# Patient Record
Sex: MALE | Race: WHITE | NOT HISPANIC OR LATINO | Employment: OTHER | URBAN - METROPOLITAN AREA
[De-identification: names, ages, dates, MRNs, and addresses within clinical notes are randomized per-mention and may not be internally consistent; named-entity substitution may affect disease eponyms.]

---

## 2019-04-16 PROBLEM — I42.8 NICM (NONISCHEMIC CARDIOMYOPATHY): Status: ACTIVE | Noted: 2019-04-16

## 2019-04-16 PROBLEM — I48.19 ATRIAL FIBRILLATION, PERSISTENT (HCC): Status: ACTIVE | Noted: 2019-04-16

## 2019-04-16 PROBLEM — I10 ESSENTIAL HYPERTENSION: Status: ACTIVE | Noted: 2019-04-16

## 2019-04-16 PROBLEM — E11.9 DIABETES MELLITUS TYPE II, CONTROLLED: Status: ACTIVE | Noted: 2019-04-16

## 2019-04-16 PROBLEM — I25.10 CORONARY ARTERY DISEASE INVOLVING NATIVE CORONARY ARTERY OF NATIVE HEART WITHOUT ANGINA PECTORIS: Status: ACTIVE | Noted: 2019-04-16

## 2019-04-16 NOTE — PROGRESS NOTES
Electrophysiology Clinic Consult     Tito Brennan  1960  [unfilled]  [unfilled]    04/17/19    DATE OF ADMISSION: (Not on file)  [facility] CARDIOLOGY    Gabe Mims MD  [address] / [place] [address]    Chief Complaint   Patient presents with   • Atrial Fibrillation       Problem List:  1. Persistent atrial fibrillation  a. CHADSVASc = 3, on warfarin  b. Diagnosed ~2000 at time of colonoscopy, asymptomatic  2. NICM  a. Echocardiogram 12/28/2018: EF 40%, LA size 5.2 cm in atrial fibrillation during exam  3. Coronary artery disease  a. Nuclear stress test 2/13/2019: Moderate size, moderate intensity anteroseptal wall reversible defect suggestive of ischemia, EF 37%  b. Left heart catheterization 3/6/2019: Nonobstructive, noncritical coronary artery disease to the distal RCA, 20-30%, EF 35-40%  4. Diabetes mellitus type II  5. Hypertension  6. Anxiety  7. Arthritis  8. Surgical history  a. Foot surgery  b. Hand surgery      History of Present Illness:  Patient is a 59-year-old male with the above-noted past medical history who presents today in consultation by referral from Dr. Ortiz for further evaluation and management of atrial fibrillation.  He was diagnosed with atrial fibrillation in approximately 2000 at the time of a colonoscopy.  He was completely asymptomatic at that time and believes he has been in atrial fibrillation since then.  He denies any hx of ECV or AAD use.  Echocardiogram in December demonstrated a reduced EF of approximately 40%.  Subsequently, he underwent stress test in February which was noted to be abnormal.  He then underwent LHC in March which only showed 20-30% distal RCA disease.  Given that he only had a mild, nonobstructive disease on his heart cath, it was felt that his cardiomyopathy was secondary to atrial fibrillation with RVR.  He has a CHADSVASc score of 3 and is anticoagulated with warfarin.  Symptoms with atrial fibrillation include rare  palpitations, described as mildly severe in nature and only occurs occasionally if he is resting, symptoms exacerbated by nothing, relieved by nothing.  He only feels his palpitations at most once weekly.  Does not routinely check HR at home.  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  HTN well controlled on meds.  No s/s suggestive of MO.  GERD well controlled on Protonix.  No known hx of thyroid issues.  He is a non-smoker, no ETOH, 2 decaf beverages daily.         No Known Allergies     Cannot display prior to admission medications because the patient has not been admitted in this contact.            Current Outpatient Medications:   •  diltiaZEM (CARDIZEM) 30 MG tablet, Take 30 mg by mouth 2 (Two) Times a Day., Disp: , Rfl:   •  enalapril (VASOTEC) 5 MG tablet, Take 5 mg by mouth 2 (Two) Times a Day., Disp: , Rfl:   •  metFORMIN (GLUCOPHAGE) 1000 MG tablet, Take 1,000 mg by mouth 2 (Two) Times a Day With Meals., Disp: , Rfl:   •  metoprolol succinate XL (TOPROL-XL) 100 MG 24 hr tablet, Take 100 mg by mouth Daily., Disp: , Rfl:   •  pantoprazole (PROTONIX) 40 MG EC tablet, Take 40 mg by mouth Daily., Disp: , Rfl:   •  warfarin (COUMADIN) 10 MG tablet, Take 10 mg by mouth Daily. As directed, Disp: , Rfl:     Social History     Socioeconomic History   • Marital status:      Spouse name: Not on file   • Number of children: Not on file   • Years of education: Not on file   • Highest education level: Not on file   Tobacco Use   • Smoking status: Never Smoker   Substance and Sexual Activity   • Alcohol use: No     Frequency: Never   • Drug use: No       Family History:  Father: , age 80, CVA  Mother: Alive, has pacemaker    REVIEW OF SYSTEMS:   CONST:  No weight loss, fever, chills, weakness + very mild fatigue.   HEENT:  No visual loss, blurred vision, double vision, yellow sclerae.                   No hearing loss, congestion, sore throat.   SKIN:      No rashes, urticaria, ulcers, sores.   "   RESP:     No shortness of breath, hemoptysis, +cough, + sputum.   GI:           No anorexia, nausea, vomiting, diarrhea. No abdominal pain, melena.   :         No burning on urination, hematuria or increased frequency.  ENDO:    No diaphoresis, cold or heat intolerance. No polyuria or polydipsia.   NEURO:  No headache, dizziness, syncope, paralysis, ataxia, or parasthesias.                  No change in bowel or bladder control. No history of CVA/TIA  MUSC:    + muscle pain (cramps), no back pain, joint pain or stiffness.   HEME:    No anemia, bleeding, bruising. No history of DVT/PE.  PSYCH:  No history of depression, anxiety    Vitals:    04/17/19 0934   BP: 120/88   BP Location: Left arm   Patient Position: Sitting   Pulse: 82   Weight: 120 kg (264 lb)   Height: 182.9 cm (72\")                 Physical Exam:  GEN: Well nourished, well-developed, no acute distress  HEENT: Normocephalic, atraumatic, PERRLA, moist mucous membranes  NECK: Supple, NO JVD, no thyromegaly, no lymphadenopathy  CARD: S1S2, irreg irreg, no murmur, gallop, rub, PMI displaced  LUNGS: Clear to auscultation, normal respiratory effort  ABDOMEN: Soft, nontender, normal bowel sounds  EXTREMITIES: No gross deformities, no clubbing, cyanosis, or edema  SKIN: Warm, dry, no lesions  NEURO: No focal deficits, alert and oriented x 3  PSYCHIATRIC: Normal affect and mood      I personally viewed and interpreted the patient's EKG/Telemetry/lab data    No results found for: GLUCOSE, CALCIUM, NA, K, CO2, CL, BUN, CREATININE, EGFRIFAFRI, EGFRIFNONA, BCR, ANIONGAP  No results found for: WBC, HGB, HCT, MCV, PLT  No results found for: INR, PROTIME  No results found for: TSH, B6JLRBG, Y9FKZOV, THYROIDAB    Labwork from OSH 4/9:  INR 1.66  Cr 0.98  BUN 14  K 4.8  WBC 10.38  H/H 14.6/44.0      ECG 12 Lead  Date/Time: 4/17/2019 9:41 AM  Performed by: Adam Vergara MD  Authorized by: Adam Vergara MD   Comparison: not compared with previous ECG "   Previous ECG: no previous ECG available  Rhythm: atrial fibrillation  BPM: 82                ICD-10-CM ICD-9-CM   1. Atrial fibrillation, persistent (CMS/HCC) I48.1 427.31   2. NICM (nonischemic cardiomyopathy) (CMS/HCC) I42.8 425.4   3. Essential hypertension I10 401.9       Assessment and Plan:   1. Persistent atrial fibrillation:  - Patient in persistent atrial fibrillation he believes since 2000.  No hx of ECV or AAD use.  Overall asymptomatic.  Now with reduced EF of 40% though no prior echo for comparison.  Likely tachycardia induced.  Given his duration of atrial fibrillation, unlikely that we would ever be able to restore normal rhythm.  Need to be aggressive in rate control.  Will place Zio patch to assess overall heart rates.  Would like to get off diltiazem soon  - CHADSVASc = 3, on warfarin  2. NICM: No family Hx of DCM. No CAD  - EF 40%. Normal coronaries per LHC.  Felt to be secondary to atrial fibrillation with RVR.  - On BBL, ACE.  Increase enalapril to 10 mg bid.  3. HTN:  - Well controlled      Scribed for Adam Vergara MD by Jade Linn, PAOLA. 4/17/2019  9:41 AM     I, Adam Vergara MD, personally performed the services described in this documentation as scribed by the above named individual in my presence, and it is both accurate and complete.  4/17/2019  10:10 AM

## 2019-04-17 ENCOUNTER — CONSULT (OUTPATIENT)
Dept: CARDIOLOGY | Facility: CLINIC | Age: 59
End: 2019-04-17

## 2019-04-17 VITALS
HEIGHT: 72 IN | WEIGHT: 264 LBS | DIASTOLIC BLOOD PRESSURE: 88 MMHG | SYSTOLIC BLOOD PRESSURE: 120 MMHG | HEART RATE: 82 BPM | BODY MASS INDEX: 35.76 KG/M2

## 2019-04-17 DIAGNOSIS — I42.8 NICM (NONISCHEMIC CARDIOMYOPATHY) (HCC): ICD-10-CM

## 2019-04-17 DIAGNOSIS — I10 ESSENTIAL HYPERTENSION: ICD-10-CM

## 2019-04-17 DIAGNOSIS — I48.19 ATRIAL FIBRILLATION, PERSISTENT (HCC): Primary | ICD-10-CM

## 2019-04-17 PROCEDURE — 99244 OFF/OP CNSLTJ NEW/EST MOD 40: CPT | Performed by: INTERNAL MEDICINE

## 2019-04-17 PROCEDURE — 93000 ELECTROCARDIOGRAM COMPLETE: CPT | Performed by: INTERNAL MEDICINE

## 2019-04-17 RX ORDER — METOPROLOL SUCCINATE 100 MG/1
100 TABLET, EXTENDED RELEASE ORAL DAILY
Status: ON HOLD | COMMUNITY
End: 2019-06-28 | Stop reason: SDUPTHER

## 2019-04-17 RX ORDER — ENALAPRIL MALEATE 5 MG/1
10 TABLET ORAL 2 TIMES DAILY
Qty: 180 TABLET | Refills: 3 | Status: SHIPPED | OUTPATIENT
Start: 2019-04-17 | End: 2019-06-28 | Stop reason: HOSPADM

## 2019-04-17 RX ORDER — ENALAPRIL MALEATE 5 MG/1
10 TABLET ORAL 2 TIMES DAILY
COMMUNITY
End: 2019-04-17 | Stop reason: SDUPTHER

## 2019-04-17 RX ORDER — PANTOPRAZOLE SODIUM 40 MG/1
40 TABLET, DELAYED RELEASE ORAL DAILY
COMMUNITY
End: 2020-06-03 | Stop reason: SDUPTHER

## 2019-04-17 RX ORDER — WARFARIN SODIUM 10 MG/1
10 TABLET ORAL EVERY OTHER DAY
Status: ON HOLD | COMMUNITY
End: 2019-06-28 | Stop reason: SDUPTHER

## 2019-05-20 ENCOUNTER — DOCUMENTATION (OUTPATIENT)
Dept: CARDIOLOGY | Facility: CLINIC | Age: 59
End: 2019-05-20

## 2019-05-20 DIAGNOSIS — I48.19 ATRIAL FIBRILLATION, PERSISTENT (HCC): Primary | ICD-10-CM

## 2019-05-20 NOTE — PROGRESS NOTES
Spoke with the patient over the phone regarding results of his event monitor demonstrating 100% AF with average HR 82 bpm ( bpm).  Given his reduced EF of 40%, would favor making an attempt at restoring normal rhythm to see if this improves his EF.  Discussed bringing in for Tikosyn initiation + ECV.  He is agreeable to proceed.  Orders placed.    PAOLA [facility]  5/20/2019  1:02 PM

## 2019-05-31 ENCOUNTER — PREP FOR SURGERY (OUTPATIENT)
Dept: OTHER | Facility: HOSPITAL | Age: 59
End: 2019-05-31

## 2019-05-31 DIAGNOSIS — I48.19 ATRIAL FIBRILLATION, PERSISTENT (HCC): Primary | ICD-10-CM

## 2019-06-21 ENCOUNTER — TELEPHONE (OUTPATIENT)
Dept: CARDIOLOGY | Facility: CLINIC | Age: 59
End: 2019-06-21

## 2019-06-21 DIAGNOSIS — I48.19 ATRIAL FIBRILLATION, PERSISTENT (HCC): Primary | ICD-10-CM

## 2019-06-21 NOTE — TELEPHONE ENCOUNTER
Received call from Petros that the patient is sub-theraputic on Coumadin and needs a MING before their tikosyn admission Monday per Kayla.

## 2019-06-23 ENCOUNTER — APPOINTMENT (OUTPATIENT)
Dept: PREADMISSION TESTING | Facility: HOSPITAL | Age: 59
End: 2019-06-23

## 2019-06-24 ENCOUNTER — APPOINTMENT (OUTPATIENT)
Dept: PREADMISSION TESTING | Facility: HOSPITAL | Age: 59
End: 2019-06-24

## 2019-06-24 LAB
ANION GAP SERPL CALCULATED.3IONS-SCNC: 14 MMOL/L
APTT PPP: 36.8 SECONDS (ref 24–37)
BUN BLD-MCNC: 15 MG/DL (ref 8–23)
BUN/CREAT SERPL: 14 (ref 7–25)
CA-I SERPL ISE-MCNC: 1.34 MMOL/L (ref 1.12–1.32)
CALCIUM SPEC-SCNC: 9.5 MG/DL (ref 8.6–10.5)
CHLORIDE SERPL-SCNC: 105 MMOL/L (ref 98–107)
CO2 SERPL-SCNC: 25 MMOL/L (ref 22–29)
CREAT BLD-MCNC: 1.07 MG/DL (ref 0.76–1.27)
GFR SERPL CREATININE-BSD FRML MDRD: 70 ML/MIN/1.73
GLUCOSE BLD-MCNC: 133 MG/DL (ref 65–99)
MAGNESIUM SERPL-MCNC: 1.8 MG/DL (ref 1.6–2.4)
POTASSIUM BLD-SCNC: 4.9 MMOL/L (ref 3.5–5.2)
SODIUM BLD-SCNC: 144 MMOL/L (ref 136–145)

## 2019-06-24 PROCEDURE — 80048 BASIC METABOLIC PNL TOTAL CA: CPT | Performed by: NURSE PRACTITIONER

## 2019-06-24 PROCEDURE — 85730 THROMBOPLASTIN TIME PARTIAL: CPT | Performed by: NURSE PRACTITIONER

## 2019-06-24 PROCEDURE — 82330 ASSAY OF CALCIUM: CPT | Performed by: NURSE PRACTITIONER

## 2019-06-24 PROCEDURE — 83735 ASSAY OF MAGNESIUM: CPT | Performed by: NURSE PRACTITIONER

## 2019-06-24 RX ORDER — WARFARIN SODIUM 7.5 MG/1
7.5 TABLET ORAL EVERY OTHER DAY
COMMUNITY
End: 2019-06-28 | Stop reason: HOSPADM

## 2019-06-24 RX ORDER — CETIRIZINE HYDROCHLORIDE 10 MG/1
10 TABLET ORAL DAILY
COMMUNITY

## 2019-06-24 NOTE — DISCHARGE INSTRUCTIONS
The following instructions were given to the patient during PAT visit:    Please report to registration at the arrival time told to you by your physician.    You may eat and drink as normal the day of your procedure.  Please take your morning dose of medications as usual.    If you did not bring your medications to your PAT visit, pleas bring with you to the hospital on the day of admission.    If applicable, please CPAP mask and tubing for your hospital stay.

## 2019-06-25 ENCOUNTER — ANESTHESIA (OUTPATIENT)
Dept: CARDIOLOGY | Facility: HOSPITAL | Age: 59
End: 2019-06-25

## 2019-06-25 ENCOUNTER — HOSPITAL ENCOUNTER (OUTPATIENT)
Dept: CARDIOLOGY | Facility: HOSPITAL | Age: 59
Discharge: HOME | End: 2019-06-25

## 2019-06-25 ENCOUNTER — ANESTHESIA EVENT (OUTPATIENT)
Dept: CARDIOLOGY | Facility: HOSPITAL | Age: 59
End: 2019-06-25

## 2019-06-25 ENCOUNTER — HOSPITAL ENCOUNTER (INPATIENT)
Facility: HOSPITAL | Age: 59
LOS: 3 days | Discharge: HOME | End: 2019-06-28
Attending: INTERNAL MEDICINE | Admitting: INTERNAL MEDICINE

## 2019-06-25 VITALS
DIASTOLIC BLOOD PRESSURE: 99 MMHG | HEART RATE: 71 BPM | SYSTOLIC BLOOD PRESSURE: 135 MMHG | OXYGEN SATURATION: 96 % | RESPIRATION RATE: 16 BRPM

## 2019-06-25 DIAGNOSIS — I42.8 NICM (NONISCHEMIC CARDIOMYOPATHY) (HCC): Primary | ICD-10-CM

## 2019-06-25 DIAGNOSIS — I48.19 ATRIAL FIBRILLATION, PERSISTENT (HCC): ICD-10-CM

## 2019-06-25 LAB
ANION GAP SERPL CALCULATED.3IONS-SCNC: 16 MMOL/L
BH CV ECHO MEAS - IVSD: 1.1 CM
BH CV ECHO MEAS - LVIDD: 5.6 CM
BH CV ECHO MEAS - LVIDS: 4.6 CM
BH CV ECHO MEAS - LVPWD: 1.1 CM
BH CV ECHO MEAS - RVSP: 28 MMHG
BH CV VAS BP LEFT ARM: NORMAL MMHG
BUN BLD-MCNC: 13 MG/DL (ref 8–23)
BUN/CREAT SERPL: 15.3 (ref 7–25)
CALCIUM SPEC-SCNC: 9.2 MG/DL (ref 8.6–10.5)
CHLORIDE SERPL-SCNC: 104 MMOL/L (ref 98–107)
CO2 SERPL-SCNC: 21 MMOL/L (ref 22–29)
CREAT BLD-MCNC: 0.85 MG/DL (ref 0.76–1.27)
GFR SERPL CREATININE-BSD FRML MDRD: 92 ML/MIN/1.73
GLUCOSE BLD-MCNC: 159 MG/DL (ref 65–99)
GLUCOSE BLDC GLUCOMTR-MCNC: 142 MG/DL (ref 70–130)
GLUCOSE BLDC GLUCOMTR-MCNC: 205 MG/DL (ref 70–130)
INR PPP: 1.98 (ref 0.85–1.16)
LV EF 2D ECHO EST: 40 %
MAGNESIUM SERPL-MCNC: 1.7 MG/DL (ref 1.6–2.4)
POTASSIUM BLD-SCNC: 4.2 MMOL/L (ref 3.5–5.2)
PROTHROMBIN TIME: 21.7 SECONDS (ref 11.2–14.3)
SINUS: 3.6 CM
SODIUM BLD-SCNC: 141 MMOL/L (ref 136–145)
STJ: 3.2 CM

## 2019-06-25 PROCEDURE — 93325 DOPPLER ECHO COLOR FLOW MAPG: CPT | Performed by: INTERNAL MEDICINE

## 2019-06-25 PROCEDURE — 83735 ASSAY OF MAGNESIUM: CPT

## 2019-06-25 PROCEDURE — 93010 ELECTROCARDIOGRAM REPORT: CPT | Performed by: INTERNAL MEDICINE

## 2019-06-25 PROCEDURE — 25010000002 FENTANYL CITRATE (PF) 100 MCG/2ML SOLUTION: Performed by: INTERNAL MEDICINE

## 2019-06-25 PROCEDURE — 82962 GLUCOSE BLOOD TEST: CPT

## 2019-06-25 PROCEDURE — 99153 MOD SED SAME PHYS/QHP EA: CPT

## 2019-06-25 PROCEDURE — 85610 PROTHROMBIN TIME: CPT | Performed by: NURSE PRACTITIONER

## 2019-06-25 PROCEDURE — 25010000002 MIDAZOLAM PER 1 MG: Performed by: INTERNAL MEDICINE

## 2019-06-25 PROCEDURE — 93321 DOPPLER ECHO F-UP/LMTD STD: CPT

## 2019-06-25 PROCEDURE — 80048 BASIC METABOLIC PNL TOTAL CA: CPT

## 2019-06-25 PROCEDURE — 93312 ECHO TRANSESOPHAGEAL: CPT | Performed by: INTERNAL MEDICINE

## 2019-06-25 PROCEDURE — B246ZZ4 ULTRASONOGRAPHY OF RIGHT AND LEFT HEART, TRANSESOPHAGEAL: ICD-10-PCS | Performed by: INTERNAL MEDICINE

## 2019-06-25 PROCEDURE — 99152 MOD SED SAME PHYS/QHP 5/>YRS: CPT

## 2019-06-25 PROCEDURE — 93325 DOPPLER ECHO COLOR FLOW MAPG: CPT

## 2019-06-25 PROCEDURE — 93321 DOPPLER ECHO F-UP/LMTD STD: CPT | Performed by: INTERNAL MEDICINE

## 2019-06-25 PROCEDURE — 93312 ECHO TRANSESOPHAGEAL: CPT

## 2019-06-25 PROCEDURE — 93005 ELECTROCARDIOGRAM TRACING: CPT

## 2019-06-25 PROCEDURE — 93005 ELECTROCARDIOGRAM TRACING: CPT | Performed by: NURSE PRACTITIONER

## 2019-06-25 RX ORDER — MAGNESIUM SULFATE HEPTAHYDRATE 40 MG/ML
2 INJECTION, SOLUTION INTRAVENOUS AS NEEDED
Status: DISCONTINUED | OUTPATIENT
Start: 2019-06-25 | End: 2019-06-28 | Stop reason: HOSPADM

## 2019-06-25 RX ORDER — MAGNESIUM SULFATE HEPTAHYDRATE 40 MG/ML
4 INJECTION, SOLUTION INTRAVENOUS AS NEEDED
Status: DISCONTINUED | OUTPATIENT
Start: 2019-06-25 | End: 2019-06-28 | Stop reason: HOSPADM

## 2019-06-25 RX ORDER — MIDAZOLAM HYDROCHLORIDE 1 MG/ML
INJECTION INTRAMUSCULAR; INTRAVENOUS
Status: COMPLETED | OUTPATIENT
Start: 2019-06-25 | End: 2019-06-25

## 2019-06-25 RX ORDER — FENTANYL CITRATE 50 UG/ML
INJECTION, SOLUTION INTRAMUSCULAR; INTRAVENOUS
Status: COMPLETED | OUTPATIENT
Start: 2019-06-25 | End: 2019-06-25

## 2019-06-25 RX ORDER — DOFETILIDE 0.5 MG/1
500 CAPSULE ORAL ONCE
Status: DISCONTINUED | OUTPATIENT
Start: 2019-06-25 | End: 2019-06-27

## 2019-06-25 RX ORDER — ENALAPRIL MALEATE 10 MG/1
10 TABLET ORAL 2 TIMES DAILY
Status: DISCONTINUED | OUTPATIENT
Start: 2019-06-25 | End: 2019-06-28

## 2019-06-25 RX ORDER — DOFETILIDE 0.5 MG/1
500 CAPSULE ORAL ONCE
Status: COMPLETED | OUTPATIENT
Start: 2019-06-26 | End: 2019-06-26

## 2019-06-25 RX ORDER — WARFARIN SODIUM 5 MG/1
5 TABLET ORAL
Status: DISCONTINUED | OUTPATIENT
Start: 2019-06-25 | End: 2019-06-25

## 2019-06-25 RX ORDER — PANTOPRAZOLE SODIUM 40 MG/1
40 TABLET, DELAYED RELEASE ORAL DAILY
Status: DISCONTINUED | OUTPATIENT
Start: 2019-06-25 | End: 2019-06-28 | Stop reason: HOSPADM

## 2019-06-25 RX ORDER — CETIRIZINE HYDROCHLORIDE 10 MG/1
10 TABLET ORAL DAILY
Status: DISCONTINUED | OUTPATIENT
Start: 2019-06-25 | End: 2019-06-28 | Stop reason: HOSPADM

## 2019-06-25 RX ORDER — POTASSIUM CHLORIDE 1.5 G/1.77G
40 POWDER, FOR SOLUTION ORAL AS NEEDED
Status: DISCONTINUED | OUTPATIENT
Start: 2019-06-25 | End: 2019-06-28 | Stop reason: HOSPADM

## 2019-06-25 RX ORDER — MAGNESIUM SULFATE HEPTAHYDRATE 40 MG/ML
4 INJECTION, SOLUTION INTRAVENOUS AS NEEDED
Status: CANCELLED | OUTPATIENT
Start: 2019-06-25

## 2019-06-25 RX ORDER — WARFARIN SODIUM 7.5 MG/1
7.5 TABLET ORAL
Status: DISCONTINUED | OUTPATIENT
Start: 2019-06-26 | End: 2019-06-25

## 2019-06-25 RX ORDER — MAGNESIUM SULFATE HEPTAHYDRATE 40 MG/ML
2 INJECTION, SOLUTION INTRAVENOUS AS NEEDED
Status: CANCELLED | OUTPATIENT
Start: 2019-06-25

## 2019-06-25 RX ORDER — POTASSIUM CHLORIDE 750 MG/1
40 CAPSULE, EXTENDED RELEASE ORAL AS NEEDED
Status: DISCONTINUED | OUTPATIENT
Start: 2019-06-25 | End: 2019-06-28 | Stop reason: HOSPADM

## 2019-06-25 RX ORDER — DOFETILIDE 0.5 MG/1
500 CAPSULE ORAL EVERY 12 HOURS SCHEDULED
Status: DISCONTINUED | OUTPATIENT
Start: 2019-06-26 | End: 2019-06-26

## 2019-06-25 RX ORDER — WARFARIN SODIUM 7.5 MG/1
7.5 TABLET ORAL
Status: DISCONTINUED | OUTPATIENT
Start: 2019-06-25 | End: 2019-06-25 | Stop reason: DRUGHIGH

## 2019-06-25 RX ORDER — METOPROLOL SUCCINATE 100 MG/1
100 TABLET, EXTENDED RELEASE ORAL DAILY
Status: DISCONTINUED | OUTPATIENT
Start: 2019-06-25 | End: 2019-06-28

## 2019-06-25 RX ADMIN — FENTANYL CITRATE 25 MCG: 50 INJECTION, SOLUTION INTRAMUSCULAR; INTRAVENOUS at 08:14

## 2019-06-25 RX ADMIN — DILTIAZEM HYDROCHLORIDE 30 MG: 30 TABLET, FILM COATED ORAL at 21:20

## 2019-06-25 RX ADMIN — FENTANYL CITRATE 50 MCG: 50 INJECTION, SOLUTION INTRAMUSCULAR; INTRAVENOUS at 08:13

## 2019-06-25 RX ADMIN — MIDAZOLAM HYDROCHLORIDE 1 MG: 1 INJECTION, SOLUTION INTRAMUSCULAR; INTRAVENOUS at 08:16

## 2019-06-25 RX ADMIN — MIDAZOLAM HYDROCHLORIDE 1 MG: 1 INJECTION, SOLUTION INTRAMUSCULAR; INTRAVENOUS at 08:15

## 2019-06-25 RX ADMIN — MIDAZOLAM HYDROCHLORIDE 2 MG: 1 INJECTION, SOLUTION INTRAMUSCULAR; INTRAVENOUS at 08:13

## 2019-06-25 RX ADMIN — MIDAZOLAM HYDROCHLORIDE 1 MG: 1 INJECTION, SOLUTION INTRAMUSCULAR; INTRAVENOUS at 08:18

## 2019-06-25 RX ADMIN — DOFETILIDE 500 MCG: 0.5 CAPSULE ORAL at 15:00

## 2019-06-25 RX ADMIN — FENTANYL CITRATE 50 MCG: 50 INJECTION, SOLUTION INTRAMUSCULAR; INTRAVENOUS at 08:24

## 2019-06-25 RX ADMIN — MAGNESIUM SULFATE HEPTAHYDRATE 4 G: 40 INJECTION, SOLUTION INTRAVENOUS at 14:28

## 2019-06-25 RX ADMIN — MIDAZOLAM HYDROCHLORIDE 1 MG: 1 INJECTION, SOLUTION INTRAMUSCULAR; INTRAVENOUS at 08:20

## 2019-06-25 RX ADMIN — METFORMIN HYDROCHLORIDE 1000 MG: 1000 TABLET ORAL at 17:56

## 2019-06-25 RX ADMIN — ENALAPRIL MALEATE 10 MG: 10 TABLET ORAL at 21:20

## 2019-06-25 NOTE — H&P
[facility]   H&P     Tito Brennan  1960    There is no work phone number on file.      06/25/19    DATE OF ADMISSION: 6/25/2019  [facility] CARDIOLOGY    Gabe Mims MD  [address] / [place] [address]    Chief Complaint: Persistent atrial fibrillation    Problem List:  1. Persistent atrial fibrillation  a. CHADSVASc = 3, on warfarin  b. Diagnosed ~2000 at time of colonoscopy, asymptomatic  2. NICM  a. Echocardiogram 12/28/2018: EF 40%, LA size 5.2 cm in atrial fibrillation during exam  3. Coronary artery disease  a. Nuclear stress test 2/13/2019: Moderate size, moderate intensity anteroseptal wall reversible defect suggestive of ischemia, EF 37%  b. Left heart catheterization 3/6/2019: Nonobstructive, noncritical coronary artery disease to the distal RCA, 20-30%, EF 35-40%  4. Diabetes mellitus type II  5. Hypertension  6. Anxiety  7. Arthritis  8. Surgical history  a. Foot surgery  b. Hand surgery      History of Present Illness:   Patient is a 59 year old  male with the above noted past medical history who presents today to undergo MING followed by Tikosyn initiation.  He was diagnosed with atrial fibrillation in approximately 2000 at the time of a colonoscopy.  He was completely asymptomatic at that time and believes he has been in atrial fibrillation since then.  He denies any hx of ECV or AAD use.  Echocardiogram in December demonstrated a reduced EF of approximately 40%.  Subsequently, he underwent stress test in February which was noted to be abnormal.  He then underwent LHC in March which only showed 20-30% distal RCA disease.  Given that he only had a mild, nonobstructive disease on his heart cath, it was felt that his cardiomyopathy was secondary to atrial fibrillation with RVR.  He has a CHADSVASc score of 3 and is anticoagulated with warfarin with recent subtherapeutic levels.  Symptoms with atrial fibrillation include rare  palpitations, described as mildly severe in nature and only occurs occasionally if he is resting, symptoms exacerbated by nothing, relieved by nothing.  He only feels his palpitations at most once weekly.  Does not routinely check HR at home.  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  HTN well controlled on meds.  No s/s suggestive of MO.  GERD well controlled on Protonix.  No known hx of thyroid issues.  He is a non-smoker, no ETOH, 2 decaf beverages daily.        No Known Allergies     Cannot display prior to admission medications because the patient has not been admitted in this contact.            Current Outpatient Medications:   •  cetirizine (zyrTEC) 10 MG tablet, Take 10 mg by mouth Daily., Disp: , Rfl:   •  diltiaZEM (CARDIZEM) 30 MG tablet, Take 30 mg by mouth 2 (Two) Times a Day., Disp: , Rfl:   •  enalapril (VASOTEC) 5 MG tablet, Take 2 tablets by mouth 2 (Two) Times a Day., Disp: 180 tablet, Rfl: 3  •  metFORMIN (GLUCOPHAGE) 1000 MG tablet, Take 1,000 mg by mouth 2 (Two) Times a Day With Meals., Disp: , Rfl:   •  metoprolol succinate XL (TOPROL-XL) 100 MG 24 hr tablet, Take 100 mg by mouth Daily., Disp: , Rfl:   •  pantoprazole (PROTONIX) 40 MG EC tablet, Take 40 mg by mouth Daily., Disp: , Rfl:   •  warfarin (COUMADIN) 10 MG tablet, Take 10 mg by mouth Every Other Day. As directed , Disp: , Rfl:   •  warfarin (COUMADIN) 7.5 MG tablet, Take 7.5 mg by mouth Every Other Day., Disp: , Rfl:     Social History     Socioeconomic History   • Marital status:      Spouse name: Not on file   • Number of children: Not on file   • Years of education: Not on file   • Highest education level: Not on file   Tobacco Use   • Smoking status: Never Smoker   • Smokeless tobacco: Never Used   Substance and Sexual Activity   • Alcohol use: No     Frequency: Never   • Drug use: No     Family History:  Father: , age 80, CVA  Mother: Alive, has pacemaker    REVIEW OF SYSTEMS:   CONST:  No weight  loss, fever, chills, weakness + mild fatigue.   HEENT:  No visual loss, blurred vision, double vision, yellow sclerae.                   No hearing loss, congestion, sore throat.   SKIN:      No rashes, urticaria, ulcers, sores.     RESP:     No shortness of breath, hemoptysis, cough, sputum.   GI:           No anorexia, nausea, vomiting, diarrhea. No abdominal pain, melena.   :         No burning on urination, hematuria or increased frequency.  ENDO:    No diaphoresis, cold or heat intolerance. No polyuria or polydipsia.   NEURO:  No headache, dizziness, syncope, paralysis, ataxia, or parasthesias.                  No change in bowel or bladder control. No history of CVA/TIA  MUSC:    No muscle, back pain, joint pain or stiffness.   HEME:    No anemia, bleeding, bruising. No history of DVT/PE.  PSYCH:  No history of depression, anxiety    OBJECTIVE:    Vitals:    06/25/19 0757   BP: (!) 140/104   Pulse: 76   SpO2: 97%         Vital Sign Min/Max for last 24 hours  No Data Recorded   BP  Min: 140/104  Max: 140/104   Pulse  Min: 76  Max: 76   No Data Recorded   SpO2  Min: 97 %  Max: 97 %   No Data Recorded    No intake or output data in the 24 hours ending 06/25/19 0801          Physical Exam:  GEN: Well nourished, well-developed, no acute distress  HEENT: Normocephalic, atraumatic, PERRLA, moist mucous membranes  NECK: Supple, No JVD, no thyromegaly, no lymphadenopathy  CARD: S1S2, irreg irreg, no murmur, gallop, or rub  LUNGS: Clear to auscultation, normal respiratory effort  ABDOMEN: Soft, nontender, normal bowel sounds  EXTREMITIES: No gross deformities, no clubbing, cyanosis, or edema  SKIN: Warm, dry  NEURO: No focal deficits, alert and oriented x 3  PSYCHIATRIC: Normal affect and mood      Data:       Results from last 7 days   Lab Units 06/24/19  1538   SODIUM mmol/L 144   POTASSIUM mmol/L 4.9   CHLORIDE mmol/L 105   CO2 mmol/L 25.0   BUN mg/dL 15   CREATININE mg/dL 1.07   GLUCOSE mg/dL 133*      Magnesium:  1.8  Ionized Calcium: 1.34              Results from last 7 days   Lab Units 06/24/19  1538   APTT seconds 36.8                 No intake or output data in the 24 hours ending 06/25/19 0801    Chest X-Ray:  Imaging Results (last 24 hours)     ** No results found for the last 24 hours. **          Telemetry: Atrial fibrillation, HR 76 bpm  EKG:     Assessment and Plan:   1. Persistent atrial fibrillation:  - Patient in persistent atrial fibrillation he believes since 2000.  No hx of ECV or AAD use.  Overall asymptomatic.  Now with reduced EF of 40% though no prior echo for comparison.  Likely tachycardia induced.   - Admitted today to initiate Tikosyn.  Given subtherapeutic INR's, will proceed with MING prior to Tikosyn initiation.  The risks, benefits, and alternatives of the procedure have been reviewed and the patient wishes to proceed.   - CrCl 98.2 mL/min, QTc 426 ms.  If MING showing no KASSIE thrombus, will initiate Tikosyn 500 mcg bid with close montioring of QTc and electrolytes.  Will plan for ECV prior to discharge if he remains in atrial fibrillation.  - CHADSVASc = 3, recheck INR.  Continue warfarin with pharmacy to dose    2. NICM: No family Hx of DCM. No CAD  - EF 40%. Normal coronaries per LHC.  Felt to be secondary to atrial fibrillation with RVR.  - On BBL, ACE.    - Recheck in the next few months once we restore normal rhythm    3. HTN:  - Monitor and adjust meds as needed    Addendum 1335: MING showing no KASSIE thrombus.  Will proceed with Tikosyn initiation.      Electronically signed by PAOLA Bo, 06/25/19, 8:01 AM.

## 2019-06-26 LAB
ANION GAP SERPL CALCULATED.3IONS-SCNC: 11 MMOL/L
BUN BLD-MCNC: 14 MG/DL (ref 8–23)
BUN/CREAT SERPL: 16.3 (ref 7–25)
CALCIUM SPEC-SCNC: 8.9 MG/DL (ref 8.6–10.5)
CHLORIDE SERPL-SCNC: 105 MMOL/L (ref 98–107)
CO2 SERPL-SCNC: 22 MMOL/L (ref 22–29)
CREAT BLD-MCNC: 0.86 MG/DL (ref 0.76–1.27)
GFR SERPL CREATININE-BSD FRML MDRD: 91 ML/MIN/1.73
GLUCOSE BLD-MCNC: 138 MG/DL (ref 65–99)
GLUCOSE BLDC GLUCOMTR-MCNC: 132 MG/DL (ref 70–130)
GLUCOSE BLDC GLUCOMTR-MCNC: 138 MG/DL (ref 70–130)
GLUCOSE BLDC GLUCOMTR-MCNC: 138 MG/DL (ref 70–130)
GLUCOSE BLDC GLUCOMTR-MCNC: 151 MG/DL (ref 70–130)
INR PPP: 2.17 (ref 0.85–1.16)
MAGNESIUM SERPL-MCNC: 1.8 MG/DL (ref 1.6–2.4)
POTASSIUM BLD-SCNC: 4.3 MMOL/L (ref 3.5–5.2)
PROTHROMBIN TIME: 23.2 SECONDS (ref 11.2–14.3)
SODIUM BLD-SCNC: 138 MMOL/L (ref 136–145)

## 2019-06-26 PROCEDURE — 93005 ELECTROCARDIOGRAM TRACING: CPT | Performed by: NURSE PRACTITIONER

## 2019-06-26 PROCEDURE — 83735 ASSAY OF MAGNESIUM: CPT | Performed by: NURSE PRACTITIONER

## 2019-06-26 PROCEDURE — 93010 ELECTROCARDIOGRAM REPORT: CPT | Performed by: INTERNAL MEDICINE

## 2019-06-26 PROCEDURE — 82962 GLUCOSE BLOOD TEST: CPT

## 2019-06-26 PROCEDURE — 99232 SBSQ HOSP IP/OBS MODERATE 35: CPT | Performed by: INTERNAL MEDICINE

## 2019-06-26 PROCEDURE — 80048 BASIC METABOLIC PNL TOTAL CA: CPT

## 2019-06-26 PROCEDURE — 93005 ELECTROCARDIOGRAM TRACING: CPT

## 2019-06-26 PROCEDURE — 85610 PROTHROMBIN TIME: CPT | Performed by: NURSE PRACTITIONER

## 2019-06-26 RX ORDER — WARFARIN SODIUM 7.5 MG/1
7.5 TABLET ORAL DAILY
Status: DISCONTINUED | OUTPATIENT
Start: 2019-06-27 | End: 2019-06-28

## 2019-06-26 RX ORDER — DOFETILIDE 0.25 MG/1
250 CAPSULE ORAL EVERY 12 HOURS SCHEDULED
Status: DISCONTINUED | OUTPATIENT
Start: 2019-06-26 | End: 2019-06-27

## 2019-06-26 RX ORDER — WARFARIN SODIUM 5 MG/1
10 TABLET ORAL DAILY
Status: DISCONTINUED | OUTPATIENT
Start: 2019-06-26 | End: 2019-06-26

## 2019-06-26 RX ADMIN — WARFARIN SODIUM 10 MG: 5 TABLET ORAL at 09:11

## 2019-06-26 RX ADMIN — DOFETILIDE 250 MCG: 0.25 CAPSULE ORAL at 20:47

## 2019-06-26 RX ADMIN — PANTOPRAZOLE SODIUM 40 MG: 40 TABLET, DELAYED RELEASE ORAL at 08:51

## 2019-06-26 RX ADMIN — METFORMIN HYDROCHLORIDE 1000 MG: 1000 TABLET ORAL at 08:51

## 2019-06-26 RX ADMIN — DILTIAZEM HYDROCHLORIDE 30 MG: 30 TABLET, FILM COATED ORAL at 20:47

## 2019-06-26 RX ADMIN — MAGNESIUM SULFATE HEPTAHYDRATE 4 G: 40 INJECTION, SOLUTION INTRAVENOUS at 05:45

## 2019-06-26 RX ADMIN — DOFETILIDE 500 MCG: 0.5 CAPSULE ORAL at 09:11

## 2019-06-26 RX ADMIN — CETIRIZINE HYDROCHLORIDE 10 MG: 10 TABLET, FILM COATED ORAL at 08:50

## 2019-06-26 RX ADMIN — DILTIAZEM HYDROCHLORIDE 30 MG: 30 TABLET, FILM COATED ORAL at 08:51

## 2019-06-26 RX ADMIN — METFORMIN HYDROCHLORIDE 1000 MG: 1000 TABLET ORAL at 17:36

## 2019-06-26 RX ADMIN — METOPROLOL SUCCINATE 100 MG: 100 TABLET, EXTENDED RELEASE ORAL at 08:51

## 2019-06-26 RX ADMIN — ENALAPRIL MALEATE 10 MG: 10 TABLET ORAL at 08:50

## 2019-06-26 RX ADMIN — ENALAPRIL MALEATE 10 MG: 10 TABLET ORAL at 20:47

## 2019-06-26 RX ADMIN — DOFETILIDE 500 MCG: 0.5 CAPSULE ORAL at 00:16

## 2019-06-27 ENCOUNTER — APPOINTMENT (OUTPATIENT)
Dept: CARDIOLOGY | Facility: HOSPITAL | Age: 59
End: 2019-06-27

## 2019-06-27 LAB
ANION GAP SERPL CALCULATED.3IONS-SCNC: 15 MMOL/L (ref 5–15)
BUN BLD-MCNC: 14 MG/DL (ref 8–23)
BUN/CREAT SERPL: 16.9 (ref 7–25)
CALCIUM SPEC-SCNC: 9.4 MG/DL (ref 8.6–10.5)
CHLORIDE SERPL-SCNC: 102 MMOL/L (ref 98–107)
CO2 SERPL-SCNC: 22 MMOL/L (ref 22–29)
CREAT BLD-MCNC: 0.83 MG/DL (ref 0.76–1.27)
GFR SERPL CREATININE-BSD FRML MDRD: 95 ML/MIN/1.73
GLUCOSE BLD-MCNC: 147 MG/DL (ref 65–99)
GLUCOSE BLDC GLUCOMTR-MCNC: 115 MG/DL (ref 70–130)
GLUCOSE BLDC GLUCOMTR-MCNC: 135 MG/DL (ref 70–130)
GLUCOSE BLDC GLUCOMTR-MCNC: 147 MG/DL (ref 70–130)
GLUCOSE BLDC GLUCOMTR-MCNC: 231 MG/DL (ref 70–130)
INR PPP: 2 (ref 0.85–1.16)
MAGNESIUM SERPL-MCNC: 1.7 MG/DL (ref 1.6–2.4)
POTASSIUM BLD-SCNC: 4.4 MMOL/L (ref 3.5–5.2)
PROTHROMBIN TIME: 21.8 SECONDS (ref 11.2–14.3)
SODIUM BLD-SCNC: 139 MMOL/L (ref 136–145)

## 2019-06-27 PROCEDURE — 92960 CARDIOVERSION ELECTRIC EXT: CPT

## 2019-06-27 PROCEDURE — 83735 ASSAY OF MAGNESIUM: CPT | Performed by: NURSE PRACTITIONER

## 2019-06-27 PROCEDURE — 25010000003 LIDOCAINE 1 % SOLUTION: Performed by: INTERNAL MEDICINE

## 2019-06-27 PROCEDURE — 82962 GLUCOSE BLOOD TEST: CPT

## 2019-06-27 PROCEDURE — 92960 CARDIOVERSION ELECTRIC EXT: CPT | Performed by: INTERNAL MEDICINE

## 2019-06-27 PROCEDURE — 92961 CARDIOVERSION ELECTRIC INT: CPT | Performed by: INTERNAL MEDICINE

## 2019-06-27 PROCEDURE — 92961 CARDIOVERSION ELECTRIC INT: CPT

## 2019-06-27 PROCEDURE — 5A2204Z RESTORATION OF CARDIAC RHYTHM, SINGLE: ICD-10-PCS | Performed by: INTERNAL MEDICINE

## 2019-06-27 PROCEDURE — 93005 ELECTROCARDIOGRAM TRACING: CPT

## 2019-06-27 PROCEDURE — 85610 PROTHROMBIN TIME: CPT | Performed by: NURSE PRACTITIONER

## 2019-06-27 PROCEDURE — 25010000002 MIDAZOLAM PER 1 MG: Performed by: INTERNAL MEDICINE

## 2019-06-27 PROCEDURE — C1894 INTRO/SHEATH, NON-LASER: HCPCS | Performed by: INTERNAL MEDICINE

## 2019-06-27 PROCEDURE — 80048 BASIC METABOLIC PNL TOTAL CA: CPT

## 2019-06-27 PROCEDURE — 99152 MOD SED SAME PHYS/QHP 5/>YRS: CPT | Performed by: INTERNAL MEDICINE

## 2019-06-27 RX ORDER — IBUTILIDE FUMARATE 0.1 MG/ML
1 INJECTION, SOLUTION INTRAVENOUS ONCE
Status: DISCONTINUED | OUTPATIENT
Start: 2019-06-27 | End: 2019-06-27

## 2019-06-27 RX ORDER — NALOXONE HYDROCHLORIDE 0.4 MG/ML
INJECTION, SOLUTION INTRAMUSCULAR; INTRAVENOUS; SUBCUTANEOUS
Status: DISCONTINUED
Start: 2019-06-27 | End: 2019-06-27 | Stop reason: WASHOUT

## 2019-06-27 RX ORDER — DOFETILIDE 0.25 MG/1
250 CAPSULE ORAL EVERY 12 HOURS SCHEDULED
Qty: 60 CAPSULE | Refills: 6 | Status: CANCELLED | OUTPATIENT
Start: 2019-06-27

## 2019-06-27 RX ORDER — MIDAZOLAM HYDROCHLORIDE 1 MG/ML
INJECTION INTRAMUSCULAR; INTRAVENOUS
Status: DISCONTINUED
Start: 2019-06-27 | End: 2019-06-28 | Stop reason: HOSPADM

## 2019-06-27 RX ORDER — MIDAZOLAM HYDROCHLORIDE 1 MG/ML
INJECTION INTRAMUSCULAR; INTRAVENOUS AS NEEDED
Status: DISCONTINUED | OUTPATIENT
Start: 2019-06-27 | End: 2019-06-27 | Stop reason: HOSPADM

## 2019-06-27 RX ORDER — LIDOCAINE HYDROCHLORIDE 10 MG/ML
INJECTION, SOLUTION INFILTRATION; PERINEURAL AS NEEDED
Status: DISCONTINUED | OUTPATIENT
Start: 2019-06-27 | End: 2019-06-27 | Stop reason: HOSPADM

## 2019-06-27 RX ORDER — SODIUM CHLORIDE 9 MG/ML
INJECTION, SOLUTION INTRAVENOUS CONTINUOUS PRN
Status: COMPLETED | OUTPATIENT
Start: 2019-06-27 | End: 2019-06-27

## 2019-06-27 RX ORDER — MIDAZOLAM HYDROCHLORIDE 1 MG/ML
INJECTION INTRAMUSCULAR; INTRAVENOUS
Status: COMPLETED | OUTPATIENT
Start: 2019-06-27 | End: 2019-06-27

## 2019-06-27 RX ORDER — FLUMAZENIL 0.1 MG/ML
INJECTION INTRAVENOUS
Status: DISCONTINUED
Start: 2019-06-27 | End: 2019-06-27 | Stop reason: WASHOUT

## 2019-06-27 RX ADMIN — MIDAZOLAM HYDROCHLORIDE 1 MG: 1 INJECTION, SOLUTION INTRAMUSCULAR; INTRAVENOUS at 16:04

## 2019-06-27 RX ADMIN — METHOHEXITAL SODIUM 20 MG: 500 INJECTION, POWDER, LYOPHILIZED, FOR SOLUTION INTRAMUSCULAR; INTRAVENOUS; RECTAL at 16:08

## 2019-06-27 RX ADMIN — METFORMIN HYDROCHLORIDE 1000 MG: 1000 TABLET ORAL at 09:25

## 2019-06-27 RX ADMIN — MIDAZOLAM HYDROCHLORIDE 2 MG: 1 INJECTION, SOLUTION INTRAMUSCULAR; INTRAVENOUS at 15:56

## 2019-06-27 RX ADMIN — ENALAPRIL MALEATE 10 MG: 10 TABLET ORAL at 09:30

## 2019-06-27 RX ADMIN — METFORMIN HYDROCHLORIDE 1000 MG: 1000 TABLET ORAL at 21:17

## 2019-06-27 RX ADMIN — DOFETILIDE 250 MCG: 0.25 CAPSULE ORAL at 09:26

## 2019-06-27 RX ADMIN — METOPROLOL SUCCINATE 100 MG: 100 TABLET, EXTENDED RELEASE ORAL at 09:25

## 2019-06-27 RX ADMIN — WARFARIN SODIUM 7.5 MG: 7.5 TABLET ORAL at 09:25

## 2019-06-27 RX ADMIN — METHOHEXITAL SODIUM 10 MG: 500 INJECTION, POWDER, LYOPHILIZED, FOR SOLUTION INTRAMUSCULAR; INTRAVENOUS; RECTAL at 16:04

## 2019-06-27 RX ADMIN — DILTIAZEM HYDROCHLORIDE 30 MG: 30 TABLET, FILM COATED ORAL at 21:18

## 2019-06-27 RX ADMIN — CETIRIZINE HYDROCHLORIDE 10 MG: 10 TABLET, FILM COATED ORAL at 09:25

## 2019-06-27 RX ADMIN — METHOHEXITAL SODIUM 20 MG: 500 INJECTION, POWDER, LYOPHILIZED, FOR SOLUTION INTRAMUSCULAR; INTRAVENOUS; RECTAL at 15:59

## 2019-06-27 RX ADMIN — ENALAPRIL MALEATE 10 MG: 10 TABLET ORAL at 21:18

## 2019-06-27 RX ADMIN — MAGNESIUM SULFATE HEPTAHYDRATE 4 G: 40 INJECTION, SOLUTION INTRAVENOUS at 10:27

## 2019-06-27 RX ADMIN — DILTIAZEM HYDROCHLORIDE 30 MG: 30 TABLET, FILM COATED ORAL at 09:25

## 2019-06-27 RX ADMIN — PANTOPRAZOLE SODIUM 40 MG: 40 TABLET, DELAYED RELEASE ORAL at 09:25

## 2019-06-28 ENCOUNTER — APPOINTMENT (OUTPATIENT)
Dept: CARDIOLOGY | Facility: HOSPITAL | Age: 59
End: 2019-06-28

## 2019-06-28 VITALS
BODY MASS INDEX: 35.76 KG/M2 | DIASTOLIC BLOOD PRESSURE: 76 MMHG | HEART RATE: 91 BPM | TEMPERATURE: 98 F | RESPIRATION RATE: 18 BRPM | HEIGHT: 72 IN | SYSTOLIC BLOOD PRESSURE: 113 MMHG | WEIGHT: 264 LBS | OXYGEN SATURATION: 98 %

## 2019-06-28 LAB
ANION GAP SERPL CALCULATED.3IONS-SCNC: 12 MMOL/L (ref 5–15)
BH CV ECHO MEAS - AO ROOT AREA (BSA CORRECTED): 1.5
BH CV ECHO MEAS - AO ROOT AREA: 9.8 CM^2
BH CV ECHO MEAS - AO ROOT DIAM: 3 CM
BH CV ECHO MEAS - BSA(HAYCOCK): 2.5 M^2
BH CV ECHO MEAS - BSA: 2.4 M^2
BH CV ECHO MEAS - BZI_BMI: 35.8 KILOGRAMS/M^2
BH CV ECHO MEAS - BZI_METRIC_HEIGHT: 182.9 CM
BH CV ECHO MEAS - BZI_METRIC_WEIGHT: 119.8 KG
BH CV ECHO MEAS - EDV(CUBED): 147.4 ML
BH CV ECHO MEAS - EDV(TEICH): 134.3 ML
BH CV ECHO MEAS - EF(CUBED): 40.7 %
BH CV ECHO MEAS - EF(TEICH): 33.4 %
BH CV ECHO MEAS - ESV(CUBED): 87.4 ML
BH CV ECHO MEAS - ESV(TEICH): 89.5 ML
BH CV ECHO MEAS - FS: 16 %
BH CV ECHO MEAS - IVS/LVPW: 0.97
BH CV ECHO MEAS - IVSD: 1 CM
BH CV ECHO MEAS - LA DIMENSION: 5.2 CM
BH CV ECHO MEAS - LA/AO: 1.4
BH CV ECHO MEAS - LAD MAJOR: 4.7 CM
BH CV ECHO MEAS - LAT PEAK E' VEL: 9 CM/SEC
BH CV ECHO MEAS - LATERAL E/E' RATIO: 10.7
BH CV ECHO MEAS - LV MASS(C)D: 269.2 GRAMS
BH CV ECHO MEAS - LV MASS(C)DI: 112.3 GRAMS/M^2
BH CV ECHO MEAS - LVIDD: 4.8 CM
BH CV ECHO MEAS - LVIDS: 3.5 CM
BH CV ECHO MEAS - LVPWD: 1 CM
BH CV ECHO MEAS - MED PEAK E' VEL: 7 CM/SEC
BH CV ECHO MEAS - MEDIAL E/E' RATIO: 13.6
BH CV ECHO MEAS - MV DEC SLOPE: 427.9 CM/SEC^2
BH CV ECHO MEAS - MV DEC TIME: 0.09 SEC
BH CV ECHO MEAS - MV E MAX VEL: 97.7 CM/SEC
BH CV ECHO MEAS - MV P1/2T MAX VEL: 100.4 CM/SEC
BH CV ECHO MEAS - MV P1/2T: 68.8 MSEC
BH CV ECHO MEAS - MVA P1/2T LCG: 2.2 CM^2
BH CV ECHO MEAS - MVA(P1/2T): 3.2 CM^2
BH CV ECHO MEAS - PA ACC SLOPE: 628.3 CM/SEC^2
BH CV ECHO MEAS - PA ACC TIME: 0.12 SEC
BH CV ECHO MEAS - PA PR(ACCEL): 23.1 MMHG
BH CV ECHO MEAS - PULM SYS VEL: 70.1 CM/SEC
BH CV ECHO MEAS - RAP SYSTOLE: 3 MMHG
BH CV ECHO MEAS - RV MAX PG: 1.2 MMHG
BH CV ECHO MEAS - RV V1 MAX: 54.3 CM/SEC
BH CV ECHO MEAS - RVSP: 13 MMHG
BH CV ECHO MEAS - SI(CUBED): 25 ML/M^2
BH CV ECHO MEAS - SI(TEICH): 18.7 ML/M^2
BH CV ECHO MEAS - SV(CUBED): 60 ML
BH CV ECHO MEAS - SV(TEICH): 44.9 ML
BH CV ECHO MEAS - TAPSE (>1.6): 1.8 CM2
BH CV ECHO MEAS - TR MAX PG: 10 MMHG
BH CV ECHO MEAS - TR MAX VEL: 160.6 CM/SEC
BH CV ECHO MEASUREMENTS AVERAGE E/E' RATIO: 12.21
BH CV XLRA - RV BASE: 4.8 CM
BH CV XLRA - RV LENGTH: 7.1 CM
BH CV XLRA - RV MID: 3.9 CM
BH CV XLRA - TDI S': 11 CM/SEC
BUN BLD-MCNC: 12 MG/DL (ref 8–23)
BUN/CREAT SERPL: 13.3 (ref 7–25)
CALCIUM SPEC-SCNC: 9.3 MG/DL (ref 8.6–10.5)
CHLORIDE SERPL-SCNC: 104 MMOL/L (ref 98–107)
CO2 SERPL-SCNC: 22 MMOL/L (ref 22–29)
CREAT BLD-MCNC: 0.9 MG/DL (ref 0.76–1.27)
GFR SERPL CREATININE-BSD FRML MDRD: 86 ML/MIN/1.73
GLUCOSE BLD-MCNC: 122 MG/DL (ref 65–99)
GLUCOSE BLDC GLUCOMTR-MCNC: 142 MG/DL (ref 70–130)
GLUCOSE BLDC GLUCOMTR-MCNC: 158 MG/DL (ref 70–130)
INR PPP: 1.87 (ref 0.85–1.16)
LEFT ATRIUM VOLUME INDEX: 41 ML/M2
MAGNESIUM SERPL-MCNC: 1.8 MG/DL (ref 1.6–2.4)
MAXIMAL PREDICTED HEART RATE: 160 BPM
POTASSIUM BLD-SCNC: 4.5 MMOL/L (ref 3.5–5.2)
PROTHROMBIN TIME: 20.7 SECONDS (ref 11.2–14.3)
SODIUM BLD-SCNC: 138 MMOL/L (ref 136–145)
STRESS TARGET HR: 136 BPM

## 2019-06-28 PROCEDURE — 83735 ASSAY OF MAGNESIUM: CPT | Performed by: NURSE PRACTITIONER

## 2019-06-28 PROCEDURE — 99238 HOSP IP/OBS DSCHRG MGMT 30/<: CPT | Performed by: INTERNAL MEDICINE

## 2019-06-28 PROCEDURE — 93005 ELECTROCARDIOGRAM TRACING: CPT | Performed by: INTERNAL MEDICINE

## 2019-06-28 PROCEDURE — 93010 ELECTROCARDIOGRAM REPORT: CPT | Performed by: INTERNAL MEDICINE

## 2019-06-28 PROCEDURE — 80048 BASIC METABOLIC PNL TOTAL CA: CPT

## 2019-06-28 PROCEDURE — 93306 TTE W/DOPPLER COMPLETE: CPT | Performed by: INTERNAL MEDICINE

## 2019-06-28 PROCEDURE — 82962 GLUCOSE BLOOD TEST: CPT

## 2019-06-28 PROCEDURE — 85610 PROTHROMBIN TIME: CPT | Performed by: NURSE PRACTITIONER

## 2019-06-28 PROCEDURE — 93306 TTE W/DOPPLER COMPLETE: CPT

## 2019-06-28 RX ORDER — METOPROLOL SUCCINATE 100 MG/1
100 TABLET, EXTENDED RELEASE ORAL
Status: DISCONTINUED | OUTPATIENT
Start: 2019-06-28 | End: 2019-06-28

## 2019-06-28 RX ORDER — METOPROLOL SUCCINATE 100 MG/1
200 TABLET, EXTENDED RELEASE ORAL DAILY
Qty: 60 TABLET | Refills: 6 | Status: SHIPPED | OUTPATIENT
Start: 2019-06-28 | End: 2019-06-29 | Stop reason: SDUPTHER

## 2019-06-28 RX ORDER — LANOLIN ALCOHOL/MO/W.PET/CERES
400 CREAM (GRAM) TOPICAL DAILY
Qty: 30 TABLET | Refills: 6 | Status: SHIPPED | OUTPATIENT
Start: 2019-06-28 | End: 2019-06-29 | Stop reason: SDUPTHER

## 2019-06-28 RX ORDER — WARFARIN SODIUM 10 MG/1
10 TABLET ORAL DAILY
Qty: 30 TABLET | Refills: 6 | Status: SHIPPED | OUTPATIENT
Start: 2019-06-28 | End: 2019-06-29 | Stop reason: SDUPTHER

## 2019-06-28 RX ORDER — WARFARIN SODIUM 5 MG/1
10 TABLET ORAL DAILY
Status: COMPLETED | OUTPATIENT
Start: 2019-06-28 | End: 2019-06-28

## 2019-06-28 RX ORDER — METOPROLOL SUCCINATE 100 MG/1
200 TABLET, EXTENDED RELEASE ORAL DAILY
Status: DISCONTINUED | OUTPATIENT
Start: 2019-06-28 | End: 2019-06-28 | Stop reason: HOSPADM

## 2019-06-28 RX ADMIN — MAGNESIUM SULFATE HEPTAHYDRATE 4 G: 40 INJECTION, SOLUTION INTRAVENOUS at 08:36

## 2019-06-28 RX ADMIN — WARFARIN SODIUM 10 MG: 5 TABLET ORAL at 08:35

## 2019-06-28 RX ADMIN — CETIRIZINE HYDROCHLORIDE 10 MG: 10 TABLET, FILM COATED ORAL at 08:36

## 2019-06-28 RX ADMIN — METFORMIN HYDROCHLORIDE 1000 MG: 1000 TABLET ORAL at 08:35

## 2019-06-28 RX ADMIN — PANTOPRAZOLE SODIUM 40 MG: 40 TABLET, DELAYED RELEASE ORAL at 08:35

## 2019-06-28 RX ADMIN — METOPROLOL SUCCINATE 200 MG: 100 TABLET, EXTENDED RELEASE ORAL at 08:35

## 2019-06-29 ENCOUNTER — READMISSION MANAGEMENT (OUTPATIENT)
Dept: CALL CENTER | Facility: HOSPITAL | Age: 59
End: 2019-06-29

## 2019-06-29 RX ORDER — WARFARIN SODIUM 10 MG/1
10 TABLET ORAL DAILY
Qty: 30 TABLET | Refills: 6 | Status: SHIPPED | OUTPATIENT
Start: 2019-06-29 | End: 2020-07-10 | Stop reason: SDUPTHER

## 2019-06-29 RX ORDER — METOPROLOL SUCCINATE 100 MG/1
200 TABLET, EXTENDED RELEASE ORAL DAILY
Qty: 60 TABLET | Refills: 6 | Status: SHIPPED | OUTPATIENT
Start: 2019-06-29 | End: 2020-02-24 | Stop reason: SDUPTHER

## 2019-06-29 RX ORDER — LANOLIN ALCOHOL/MO/W.PET/CERES
400 CREAM (GRAM) TOPICAL DAILY
Qty: 30 TABLET | Refills: 6 | Status: SHIPPED | OUTPATIENT
Start: 2019-06-29 | End: 2020-01-31 | Stop reason: SDUPTHER

## 2019-06-29 NOTE — OUTREACH NOTE
Prep Survey      Responses   Facility patient discharged from?  [place]   Is patient eligible?  Yes   Discharge diagnosis  Afib, s/p External electrical cardioversion, unsuccessful,    Does the patient have one of the following disease processes/diagnoses(primary or secondary)?  CHF   Does the patient have Home health ordered?  No   Is there a DME ordered?  No   Comments regarding appointments  See AVS   Prep survey completed?  Yes          Tiffanie Tyson RN

## 2019-07-01 NOTE — DISCHARGE SUMMARY
[facility] Cardiology Services  DISCHARGE SUMMARY    Date of Discharge:  6/28/19  Discharge Diagnosis: Persistent atrial fibrillation    Presenting Problem/History of Present Illness  A-fib (CMS/[facility]) [I48.91]  Atrial fibrillation, persistent (CMS/[facility]) [I48.1]    Discharge Problem List:  1. Persistent atrial fibrillation  a. CHADSVASc = 3, on warfarin  b. Diagnosed ~2000 at time of colonoscopy, asymptomatic  c. Admitted for Tikosyn initiation, 06/2019, failed ECV x 3, ERAF after ICV, Tikosyn discontinued  2. NICM  a. Echocardiogram 12/28/2018: EF 40%, LA size 5.2 cm in atrial fibrillation during exam  3. Coronary artery disease  a. Nuclear stress test 2/13/2019: Moderate size, moderate intensity anteroseptal wall reversible defect suggestive of ischemia, EF 37%  b. Left heart catheterization 3/6/2019: Nonobstructive, noncritical coronary artery disease to the distal RCA, 20-30%, EF 35-40%  4. Diabetes mellitus type II  5. Hypertension  6. Anxiety  7. Arthritis  8. Surgical history  a. Foot surgery  b. Hand surgery      Patient is a 59 year old  male with the above noted past medical history who presents today to undergo MING followed by Tikosyn initiation.  He was diagnosed with atrial fibrillation in approximately 2000 at the time of a colonoscopy.  He was completely asymptomatic at that time and believes he has been in atrial fibrillation since then.  He denies any hx of ECV or AAD use.  Echocardiogram in December demonstrated a reduced EF of approximately 40%.  Subsequently, he underwent stress test in February which was noted to be abnormal.  He then underwent LHC in March which only showed 20-30% distal RCA disease.  Given that he only had a mild, nonobstructive disease on his heart cath, it was felt that his cardiomyopathy was secondary to atrial fibrillation with RVR.  He has a CHADSVASc score of 3 and is anticoagulated with warfarin with recent subtherapeutic levels.  Symptoms with  atrial fibrillation include rare palpitations, described as mildly severe in nature and only occurs occasionally if he is resting, symptoms exacerbated by nothing, relieved by nothing.  He only feels his palpitations at most once weekly.  Does not routinely check HR at home.  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  HTN well controlled on meds.  No s/s suggestive of MO.  GERD well controlled on Protonix.  No known hx of thyroid issues.  He is a non-smoker, no ETOH, 2 decaf beverages daily.          Hospital Course  The patient was admitted to the telemetry floor on 6/25/19 for Tikosyn initiation. On admission, the patient's labs were reviewed, pharmacy was consulted and the patient's dose was calculated.  He underwent MING upon admission secondary to subtherapeutic INR's.  MING demonstrating no evidence of KASSIE thrombus.  Tikosyn was initiated at 500 mcg BID and the QTc interval was followed per protocol.  His dose was decreased to 250 mcg bid secondary to mild QTc prolongation.  After 5 doses, the patient underwent ECV x 3 which was unsuccessful in restoring normal rhythm.  Subsequently, he underwent ICV but developed ERAF.  His Tikosyn was discontinued.  Repeat echo showed an EF of 31-35%.  He was initiated on Entresto and placed on a LifeVest with ultimate goal of rate control and possible AVN + Bi-V PPM in the future if unable to control rates or no improvement in EF.  On 6/28/19, the patient is stable and felt appropriate for discharge home with plan for follow up with Alonso Welch PA-C at previously scheduled appointment on 8/27/19.    Procedures Performed  Procedure(s):  CARDIOVERSION     Condition on Discharge:  Stable    Physical Exam at Discharge  Vital Signs:  General Appearance:    Alert, cooperative, in no acute distress   Lungs:    Clear to auscultation bilaterally normal respiratory effort.    Heart:   Irregular irregular rate and rhythm normal S1-S2.  No murmurs are appreciated today.    Chest Wall:    No abnormalities observed   Abdomen:     Normal bowel sounds, no masses, no organomegaly, soft        non-tender, non-distended, no guarding, no rebound                Tenderness, benign.   Extremities:   Moves all extremities well, no edema, no cyanosis, no             redness   Pulses:   Pulses palpable and equal bilaterally   Skin:   No bleeding, bruising or rash. Mild burn area at site of cardioversion pad.       Physical Exam:  General Appearance:    Alert, cooperative, in no acute distress   Lungs:    Clear to auscultation bilaterally normal respiratory effort.    Heart:   Irregular irregular rate and rhythm normal S1-S2.  No murmurs are appreciated today.   Chest Wall:    No abnormalities observed   Abdomen:     Normal bowel sounds, no masses, no organomegaly, soft   non-tender, non-distended, no guarding, no rebound   Tenderness, benign.   Extremities:   Moves all extremities well, no edema, no cyanosis, no   redness   Pulses:   Pulses palpable and equal bilaterally   Skin:   No bleeding, bruising or rash. Mild burn area at site of cardioversion pad.       Discharge Disposition: Home    Discharge Diet: Cardiac heart healthy diet    Activity at Discharge: As tolerated    Follow-up Appointments  Future Appointments   Date Time Provider Department Center   8/27/2019 11:30 AM Paramjit Welch PA [facility] None     Additional Instructions for the Follow-ups that You Need to Schedule     Discharge Follow-up with Specialty: Dr. Vergara; 2 Months   As directed      Specialty:  Dr. Vergara    Follow Up:  2 Months         Basic Metabolic Panel    Jul 03, 2019 (Approximate)      Fax to #[phone]    Order Comments:  Fax to #[phone]          Protime-INR    Jul 03, 2019 (Approximate)            Discharge Medications     Discharge Medications      New Medications      Instructions Start Date   Magnesium Oxide 400 (240 Mg) MG tablet   400 mg, Oral, Daily      sacubitril-valsartan 24-26 MG  tablet  Commonly known as:  ENTRESTO   1 tablet, Oral, Every 12 Hours Scheduled         Changes to Medications      Instructions Start Date   metoprolol succinate  MG 24 hr tablet  Commonly known as:  TOPROL-XL  What changed:  how much to take   200 mg, Oral, Daily      warfarin 10 MG tablet  Commonly known as:  COUMADIN  What changed:    · when to take this  · additional instructions  · Another medication with the same name was removed. Continue taking this medication, and follow the directions you see here.   10 mg, Oral, Daily         Continue These Medications      Instructions Start Date   cetirizine 10 MG tablet  Commonly known as:  zyrTEC   10 mg, Oral, Daily      metFORMIN 1000 MG tablet  Commonly known as:  GLUCOPHAGE   1,000 mg, Oral, 2 Times Daily With Meals      pantoprazole 40 MG EC tablet  Commonly known as:  PROTONIX   40 mg, Oral, Daily         Stop These Medications    diltiaZEM 30 MG tablet  Commonly known as:  CARDIZEM     enalapril 5 MG tablet  Commonly known as:  VASOTEC             Electronically signed by PAOLA Bo, 07/01/19, 7:56 AM.

## 2019-07-02 ENCOUNTER — READMISSION MANAGEMENT (OUTPATIENT)
Dept: CALL CENTER | Facility: HOSPITAL | Age: 59
End: 2019-07-02

## 2019-07-02 NOTE — OUTREACH NOTE
CHF Week 1 Survey      Responses   Facility patient discharged from?  [place]   Does the patient have one of the following disease processes/diagnoses(primary or secondary)?  CHF   Is there a successful TCM telephone encounter documented?  No   CHF Week 1 attempt successful?  No   Unsuccessful attempts  Attempt 1          Heidi Spencer, RN

## 2019-07-03 ENCOUNTER — READMISSION MANAGEMENT (OUTPATIENT)
Dept: CALL CENTER | Facility: HOSPITAL | Age: 59
End: 2019-07-03

## 2019-07-03 NOTE — OUTREACH NOTE
CHF Week 1 Survey      Responses   Facility patient discharged from?  [place]   Does the patient have one of the following disease processes/diagnoses(primary or secondary)?  CHF   Is there a successful TCM telephone encounter documented?  No   CHF Week 1 attempt successful?  No   Unsuccessful attempts  Attempt 2          Saadia Wilde RN

## 2019-07-07 ENCOUNTER — READMISSION MANAGEMENT (OUTPATIENT)
Dept: CALL CENTER | Facility: HOSPITAL | Age: 59
End: 2019-07-07

## 2019-07-07 NOTE — OUTREACH NOTE
CHF Week 2 Survey      Responses   Facility patient discharged from?  [place]   Does the patient have one of the following disease processes/diagnoses(primary or secondary)?  CHF   Week 2 attempt successful?  Yes   Call start time  0906   Call end time  0911   Meds reviewed with patient/caregiver?  Yes   Is the patient taking all medications as directed (includes completed medication regime)?  Yes   Has the patient kept scheduled appointments due by today?  Yes   Did the patient receive a copy of their discharge instructions?  Yes   What is the patient's perception of their health status since discharge?  Improving   Is the patient weighing daily?  No [States no one told him to weigh daily.]   Does the patient have scales?  No [He will buy a set today.]   Daily weight interventions  Education provided on importance of daily weight   Is the patient able to teach back Heart Failure diet management?  Yes   Is the patient able to teach back Heart Failure Zones?  Yes   CHF Week 2 call completed?  Yes          Alyssa Malcolm RN

## 2019-07-15 ENCOUNTER — READMISSION MANAGEMENT (OUTPATIENT)
Dept: CALL CENTER | Facility: HOSPITAL | Age: 59
End: 2019-07-15

## 2019-07-15 NOTE — OUTREACH NOTE
CHF Week 3 Survey      Responses   Facility patient discharged from?  [place]   Does the patient have one of the following disease processes/diagnoses(primary or secondary)?  CHF   Week 3 attempt successful?  No   Unsuccessful attempts  Attempt 1          Saadia Wilde RN

## 2019-07-17 ENCOUNTER — READMISSION MANAGEMENT (OUTPATIENT)
Dept: CALL CENTER | Facility: HOSPITAL | Age: 59
End: 2019-07-17

## 2019-07-17 NOTE — OUTREACH NOTE
CHF Week 3 Survey      Responses   Facility patient discharged from?  [place]   Does the patient have one of the following disease processes/diagnoses(primary or secondary)?  CHF   Week 3 attempt successful?  No   Unsuccessful attempts  Attempt 2          Nadeen Perez RN

## 2019-07-25 ENCOUNTER — TELEPHONE (OUTPATIENT)
Dept: CARDIOLOGY | Facility: CLINIC | Age: 59
End: 2019-07-25

## 2019-07-25 NOTE — TELEPHONE ENCOUNTER
Pt called states that his insurance [employer] will not pay for his entresto because his EF is not less than 40%. I told the pt to call them and get a fax number and we can fax his last echo with an EF of 31-35%. Pt will call us back with a number.

## 2019-07-29 NOTE — TELEPHONE ENCOUNTER
The patient's pharmacy called to see if we had completed the PA on Entresto? I called the insurance company to complete the PA. They informed me that a PA was already submitted and denied on 6/28/19.  I tried to call the patient . No answer. I left a message to see if he had patient assistance or samples?

## 2019-07-30 NOTE — TELEPHONE ENCOUNTER
I initiated an appeal and sent all of the supporting documents to [employer] (last echo and d/c summary) [phone]. Case # [id number]

## 2019-08-27 ENCOUNTER — OFFICE VISIT (OUTPATIENT)
Dept: CARDIOLOGY | Facility: CLINIC | Age: 59
End: 2019-08-27

## 2019-08-27 VITALS
OXYGEN SATURATION: 97 % | HEART RATE: 93 BPM | WEIGHT: 270 LBS | SYSTOLIC BLOOD PRESSURE: 114 MMHG | HEIGHT: 72 IN | BODY MASS INDEX: 36.57 KG/M2 | DIASTOLIC BLOOD PRESSURE: 78 MMHG

## 2019-08-27 DIAGNOSIS — I48.19 ATRIAL FIBRILLATION, PERSISTENT (HCC): Primary | ICD-10-CM

## 2019-08-27 DIAGNOSIS — I10 ESSENTIAL HYPERTENSION: ICD-10-CM

## 2019-08-27 DIAGNOSIS — I42.8 NICM (NONISCHEMIC CARDIOMYOPATHY) (HCC): ICD-10-CM

## 2019-08-27 DIAGNOSIS — I25.10 CORONARY ARTERY DISEASE INVOLVING NATIVE CORONARY ARTERY OF NATIVE HEART WITHOUT ANGINA PECTORIS: ICD-10-CM

## 2019-08-27 PROCEDURE — 93000 ELECTROCARDIOGRAM COMPLETE: CPT | Performed by: PHYSICIAN ASSISTANT

## 2019-08-27 PROCEDURE — 99213 OFFICE O/P EST LOW 20 MIN: CPT | Performed by: PHYSICIAN ASSISTANT

## 2019-08-27 NOTE — PROGRESS NOTES
[facility] at [facility]   OFFICE NOTE      Tito Brennan  1960  PCP: Gabe Mims MD    SUBJECTIVE:   Tito Brennan is a 59 y.o. male seen for a follow up visit regarding the following:     CC:NICM    HPI:   59-year-old gentleman with persistent atrial fibrillation, NICM,  HTN, and chronic class II systolic heart failure presents for further eval after recent hospitalization for Tikosyn and attempted cardioversion.  Mr. Brennan was admitted to [facility] for initiation Tikosyn and cardioversion procedure.  However he was unable to be restored to normal rhythm despite multiple cardioversions and internal cardioversion.  He also had an echocardiogram revealing his EF is 30 to 35%.  Therefore he is been placed on a LifeVest and maximized on medical therapy including Toprol-XL and Entresto therapy.  He has had a previous heart catheterization in March 2019 revealing nonobstructive coronary artery disease.  Reports he is tolerating medications well the exception of occasionally has to take only half of his Toprol because of episodes of borderline blood pressure and bradycardia.  On most days he is able to take up to 200 mg daily.  Some days he takes only 100.  He has been compliant with his Entresto has not missed any doses.  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  He denies any worsening symptoms of heart failure such as orthopnea PND or peripheral edema.  He denies any sudden syncope or LifeVest shocks.  He denies any chest pain.    Cardiac PMH: (Old records have been reviewed and summarized below)  1. Persistent atrial fibrillation  a. CHADSVASc = 3, on warfarin  b. Diagnosed ~2000 at time of colonoscopy, asymptomatic  c. Admitted for Tikosyn initiation, 06/2019, failed ECV x 3, ERAF after ICV, Tikosyn discontinued  2. NICM  a. Echocardiogram 12/28/2018: EF 40%, LA size 5.2 cm in atrial fibrillation during exam  3. Coronary artery disease  a. Nuclear  stress test 2/13/2019: Moderate size, moderate intensity anteroseptal wall reversible defect suggestive of ischemia, EF 37%  b. Left heart catheterization 3/6/2019: Nonobstructive, noncritical coronary artery disease to the distal RCA, 20-30%, EF 35-40%  4. Diabetes mellitus type II  5. Hypertension    Past Medical History, Past Surgical History, Family history, Social History, and Medications were all reviewed with the patient today and updated as necessary.       Current Outpatient Medications:   •  cetirizine (zyrTEC) 10 MG tablet, Take 10 mg by mouth Daily., Disp: , Rfl:   •  Magnesium Oxide 400 (240 Mg) MG tablet, Take 1 tablet by mouth Daily., Disp: 30 tablet, Rfl: 6  •  metFORMIN (GLUCOPHAGE) 1000 MG tablet, Take 1,000 mg by mouth 2 (Two) Times a Day With Meals., Disp: , Rfl:   •  metoprolol succinate XL (TOPROL-XL) 100 MG 24 hr tablet, Take 2 tablets by mouth Daily., Disp: 60 tablet, Rfl: 6  •  pantoprazole (PROTONIX) 40 MG EC tablet, Take 40 mg by mouth Daily., Disp: , Rfl:   •  sacubitril-valsartan (ENTRESTO) 24-26 MG tablet, Take 1 tablet by mouth Every 12 (Twelve) Hours., Disp: 60 tablet, Rfl: 6  •  warfarin (COUMADIN) 10 MG tablet, Take 1 tablet by mouth Daily., Disp: 30 tablet, Rfl: 6      No Known Allergies  Patient Active Problem List   Diagnosis   • Atrial fibrillation, persistent (CMS/HCC)   • NICM (nonischemic cardiomyopathy) (CMS/HCC)   • Essential hypertension   • Diabetes mellitus type II, controlled (CMS/HCC)   • Coronary artery disease involving native coronary artery of native heart without angina pectoris     Past Medical History:   Diagnosis Date   • Arthritis    • Atrial fibrillation (CMS/HCC)    • Diabetes mellitus (CMS/HCC)     diagnosed 2015, checks fsbg daily    • GERD (gastroesophageal reflux disease)    • Hearing loss     left ear   • Hypertension    • Infection     right arm, fell and received IV abx    • Wears glasses      Past Surgical History:   Procedure Laterality Date   •  "CARDIAC CATHETERIZATION  2019   • CARDIAC ELECTROPHYSIOLOGY PROCEDURE N/A 6/27/2019    Procedure: CARDIOVERSION;  Surgeon: Adam Vergara MD;  Location: [facility] INVASIVE LOCATION;  Service: Cardiology   • FOOT SURGERY Left    • HAND SURGERY Left      History reviewed. No pertinent family history.  Social History     Tobacco Use   • Smoking status: Never Smoker   • Smokeless tobacco: Never Used   Substance Use Topics   • Alcohol use: No     Frequency: Never       ROS:  Review of Symptoms:  General: no recent weight loss/gain, weakness or fatigue  Skin: no rashes, lumps, or other skin changes  HEENT: no dizziness, lightheadedness, or vision changes  Respiratory: no cough or hemoptysis  Cardiovascular: no palpitations, and tachycardia  Gastrointestinal: no black/tarry stools or diarrhea  Urinary: no change in frequency or urgency  Peripheral Vascular: no claudication or leg cramps  Musculoskeletal: no muscle or joint pain/stiffness. + dupuytren  contracture  Both hands.   Psychiatric: no depression or excessive stress  Neurological: no sensory or motor loss, no syncope  Hematologic: no anemia, easy bruising or bleeding  Endocrine: no thyroid problems, nor heat or cold intolerance    PHYSICAL EXAM:    /78 (BP Location: Left arm, Patient Position: Sitting)   Pulse 93   Ht 182.9 cm (72\")   Wt 122 kg (270 lb)   SpO2 97%   BMI 36.62 kg/m²        Wt Readings from Last 5 Encounters:   08/27/19 122 kg (270 lb)   06/25/19 120 kg (264 lb)   04/17/19 120 kg (264 lb)       BP Readings from Last 5 Encounters:   08/27/19 114/78   06/28/19 113/76   06/25/19 135/99   04/17/19 120/88       General appearance - Alert, well appearing, and in no distress , Patient wearing life vest.   Mental status - Affect appropriate to mood.  Eyes - Sclerae anicteric,  ENMT - Hearing grossly normal bilaterally, Dental hygiene good.  Neck - Carotids upstroke normal bilaterally, no bruits, no JVD.  Resp - Clear to auscultation, no " wheezes, rales or rhonchi, symmetric air entry.  Heart -IRIR normal S1, S2, no murmurs, rubs, clicks or gallops.  GI - Soft, nontender, nondistended, no masses or organomegaly.  Neurological - Grossly intact - normal speech, no focal findings  Musculoskeletal - No joint tenderness, deformity or swelling, no muscular tenderness noted.  Extremities - Peripheral pulses normal, no pedal edema, no clubbing or cyanosis.  Skin - Normal coloration and turgor.  Psych -  oriented to person, place, and time.    Medical problems and test results were reviewed with the patient today.     No results found for this or any previous visit (from the past 672 hour(s)).      EKG: (EKG has been independently visualized by me and summarized below)    ECG 12 Lead  Date/Time: 8/27/2019 12:31 PM  Performed by: Paramjit Welch PA  Authorized by: Paramjit Welch PA   Comparison: compared with previous ECG from 7/3/2019  Similar to previous ECG  Rhythm: atrial fibrillation  Rate: tachycardic  Conduction: conduction normal  QRS axis: normal  Other findings: poor R wave progression    Clinical impression: abnormal EKG            ASSESSMENT   1. NICM: EF 35%, Toprol XL, Entresto.    2. Chronic class II SHF  3. Permanent Afib:  Failed,Tikosyn. ERAF after Cardioversion 6/19.  Continue to focus on rate conrol.   4. Anticoagulation:  Coumadin, stable INR's.     PLAN  · Continue current medical therapy focus on restricting sodium and fluid intake monitor daily weights.  · Follow-up echocardiogram October with follow-up visit at that time.  We discussed that if his EF remains less than 35% we may consider implanting a permanent ICD for primary prevention.  · Follow-up in 1 month.      8/27/2019  11:48 AM    Will Rebekah HERNANDEZ

## 2019-10-10 ENCOUNTER — OFFICE VISIT (OUTPATIENT)
Dept: CARDIOLOGY | Facility: CLINIC | Age: 59
End: 2019-10-10

## 2019-10-10 ENCOUNTER — HOSPITAL ENCOUNTER (OUTPATIENT)
Dept: CARDIOLOGY | Facility: HOSPITAL | Age: 59
Discharge: HOME | End: 2019-10-10
Admitting: PHYSICIAN ASSISTANT

## 2019-10-10 VITALS
WEIGHT: 270 LBS | HEART RATE: 78 BPM | HEIGHT: 72 IN | OXYGEN SATURATION: 98 % | SYSTOLIC BLOOD PRESSURE: 142 MMHG | DIASTOLIC BLOOD PRESSURE: 74 MMHG | BODY MASS INDEX: 36.57 KG/M2

## 2019-10-10 VITALS — WEIGHT: 270 LBS | BODY MASS INDEX: 36.57 KG/M2 | HEIGHT: 72 IN

## 2019-10-10 DIAGNOSIS — I25.10 CORONARY ARTERY DISEASE INVOLVING NATIVE CORONARY ARTERY OF NATIVE HEART WITHOUT ANGINA PECTORIS: ICD-10-CM

## 2019-10-10 DIAGNOSIS — I42.8 NICM (NONISCHEMIC CARDIOMYOPATHY) (HCC): ICD-10-CM

## 2019-10-10 DIAGNOSIS — I48.19 ATRIAL FIBRILLATION, PERSISTENT (HCC): ICD-10-CM

## 2019-10-10 DIAGNOSIS — I42.8 NICM (NONISCHEMIC CARDIOMYOPATHY) (HCC): Primary | ICD-10-CM

## 2019-10-10 DIAGNOSIS — I10 ESSENTIAL HYPERTENSION: ICD-10-CM

## 2019-10-10 LAB
BH CV ECHO MEAS - AO MAX PG (FULL): 3.5 MMHG
BH CV ECHO MEAS - AO MAX PG: 5.5 MMHG
BH CV ECHO MEAS - AO ROOT AREA (BSA CORRECTED): 1.6
BH CV ECHO MEAS - AO ROOT AREA: 11.5 CM^2
BH CV ECHO MEAS - AO ROOT DIAM: 3.8 CM
BH CV ECHO MEAS - AO V2 MAX: 117.3 CM/SEC
BH CV ECHO MEAS - AVA(V,A): 3.2 CM^2
BH CV ECHO MEAS - AVA(V,D): 3.2 CM^2
BH CV ECHO MEAS - BSA(HAYCOCK): 2.5 M^2
BH CV ECHO MEAS - BSA: 2.4 M^2
BH CV ECHO MEAS - BZI_BMI: 36.6 KILOGRAMS/M^2
BH CV ECHO MEAS - BZI_METRIC_HEIGHT: 182.9 CM
BH CV ECHO MEAS - BZI_METRIC_WEIGHT: 122.5 KG
BH CV ECHO MEAS - EDV(CUBED): 198.9 ML
BH CV ECHO MEAS - EDV(TEICH): 169 ML
BH CV ECHO MEAS - EF(CUBED): 53.4 %
BH CV ECHO MEAS - EF(TEICH): 44.5 %
BH CV ECHO MEAS - ESV(CUBED): 92.7 ML
BH CV ECHO MEAS - ESV(TEICH): 93.7 ML
BH CV ECHO MEAS - FS: 22.5 %
BH CV ECHO MEAS - IVS/LVPW: 0.9
BH CV ECHO MEAS - IVSD: 0.97 CM
BH CV ECHO MEAS - LA DIMENSION: 4.7 CM
BH CV ECHO MEAS - LA/AO: 1.2
BH CV ECHO MEAS - LAD MAJOR: 6.9 CM
BH CV ECHO MEAS - LV MASS(C)D: 242.1 GRAMS
BH CV ECHO MEAS - LV MASS(C)DI: 100.1 GRAMS/M^2
BH CV ECHO MEAS - LV MAX PG: 2 MMHG
BH CV ECHO MEAS - LV MEAN PG: 0.95 MMHG
BH CV ECHO MEAS - LV V1 MAX: 70.2 CM/SEC
BH CV ECHO MEAS - LV V1 MEAN: 44.6 CM/SEC
BH CV ECHO MEAS - LV V1 VTI: 11.9 CM
BH CV ECHO MEAS - LVIDD: 5.8 CM
BH CV ECHO MEAS - LVIDS: 4.5 CM
BH CV ECHO MEAS - LVOT AREA (M): 5.3 CM^2
BH CV ECHO MEAS - LVOT AREA: 5.4 CM^2
BH CV ECHO MEAS - LVOT DIAM: 2.6 CM
BH CV ECHO MEAS - LVPWD: 1.1 CM
BH CV ECHO MEAS - MED PEAK E' VEL: 5.5 CM/SEC
BH CV ECHO MEAS - MEDIAL E/E' RATIO: 14.1
BH CV ECHO MEAS - MV DEC TIME: 0.12 SEC
BH CV ECHO MEAS - MV E MAX VEL: 79.5 CM/SEC
BH CV ECHO MEAS - PA ACC SLOPE: 820.6 CM/SEC^2
BH CV ECHO MEAS - PA ACC TIME: 0.07 SEC
BH CV ECHO MEAS - PA MAX PG: 2.1 MMHG
BH CV ECHO MEAS - PA PR(ACCEL): 45.7 MMHG
BH CV ECHO MEAS - PA V2 MAX: 72.7 CM/SEC
BH CV ECHO MEAS - RAP SYSTOLE: 3 MMHG
BH CV ECHO MEAS - RVSP: 25 MMHG
BH CV ECHO MEAS - SI(CUBED): 43.9 ML/M^2
BH CV ECHO MEAS - SI(LVOT): 26.6 ML/M^2
BH CV ECHO MEAS - SI(TEICH): 31.1 ML/M^2
BH CV ECHO MEAS - SV(CUBED): 106.1 ML
BH CV ECHO MEAS - SV(LVOT): 64.4 ML
BH CV ECHO MEAS - SV(TEICH): 75.3 ML
BH CV ECHO MEAS - TAPSE (>1.6): 1.6 CM2
BH CV ECHO MEAS - TR MAX PG: 22 MMHG
BH CV ECHO MEAS - TR MAX VEL: 235.6 CM/SEC
BH CV VAS BP RIGHT ARM: NORMAL MMHG
BH CV XLRA - RV BASE: 4.4 CM
BH CV XLRA - RV LENGTH: 6.9 CM
BH CV XLRA - RV MID: 3.5 CM
BH CV XLRA - TDI S': 13 CM/SEC
LEFT ATRIUM VOLUME INDEX: 51.2 ML/M^2
LEFT ATRIUM VOLUME: 124 ML
LV EF 2D ECHO EST: 40 %
MAXIMAL PREDICTED HEART RATE: 160 BPM
STRESS TARGET HR: 136 BPM

## 2019-10-10 PROCEDURE — 93306 TTE W/DOPPLER COMPLETE: CPT | Performed by: INTERNAL MEDICINE

## 2019-10-10 PROCEDURE — 99213 OFFICE O/P EST LOW 20 MIN: CPT | Performed by: PHYSICIAN ASSISTANT

## 2019-10-10 PROCEDURE — 93306 TTE W/DOPPLER COMPLETE: CPT

## 2019-10-10 NOTE — PROGRESS NOTES
[facility] at [facility]   OFFICE NOTE      Tito Brennan  1960  PCP: Gabe Mims MD    SUBJECTIVE:   Tito Brennan is a 59 y.o. male seen for a follow up visit regarding the following:     CC:Afib    HPI:   Pleasant 59-year-old gentleman returns today follow-up regarding persistent atrial fibrillation, nonischemic cardiomyopathy, chronic congestive heart failure, and hypertension.  He continues to currently wear LifeVest.  He reports he is doing well on current medical regimen including his Entresto and rate control strategy with beta-blocker therapy.  He has had no worsening symptoms of shortness of breath, chest pain or heart failure.  He denies any dizziness near syncope or syncope.    Cardiac PMH: (Old records have been reviewed and summarized below)  1. Persistent atrial fibrillation  a. CHADSVASc = 3, on warfarin  b. Diagnosed ~2000 at time of colonoscopy, asymptomatic  c. Admitted for Tikosyn initiation, 06/2019, failed ECV x 3, ERAF after ICV, Tikosyn discontinued  2. NICM  a. Echocardiogram 12/28/2018: EF 40%, LA size 5.2 cm in atrial fibrillation during exam  3. Coronary artery disease  a. Nuclear stress test 2/13/2019: Moderate size, moderate intensity anteroseptal wall reversible defect suggestive of ischemia, EF 37%  b. Left heart catheterization 3/6/2019: Nonobstructive, noncritical coronary artery disease to the distal RCA, 20-30%, EF 35-40%  4. Diabetes mellitus type II  5. Hypertension    Past Medical History, Past Surgical History, Family history, Social History, and Medications were all reviewed with the patient today and updated as necessary.       Current Outpatient Medications:   •  cetirizine (zyrTEC) 10 MG tablet, Take 10 mg by mouth Daily., Disp: , Rfl:   •  Magnesium Oxide 400 (240 Mg) MG tablet, Take 1 tablet by mouth Daily., Disp: 30 tablet, Rfl: 6  •  metFORMIN (GLUCOPHAGE) 1000 MG tablet, Take 1,000 mg by mouth 2 (Two) Times a Day With Meals., Disp: ,  Rfl:   •  metoprolol succinate XL (TOPROL-XL) 100 MG 24 hr tablet, Take 2 tablets by mouth Daily., Disp: 60 tablet, Rfl: 6  •  pantoprazole (PROTONIX) 40 MG EC tablet, Take 40 mg by mouth Daily., Disp: , Rfl:   •  sacubitril-valsartan (ENTRESTO) 24-26 MG tablet, Take 1 tablet by mouth Every 12 (Twelve) Hours., Disp: 60 tablet, Rfl: 6  •  warfarin (COUMADIN) 10 MG tablet, Take 1 tablet by mouth Daily., Disp: 30 tablet, Rfl: 6      No Known Allergies  Patient Active Problem List   Diagnosis   • Atrial fibrillation, persistent   • NICM (nonischemic cardiomyopathy) (CMS/HCC)   • Essential hypertension   • Diabetes mellitus type II, controlled (CMS/HCC)   • Coronary artery disease involving native coronary artery of native heart without angina pectoris     Past Medical History:   Diagnosis Date   • Arthritis    • Atrial fibrillation (CMS/HCC)    • Diabetes mellitus (CMS/HCC)     diagnosed 2015, checks fsbg daily    • GERD (gastroesophageal reflux disease)    • Hearing loss     left ear   • History of echocardiogram    • Hypertension    • Infection     right arm, fell and received IV abx    • Wears glasses      Past Surgical History:   Procedure Laterality Date   • CARDIAC CATHETERIZATION  2019   • CARDIAC ELECTROPHYSIOLOGY PROCEDURE N/A 6/27/2019    Procedure: CARDIOVERSION;  Surgeon: Adam Vergara MD;  Location: [facility] INVASIVE LOCATION;  Service: Cardiology   • FOOT SURGERY Left    • HAND SURGERY Left      History reviewed. No pertinent family history.  Social History     Tobacco Use   • Smoking status: Never Smoker   • Smokeless tobacco: Never Used   Substance Use Topics   • Alcohol use: No     Frequency: Never       ROS:  Review of Symptoms:  General: no recent weight loss/gain, weakness or fatigue  Skin: no rashes, lumps, or other skin changes  HEENT: no dizziness, lightheadedness, or vision changes  Respiratory: no cough or hemoptysis  Cardiovascular: no palpitations, and tachycardia  Gastrointestinal: no  "black/tarry stools or diarrhea  Urinary: no change in frequency or urgency  Peripheral Vascular: no claudication or leg cramps  Musculoskeletal: no muscle or joint pain/stiffness  Psychiatric: no depression or excessive stress  Neurological: no sensory or motor loss, no syncope  Hematologic: no anemia, easy bruising or bleeding  Endocrine: no thyroid problems, nor heat or cold intolerance    PHYSICAL EXAM:    /74 (BP Location: Left arm, Patient Position: Sitting)   Pulse 78   Ht 182.9 cm (72\")   Wt 122 kg (270 lb)   SpO2 98%   BMI 36.62 kg/m²        Wt Readings from Last 5 Encounters:   10/10/19 122 kg (270 lb)   10/10/19 122 kg (270 lb)   08/27/19 122 kg (270 lb)   06/25/19 120 kg (264 lb)   04/17/19 120 kg (264 lb)       BP Readings from Last 5 Encounters:   10/10/19 142/74   08/27/19 114/78   06/28/19 113/76   06/25/19 135/99   04/17/19 120/88       General appearance - Alert, well appearing, and in no distress . Patient wearing lifevest.   Mental status - Affect appropriate to mood.  Eyes - Sclerae anicteric,  ENMT - Hearing grossly normal bilaterally, Dental hygiene good.  Neck - Carotids upstroke normal bilaterally, no bruits, no JVD.  Resp - Clear to auscultation, no wheezes, rales or rhonchi, symmetric air entry.  Heart - IRIR, normal S1, S2, no murmurs, rubs, clicks or gallops.  GI - Soft, nontender, nondistended, no masses or organomegaly.  Neurological - Grossly intact - normal speech, no focal findings  Musculoskeletal - No joint tenderness, deformity or swelling, no muscular tenderness noted.  Extremities - Peripheral pulses normal, + pedal edema, no clubbing or cyanosis.  Skin - Normal coloration and turgor.  Psych -  oriented to person, place, and time.    Medical problems and test results were reviewed with the patient today.       ASSESSMENT   1. NICM: EF 35%, Toprol XL, Entresto  2. Chronic class II SHF  3. Permanent Afib:  Failed Tikosyn.  ERAF after ECV 6/19.  4. Anticoagulation:  " Coumadin, Stable INRS.   5. [facility] 3/19  20-30% RCA disease treated with medical therapy.     PLAN  · Patient doing well on current medical regiment treatment for nonischemic cardiomyopathy with Toprol, Entresto.  · Review echocardiogram results EF appears to be improved to 40%  · Patient needs to monitor daily weights reduce sodium he is gained 6 pounds since April he states he would start to focus on these factors.  · Return to follow-up Dr. Vergara in 6 months or sooner as needed.    10/10/2019  1:33 PM    Will Rebekah HERNANDEZ

## 2019-10-15 ENCOUNTER — DOCUMENTATION (OUTPATIENT)
Dept: CARDIOLOGY | Facility: CLINIC | Age: 59
End: 2019-10-15

## 2019-10-15 NOTE — PROGRESS NOTES
Patient's ejection fraction is improved to over 40%.  His LifeVest was discontinued.  He states he is continued to do well.  He is unable to titrate Entresto to the higher dose because of marginal blood pressure.  He will continue medical therapy with return follow-up visit in 6 months.

## 2019-11-11 ENCOUNTER — TELEPHONE (OUTPATIENT)
Dept: CARDIOLOGY | Facility: CLINIC | Age: 59
End: 2019-11-11

## 2019-11-12 ENCOUNTER — TELEPHONE (OUTPATIENT)
Dept: CARDIOLOGY | Facility: CLINIC | Age: 59
End: 2019-11-12

## 2020-01-31 RX ORDER — LANOLIN ALCOHOL/MO/W.PET/CERES
400 CREAM (GRAM) TOPICAL DAILY
Qty: 30 TABLET | Refills: 6 | Status: SHIPPED | OUTPATIENT
Start: 2020-01-31 | End: 2020-09-02 | Stop reason: SDUPTHER

## 2020-02-24 ENCOUNTER — TELEPHONE (OUTPATIENT)
Dept: CARDIOLOGY | Facility: CLINIC | Age: 60
End: 2020-02-24

## 2020-02-24 RX ORDER — METOPROLOL SUCCINATE 100 MG/1
200 TABLET, EXTENDED RELEASE ORAL DAILY
Qty: 60 TABLET | Refills: 6 | Status: SHIPPED | OUTPATIENT
Start: 2020-02-24 | End: 2020-02-26 | Stop reason: SDUPTHER

## 2020-02-24 NOTE — TELEPHONE ENCOUNTER
Roxi, with [facility], checked the patient's INR yesterday and it was 3.27. Before surgery they would like his INR to be below 2.5. Please advise.      (P)[phone]

## 2020-02-26 ENCOUNTER — TELEPHONE (OUTPATIENT)
Dept: PHARMACY | Facility: HOSPITAL | Age: 60
End: 2020-02-26

## 2020-02-26 ENCOUNTER — DOCUMENTATION (OUTPATIENT)
Dept: CARDIOLOGY | Facility: CLINIC | Age: 60
End: 2020-02-26

## 2020-02-26 DIAGNOSIS — I48.0 PAROXYSMAL ATRIAL FIBRILLATION (HCC): Primary | ICD-10-CM

## 2020-02-26 RX ORDER — METOPROLOL SUCCINATE 100 MG/1
200 TABLET, EXTENDED RELEASE ORAL DAILY
Qty: 60 TABLET | Refills: 6 | Status: SHIPPED | OUTPATIENT
Start: 2020-02-26 | End: 2020-06-03 | Stop reason: SDUPTHER

## 2020-02-26 NOTE — PROGRESS NOTES
Spoke with Mr. Brennan on the phone today.  Patient is doing well from nonischemic cardiomyopathy.  He is tolerating medications well.  He wishes to change his Coumadin monitoring to our Coumadin clinic here at [facility].  He is scheduled for follow-up with Dr. Vergara in June and will keep this appointment.

## 2020-02-26 NOTE — TELEPHONE ENCOUNTER
I called and spoke with Antonia and made her aware that Will [facility] did call and speak with the patient. His PCP follows his INRs and doses his warfarin. She said that she will call him for orders.

## 2020-02-26 NOTE — TELEPHONE ENCOUNTER
Patient is a new referral from Alonso Welch PA-C today. Looks like patient is waiting for clearance to have oral surgery. Please see telephone encounter with Will dated 2/24/20.    Per Leyla, called [facility] (P)[phone] to discuss plan then will contact patient to introduce ourselves and give periop-plan.     Patient is on warfarin for atrial fibrillation. Goal likely 2.0-3.0 but will need to confirm.

## 2020-02-26 NOTE — TELEPHONE ENCOUNTER
Called and confirmed with VINH Jewell, at Dr. Gabe Mims's office that they manage Mr. Brennan's anticoagulation therapy.    LVM for patient regarding new referral to see if he prefers to stay with PCP or switch to [facility] for management.

## 2020-02-26 NOTE — TELEPHONE ENCOUNTER
The [facility] is calling back again this morning. The patient has not taken his warfarin all week. At this point they are going to check his INR today and see if it is below 2.5.

## 2020-02-27 DIAGNOSIS — I48.19 ATRIAL FIBRILLATION, PERSISTENT (HCC): Primary | ICD-10-CM

## 2020-02-27 NOTE — TELEPHONE ENCOUNTER
Spoke with Mr. Brennan re: new referral. He has previously been managed by his PCP Dr. Mims but wishes to switch to Dr. Vergara/clinic for management. His last INR yesterday drawn at [facility] yesterday resulting in a 1.38. He has been taking warfarin 10mg daily since 6/2019 as directed by Dr. Vergara since his discharge from the hospital. However, Mr. Brennan is having oral surgery today in [place] with Dr. Lauren at [facility] at 1545. He took 7.5mg on Sunday and has been holding his warfarin starting this past Monday night since his INR was too high (3.27 on Monday).     Spoke with Carl at Dr. Mims's office (PCP) [phone]. and informed them we will be taking over management of INR and requested records from last 3 months. Also called [facility] for hard copy of yesterday's INR.    Called and spoke with Roxi at Dr. Lauren's office. His INR from yesterday was 1.38. She spoke directly with Dr. Lauren who gave the ok to restart warfarin this evening.    Will send new standing order to [facility]. Referral already entered under provider Alonso Welch on 2/26/20.

## 2020-02-27 NOTE — TELEPHONE ENCOUNTER
Spoke with patient regarding plan for oral surgery. He verbalized understanding to restart warfarin 10mg tonight and recheck INR on Monday, 3/2.

## 2020-02-27 NOTE — TELEPHONE ENCOUNTER
LVM for patient to call back to go over plan regarding oral surgery.     Patient is to restart warfarin 10mg tonight and recheck INR on Monday, 3/2 at [facility].

## 2020-03-03 ENCOUNTER — ANTICOAGULATION VISIT (OUTPATIENT)
Dept: PHARMACY | Facility: HOSPITAL | Age: 60
End: 2020-03-03

## 2020-03-03 DIAGNOSIS — I48.0 PAROXYSMAL ATRIAL FIBRILLATION (HCC): ICD-10-CM

## 2020-03-03 LAB — INR PPP: 1.84

## 2020-03-03 NOTE — PROGRESS NOTES
Anticoagulation Clinic - Remote Progress Note  [[facility] MONITOR vs REMOTE LAB]  Frequency of Monitoring:     Indication:  Atrial fibrillation  Referring Provider:  Ursula  Initial Warfarin Start Date: 18-19 years ago  Goal INR:   Current Drug Interactions: pantaprozole (moderate)  CHADS-VASc: 3 (HF, HTN)  Bleed Risk: [HASBLED; HIGH/MODERATE/LOW + REASON; H/O BLEED]  Other: [PREVIOUS ANTICOAGULATION, ETC]      Diet: no leafy greens  Alcohol:  no  Tobacco: no  OTC Pain Medication: occationally tylenol for sinus HA    INR History:  Date 3/2/20             Total Weekly Dose 50 mg 70 mg            INR 1.84             Notes postop               Phone Interview:  Tablet Strength:  10 mg  Patient Contact Info: [phone]  Verbal Release Auth:   Lab Contact Info:  EDUARDO Gonzalez [phone]; fax [phone]    Patient Findings:  Negatives:  Signs/symptoms of thrombosis, Signs/symptoms of bleeding, Laboratory test error suspected, Change in health, Change in alcohol use, Change in activity, Upcoming invasive procedure, Emergency department visit, Upcoming dental procedure, Missed doses, Extra doses, Change in medications, Change in diet/appetite, Hospital admission, Bruising, Other complaints   Comments:  He was holding last week for oral surgery performed on Thursday 2/27. He had a couple of teeth cut out, he says.       Welcomed Tito Brennan to the [facility]. I introduced myself and discussed that we will assist with his warfarin monitoring and work with his cardiologist or other physicians to provide patient care. Encouraged patient to call the clinic with requests for warfarin refills, changes in medications / diet, change in health, or upcoming procedures / surgeries. Discussed signs and symptoms of bleeding, signs and symptoms of TIA / CVA, use of OTC pain medications, and alcohol / tobacco / dietary / other drug interactions in relation to warfarin. Explained that he will be testing his INR  more frequently in these first few weeks after holding for oral surgery as we try to adjust his dose and achieve a therapeutic INR x 2 consecutive readings. Once that is achieved, pt will follow up at remote lab every 4 weeks, on average. Furthermore, we discussed available dates to come to the [facility] for face to face meetings. Patient was agreeable to meeting twice per year. At this time, Tito Brennan did not have further questions or concerns. Provided patient with the clinic's contact information for future reference by mail. Will send written information by postal mail.      Plan:  1. INR is slighty subtherapeutic at 1.84 from yesterday after holding for oral surgery last week. Instructed pt to continue with warfarin 10 mg daily. His primary care provider was only checking his INR on a three month basis. He has been on 67.5mg/week - 70 mg/week of warfarin for his maintenance dose.  2. Repeat INR on Monday 3/9 at [facility].  3. Pt declines warfarin refills.  4. Verbal information provided over the phone to [Tito Brennan]. [Tito Brennan] expresses understanding by teach back, RBV dosing instructions, and has no further questions at this time.      Need to verify his goal range with EMEKA Welch. Sent a message 3/3/20.    Andrew Karimi RPH  3/3/2020  9:31 AM

## 2020-03-09 ENCOUNTER — TELEPHONE (OUTPATIENT)
Dept: PHARMACY | Facility: HOSPITAL | Age: 60
End: 2020-03-09

## 2020-03-10 ENCOUNTER — ANTICOAGULATION VISIT (OUTPATIENT)
Dept: PHARMACY | Facility: HOSPITAL | Age: 60
End: 2020-03-10

## 2020-03-10 ENCOUNTER — TELEPHONE (OUTPATIENT)
Dept: PHARMACY | Facility: HOSPITAL | Age: 60
End: 2020-03-10

## 2020-03-10 DIAGNOSIS — I48.0 PAROXYSMAL ATRIAL FIBRILLATION (HCC): ICD-10-CM

## 2020-03-10 LAB — INR PPP: 4.02

## 2020-03-10 NOTE — TELEPHONE ENCOUNTER
Alonso Welch,     Just wanted to verify Mr Brennan's goal INR is 2-3. Thanks!    Jordana Desir  [facility]

## 2020-03-10 NOTE — PROGRESS NOTES
Anticoagulation Clinic - Remote Progress Note  [[facility] vs REMOTE LAB]  Frequency of Monitoring:     Indication:  Atrial fibrillation  Referring Provider:  Ursula  Initial Warfarin Start Date: 18-19 years ago  Goal INR: verifying with FLAVIO Welch 3/10/20  Current Drug Interactions: pantaprozole (moderate)  CHADS-VASc: 3 (HF, HTN)  Bleed Risk: [HASBLED; HIGH/MODERATE/LOW + REASON; H/O BLEED]  Other: [PREVIOUS ANTICOAGULATION, ETC]      Diet: no leafy greens  Alcohol:  no  Tobacco: no  OTC Pain Medication: occationally tylenol for sinus HA    INR History:  Date 3/2/20 3/10            Total Weekly Dose 45 mg/5 days 70 mg            INR 1.84 4.02            Notes postop  1x hold             Phone Interview:  Tablet Strength:  10 mg  Patient Contact Info: [phone]  Verbal Release Auth:   Lab Contact Info:  EDUARDO Gonzalez [phone]; fax [phone]    Patient Findings:    Negatives:  Signs/symptoms of thrombosis, Signs/symptoms of bleeding, Laboratory test error suspected, Change in health, Change in alcohol use, Change in activity, Upcoming invasive procedure, Emergency department visit, Upcoming dental procedure, Missed doses, Extra doses, Change in medications, Change in diet/appetite, Hospital admission, Bruising, Other complaints   Comments:  Mr Brennan reports his previous maintenance dose was 10mg daily except 7.5mg once weekly until June when it was increased to 10mg daily which he has been taking since that time. He reports he was slightly supratherapeutic (3.2?) on this dose prior to dental extraction last week. Despite dental procedure, reports he has maintained a normal diet, denies any new medications including abx/steroids.   Has already taken today's dose.     Plan:  1. INR was supratherapeutic yesterday at 4.02 after resuming previous maintenance dose and a large increase from last week . Therefore, considering Mr Brennan has already taken both yesterday and today's dose, instructed pt to  HOLD tomorrow's dose then decrease dose to warfarin 10 mg daily except 7.5mg Saturday until recheck.  2. Repeat INR on Monday 3/16  4. Verbal information provided over the phone to [Tito Brennan]. [Tito Brennan] expresses understanding by teach back, RBV dosing instructions, and has no further questions at this time.      Need to verify his goal range with EMEKA Welch. Re-sent a message 3/10/20    Lazaro LynchD.  03/10/20   13:49

## 2020-03-16 ENCOUNTER — ANTICOAGULATION VISIT (OUTPATIENT)
Dept: PHARMACY | Facility: HOSPITAL | Age: 60
End: 2020-03-16

## 2020-03-16 DIAGNOSIS — I48.0 PAROXYSMAL ATRIAL FIBRILLATION (HCC): ICD-10-CM

## 2020-03-16 LAB — INR PPP: 3

## 2020-03-16 NOTE — PROGRESS NOTES
Anticoagulation Clinic - Remote Progress Note  [[facility] vs REMOTE LAB]  Frequency of Monitoring:     Indication:  Atrial fibrillation  Referring Provider:  Ursula  Initial Warfarin Start Date: 18-19 years ago (2001)  Goal INR: 2-3  Current Drug Interactions: pantaprozole (moderate)  CHADS-VASc: 3 (HF, HTN)  Bleed Risk: [HASBLED; HIGH/MODERATE/LOW + REASON; H/O BLEED]  Other: [PREVIOUS ANTICOAGULATION, ETC]    Diet: no leafy greens  Alcohol:  no  Tobacco: no  OTC Pain Medication: occationally tylenol for sinus HA    INR History:  Date 3/2/20 3/10 3/16           Total Weekly Dose 45 mg/5 days 70 mg 57.5mg 62.5mg          INR 1.84 4.02 3.00           Notes postop  1x hold             Phone Interview:  Tablet Strength:  10 mg  Patient Contact Info: [phone]  Verbal Release Auth:   Lab Contact Info:  EDUARDO Carlos [phone]; fax [phone]    Patient Findings:  Negatives:  Signs/symptoms of thrombosis, Signs/symptoms of bleeding, Laboratory test error suspected, Change in health, Change in alcohol use, Change in activity, Upcoming invasive procedure, Emergency department visit, Upcoming dental procedure, Missed doses, Extra doses, Change in medications, Change in diet/appetite, Hospital admission, Bruising, Other complaints   Comments:  INR verbal from lab, should be faxing results       Plan:  1. INR was therapeutic today at 3.0, possibly WNL still waiting confirmation of patient goal. Instructed Mr Brennan to  decrease dose to warfarin 10 mg daily except 7.5mg TuesThursSat until recheck.  2. Repeat INR on Tuesday 3/24  3. Verbal information provided over the phone to [Tito Brennan]. [Tito Guychaitanya] expresses understanding by teach back, RBV dosing instructions, and has no further questions at this time.    Need to verify his goal range with EMEKA Welch. Re-sent a message 3/10/20  Jordana Desir, LazaroD.  03/16/20   16:29    Updated note reflecting goal INR and referring provider.  I,  Leyla Saba, PharmD, have reviewed the note in full and agree with the assessment and plan.  03/17/20  09:04

## 2020-03-24 ENCOUNTER — ANTICOAGULATION VISIT (OUTPATIENT)
Dept: PHARMACY | Facility: HOSPITAL | Age: 60
End: 2020-03-24

## 2020-03-24 DIAGNOSIS — I48.0 PAROXYSMAL ATRIAL FIBRILLATION (HCC): ICD-10-CM

## 2020-03-24 LAB — INR PPP: 3.28

## 2020-03-24 RX ORDER — WARFARIN SODIUM 7.5 MG/1
TABLET ORAL
Qty: 30 TABLET | Refills: 0 | Status: SHIPPED | OUTPATIENT
Start: 2020-03-24 | End: 2020-04-29

## 2020-03-24 NOTE — PROGRESS NOTES
Anticoagulation Clinic - Remote Progress Note  [[facility]IT[facility] vs REMOTE LAB]  Frequency of Monitoring:     Indication:  Atrial fibrillation  Referring Provider:  Ursula  Initial Warfarin Start Date: 18-19 years ago (2001)  Goal INR: 2.0-3.0  Current Drug Interactions: pantaprozole (moderate)  CHADS-VASc: 3 (HF, HTN)  Bleed Risk: [HASBLED; HIGH/MODERATE/LOW + REASON; H/O BLEED]  Other: [PREVIOUS ANTICOAGULATION, ETC]    Diet: no leafy greens  Alcohol:  no  Tobacco: no  OTC Pain Medication: occationally tylenol for sinus HA    INR History:  Date 3/2/20 3/10 3/16 3/24          Total Weekly Dose 45 mg/5 days 70 mg 57.5mg 60mg 57.5mg         INR 1.84 4.02 3.00 3.28          Notes postop  1x hold  mis dose            Phone Interview:  Tablet Strength:  10 mg  Patient Contact Info: [phone]  Verbal Release Auth:   Lab Contact Info:  EDUARDO Gonzalez [phone]; fax [phone]    Patient Findings:    Positives:  Missed doses   Negatives:  Signs/symptoms of thrombosis, Signs/symptoms of bleeding, Laboratory test error suspected, Change in health, Change in alcohol use, Change in activity, Upcoming invasive procedure, Emergency department visit, Upcoming dental procedure, Extra doses, Change in medications, Change in diet/appetite, Hospital admission, Bruising, Other complaints   Comments:  INR verbal from lab   Pt took alt 7.5 and 10mg doses daily for 60mg/week rather than 62.5mg           Plan:  1. INR was supratherapeutic today at 3.28. Instructed Mr Brennan to  decrease dose to warfarin 7.5 mg daily except 10mg MonFri until recheck.  2. Repeat INR on Tuesday 3/31  3. Verbal information provided over the phone to [Tito Brennan]. [Tito Brennan] expresses understanding by teach back, RBV dosing instructions, and has no further questions at this time.  4. Sent  in rx for warfarin 7.5mg #30 to josr in [place] Desir PharmD.  03/24/20   15:52

## 2020-04-01 ENCOUNTER — ANTICOAGULATION VISIT (OUTPATIENT)
Dept: PHARMACY | Facility: HOSPITAL | Age: 60
End: 2020-04-01

## 2020-04-01 DIAGNOSIS — I48.0 PAROXYSMAL ATRIAL FIBRILLATION (HCC): ICD-10-CM

## 2020-04-01 LAB — INR PPP: 2.38

## 2020-04-01 NOTE — PROGRESS NOTES
Anticoagulation Clinic - Remote Progress Note  [[facility]IT[facility] vs REMOTE LAB]  Frequency of Monitoring:     Indication:  Atrial fibrillation  Referring Provider:  Ursula  Initial Warfarin Start Date: 18-19 years ago (2001)  Goal INR: 2.0-3.0  Current Drug Interactions: pantaprozole (moderate)  CHADS-VASc: 3 (HF, HTN)  Bleed Risk: [HASBLED; HIGH/MODERATE/LOW + REASON; H/O BLEED]  Other: [PREVIOUS ANTICOAGULATION, ETC]    Diet: no leafy greens 4/1/20  Alcohol:  no  Tobacco: no  OTC Pain Medication: occationally tylenol for sinus HA    INR History:  Date 3/2/20 3/10 3/16 3/24 4/1         Total Weekly Dose 45 mg/5 days 70 mg 57.5mg 60mg 57.5mg 60mg        INR 1.84 4.02 3.00 3.28 2.38         Notes postop  1x hold  mis dose            Phone Interview:  Tablet Strength:  10 mg  Patient Contact Info: [phone]  Verbal Release Auth:   Lab Contact Info:  EDUARDO Gonzalez [phone]; fax [phone]    Patient Findings:  Negatives:  Signs/symptoms of thrombosis, Signs/symptoms of bleeding, Laboratory test error suspected, Change in health, Change in alcohol use, Change in activity, Upcoming invasive procedure, Emergency department visit, Upcoming dental procedure, Missed doses, Extra doses, Change in medications, Change in diet/appetite, Hospital admission, Bruising, Other complaints     Plan:  1. INR is back WNL today although a significant decrease in the past week. Spoke with Leyla Saba, PharmD, and instructed Mr. Brennan to increase maintenance dose to warfarin 7.5mg daily except 10mg on SunWedFri until recheck. (60mg/week, 4.3% increase)  2. Repeat INR Friday, 4/10.  3. Verbal information provided over the phone to [Tito Brennan]. [Tito Brennan] expresses understanding by teach back, RBV dosing instructions, and has no further questions at this time.    Heather Light, Tommy  04/01/20   15:46     I, Dayana Gee, [facility], have reviewed the note in full and agree with the assessment and  plan.  04/01/20  16:12

## 2020-04-10 ENCOUNTER — ANTICOAGULATION VISIT (OUTPATIENT)
Dept: PHARMACY | Facility: HOSPITAL | Age: 60
End: 2020-04-10

## 2020-04-10 DIAGNOSIS — I48.0 PAROXYSMAL ATRIAL FIBRILLATION (HCC): ICD-10-CM

## 2020-04-10 LAB — INR PPP: 2.71

## 2020-04-10 NOTE — PROGRESS NOTES
Anticoagulation Clinic - Remote Progress Note  Remote Lab    Indication: paroxysmal afib   Referring Provider: Ursula  Initial Warfarin Start Date: 18-19 years ago (2001)  Goal INR: 2.0-3.0  Current Drug Interactions: pantaprozole (moderate)  CHADS-VASc: 3 (HF, HTN)  Bleed Risk: [HASBLED; HIGH/MODERATE/LOW + REASON; H/O BLEED]  Other: [PREVIOUS ANTICOAGULATION, ETC]    Diet: no leafy greens 4/1/20  Alcohol:  no  Tobacco: no  OTC Pain Medication: occationally tylenol for sinus HA    INR History:  Date 3/2/20 3/10 3/16 3/24 4/1 4/10         Total WeeklyDose 45 mg / 5 days 70mg 57.5mg 60mg 57.5mg 60mg         INR 1.84 4.02 3.0 3.28 2.38 2.71         Notes postop  1x hold misdose             Phone Interview:  Tablet Strength: 10mg tablet  Patient Contact Info: [phone]  Verbal Release Auth:   Lab Contact Info:  EDUARDO Gonzalez [phone]; fax [phone]    Negatives:  Signs/symptoms of thrombosis, Signs/symptoms of bleeding, Laboratory test error suspected, Change in health, Change in alcohol use, Change in activity, Upcoming invasive procedure, Emergency department visit, Upcoming dental procedure, Missed doses, Extra doses, Change in medications, Change in diet/appetite, Hospital admission, Bruising, Other complaints       Plan:  1. INR is therapeutic today at 2.71. After consulting with Jordana Desir PharmD, and instructed Mr. Brennan to continue recently increased dose of warfarin 7.5mg daily except 10mg on SunWedFri until recheck. (60mg/week)  2. Repeat INR in 2 weeks, 4/24  3. Verbal information provided over the phone. Tito Brennan RBV dosing instructions, expresses understanding by teach back, and has no further questions at this time.    Lazaro AngelaD  Pharmacy Resident   4/10/2020  12:28

## 2020-04-24 ENCOUNTER — ANTICOAGULATION VISIT (OUTPATIENT)
Dept: PHARMACY | Facility: HOSPITAL | Age: 60
End: 2020-04-24

## 2020-04-24 DIAGNOSIS — I48.0 PAROXYSMAL ATRIAL FIBRILLATION (HCC): ICD-10-CM

## 2020-04-24 LAB — INR PPP: 2.26

## 2020-04-24 NOTE — PROGRESS NOTES
Anticoagulation Clinic - Remote Progress Note  Remote Lab    Indication: paroxysmal afib   Referring Provider: Ursula  Initial Warfarin Start Date: 18-19 years ago (2001)  Goal INR: 2.0-3.0  Current Drug Interactions: pantaprozole (moderate)  CHADS-VASc: 3 (HF, HTN)  Bleed Risk: [HASBLED; HIGH/MODERATE/LOW + REASON; H/O BLEED]  Other: [PREVIOUS ANTICOAGULATION, ETC]    Diet: no leafy greens 4/1/20  Alcohol:  no  Tobacco: no  OTC Pain Medication: occationally tylenol for sinus HA    INR History:  Date 3/2/20 3/10 3/16 3/24 4/1 4/10 4/24        Total WeeklyDose 45 mg / 5 days 70mg 57.5mg 60mg 57.5mg 60mg 60mg        INR 1.84 4.02 3.0 3.28 2.38 2.71 2.26        Notes postop  1x hold misdose             Phone Interview:  Tablet Strength: 10mg tablet  Patient Contact Info: [phone]  Verbal Release Auth:   Lab Contact Info:  EDUARDO Gonzalez [phone]; fax [phone]    Negatives:  Signs/symptoms of thrombosis, Signs/symptoms of bleeding, Laboratory test error suspected, Change in health, Change in alcohol use, Change in activity, Upcoming invasive procedure, Emergency department visit, Upcoming dental procedure, Missed doses, Extra doses, Change in medications, Change in diet/appetite, Hospital admission, Bruising, Other complaints     Plan:  1. INR is therapeutic today at 2.26.  instructed Mr. Brennan to continue dose of warfarin 7.5mg daily except 10mg on SunWedFri until recheck. (60mg/week)  2. Repeat INR in 3 weeks, 5/15.  3. Verbal information provided over the phone. Tito Brennan RBV dosing instructions, expresses understanding by teach back, and has no further questions at this time.    Leyla Saba, PharmD  4/24/2020  12:51

## 2020-04-29 RX ORDER — WARFARIN SODIUM 7.5 MG/1
TABLET ORAL
Qty: 30 TABLET | Refills: 0 | Status: SHIPPED | OUTPATIENT
Start: 2020-04-29 | End: 2020-05-18 | Stop reason: SDUPTHER

## 2020-05-15 ENCOUNTER — ANTICOAGULATION VISIT (OUTPATIENT)
Dept: PHARMACY | Facility: HOSPITAL | Age: 60
End: 2020-05-15

## 2020-05-15 DIAGNOSIS — I48.0 PAROXYSMAL ATRIAL FIBRILLATION (HCC): ICD-10-CM

## 2020-05-15 LAB — INR PPP: 2.03

## 2020-05-18 RX ORDER — WARFARIN SODIUM 7.5 MG/1
TABLET ORAL
Qty: 30 TABLET | Refills: 0 | Status: SHIPPED | OUTPATIENT
Start: 2020-05-18 | End: 2020-07-10 | Stop reason: SDUPTHER

## 2020-05-18 NOTE — PROGRESS NOTES
Anticoagulation Clinic - Remote Progress Note  Remote Lab    Indication: paroxysmal afib   Referring Provider: Ursula  Initial Warfarin Start Date: 18-19 years ago (2001)  Goal INR: 2.0-3.0  Current Drug Interactions: pantaprozole (moderate)  CHADS-VASc: 3 (HF, HTN)  Bleed Risk: [HASBLED; HIGH/MODERATE/LOW + REASON; H/O BLEED]  Other: [PREVIOUS ANTICOAGULATION, ETC]    Diet: no leafy greens 4/1/20  Alcohol:  no  Tobacco: no  OTC Pain Medication: occationally tylenol for sinus HA    INR History:  Date 3/2/20 3/10 3/16 3/24 4/1 4/10 4/24 5/15       Total WeeklyDose 45 mg / 5 days 70mg 57.5mg 60mg 57.5mg 60mg 60mg 60mg       INR 1.84 4.02 3.0 3.28 2.38 2.71 2.26 2.03       Notes postop  1x hold misdose             Phone Interview:  Tablet Strength: 7.5mg and 10mg tablet  Patient Contact Info: [phone] (mobile); [phone] (home)  Verbal Release Auth:   Lab Contact Info:  EDUARDO Gonzalez [phone]; fax [phone]    Patient Findings   Negatives:  Signs/symptoms of thrombosis, Signs/symptoms of bleeding, Laboratory test error suspected, Change in health, Change in alcohol use, Change in activity, Upcoming invasive procedure, Emergency department visit, Upcoming dental procedure, Missed doses, Extra doses, Change in medications, Change in diet/appetite, Hospital admission, Bruising, Other complaints   Comments:  Patient denies all findings.     Plan:  1. INR is therapeutic today at 2.03. Instructed Mr. Brennan to continue dose of warfarin 7.5mg daily except 10mg on SunWedFri until recheck. (60mg/week)  2. Repeat INR in 4 weeks, 6/11  3. Verbal information provided over the phone. Tito Brennan RBV dosing instructions, expresses understanding by teach back, and has no further questions at this time.  4. Patient request refills on his warfarin 7.5mg tablet to be called into [facility] in [place], [place].     Andrew Iqbal CPhT  05/18/20  9:41 AM     Refills sent- spoke with representative at pharmacy.  I,  Leyla Saba, PharmD, have reviewed the note in full and agree with the assessment and plan.  05/18/20  16:20

## 2020-06-03 ENCOUNTER — OFFICE VISIT (OUTPATIENT)
Dept: CARDIOLOGY | Facility: CLINIC | Age: 60
End: 2020-06-03

## 2020-06-03 VITALS
OXYGEN SATURATION: 98 % | BODY MASS INDEX: 38.19 KG/M2 | WEIGHT: 282 LBS | SYSTOLIC BLOOD PRESSURE: 120 MMHG | HEART RATE: 92 BPM | DIASTOLIC BLOOD PRESSURE: 78 MMHG | HEIGHT: 72 IN

## 2020-06-03 DIAGNOSIS — I42.8 NICM (NONISCHEMIC CARDIOMYOPATHY) (HCC): ICD-10-CM

## 2020-06-03 DIAGNOSIS — I10 ESSENTIAL HYPERTENSION: ICD-10-CM

## 2020-06-03 DIAGNOSIS — I48.19 ATRIAL FIBRILLATION, PERSISTENT (HCC): Primary | ICD-10-CM

## 2020-06-03 DIAGNOSIS — I42.0 CARDIOMYOPATHY, DILATED (HCC): ICD-10-CM

## 2020-06-03 PROBLEM — I48.0 PAROXYSMAL ATRIAL FIBRILLATION (HCC): Status: RESOLVED | Noted: 2020-02-26 | Resolved: 2020-06-03

## 2020-06-03 PROCEDURE — 99214 OFFICE O/P EST MOD 30 MIN: CPT | Performed by: INTERNAL MEDICINE

## 2020-06-03 PROCEDURE — 93000 ELECTROCARDIOGRAM COMPLETE: CPT | Performed by: INTERNAL MEDICINE

## 2020-06-03 RX ORDER — METOPROLOL SUCCINATE 100 MG/1
200 TABLET, EXTENDED RELEASE ORAL DAILY
Qty: 60 TABLET | Refills: 6 | Status: SHIPPED | OUTPATIENT
Start: 2020-06-03 | End: 2021-01-12 | Stop reason: SDUPTHER

## 2020-06-03 RX ORDER — PANTOPRAZOLE SODIUM 40 MG/1
40 TABLET, DELAYED RELEASE ORAL DAILY
Qty: 30 TABLET | Refills: 6 | Status: SHIPPED | OUTPATIENT
Start: 2020-06-03

## 2020-06-03 NOTE — PROGRESS NOTES
Tito Brennan  1960    There is no work phone number on file.      06/03/2020    [facility] CARDIOLOGY     Gabe Mims MD  [address] [address]    Chief Complaint   Patient presents with   • Cardiomyopathy       Problem List:   1. Persistent atrial fibrillation  a. CHADSVASc = 3, on warfarin  b. Diagnosed ~2000 at time of colonoscopy, asymptomatic  c. Admitted for Tikosyn initiation, 06/2019, failed ECV x 3, ERAF after ICV, Tikosyn discontinued  2. NICM  a. Echocardiogram 12/28/2018: EF 40%, LA size 5.2 cm in atrial fibrillation during exam  b. Echo 10/10/2019 LVEF 40%, Mild MR  3. Coronary artery disease  a. Nuclear stress test 2/13/2019: Moderate size, moderate intensity anteroseptal wall reversible defect suggestive of ischemia, EF 37%  b. Left heart catheterization 3/6/2019: Nonobstructive, noncritical coronary artery disease to the distal RCA, 20-30%, EF 35-40%  c. Echo 10/10/2019 LVEF 40%, Mild MR  4. Diabetes mellitus type II  5. Hypertension    Allergies  No Known Allergies    Current Medications    Current Outpatient Medications:   •  cetirizine (zyrTEC) 10 MG tablet, Take 10 mg by mouth Daily., Disp: , Rfl:   •  Magnesium Oxide 400 (240 Mg) MG tablet, Take 1 tablet by mouth Daily., Disp: 30 tablet, Rfl: 6  •  metFORMIN (GLUCOPHAGE) 1000 MG tablet, Take 1,000 mg by mouth 2 (Two) Times a Day With Meals., Disp: , Rfl:   •  metoprolol succinate XL (TOPROL-XL) 100 MG 24 hr tablet, Take 2 tablets by mouth Daily., Disp: 60 tablet, Rfl: 6  •  pantoprazole (PROTONIX) 40 MG EC tablet, Take 1 tablet by mouth Daily., Disp: 30 tablet, Rfl: 6  •  sacubitril-valsartan (ENTRESTO) 24-26 MG tablet, Take 1 tablet by mouth Every 12 (Twelve) Hours., Disp: 60 tablet, Rfl: 6  •  warfarin (COUMADIN) 10 MG tablet, Take 1 tablet by mouth Daily., Disp: 30 tablet, Rfl: 6  •  warfarin (COUMADIN) 7.5 MG tablet, Take one tablet by mouth daily or as directed by the anticoagulation clinic,  "Disp: 30 tablet, Rfl: 0    History of Present Illness   HPI    Pt presents for follow up of NICM, persistent atrial fibrillation. Since we last saw the pt, pt denies any SOB, CP, LH, or dizziness.  Notes mild fatigue daily, usually worse at the end of the day after he has been working.  Still working daily.  Denies any hospitalizations, ER visits, bleeding, or TIA/CVA symptoms. Overall feels well.  Reports INR's levels have been stable.  HR's running 60's-70's at home.     ROS:  General:  + fatigue, no weight gain or loss  Cardiovascular:  Denies CP, PND, syncope, near syncope, edema or palpitations.  Pulmonary:  Denies GOODWIN, cough, or wheezing      Vitals:    06/03/20 1027   BP: 120/78   BP Location: Left arm   Patient Position: Sitting   Pulse: 92   SpO2: 98%   Weight: 128 kg (282 lb)   Height: 182.9 cm (72\")     PE:  General: NAD  Neck: no JVD, no carotid bruits, no TM  Heart Irreg irreg, NL S1, S2, no rubs, murmurs  Lungs: CTA, no wheezes, rhonchi, or rales  Abd: soft, non-tender, NL BS  Ext: No musculoskeletal deformities, no edema, cyanosis, or clubbing  Psych: normal mood and affect    Diagnostic Data:        ECG 12 Lead  Date/Time: 6/3/2020 11:00 AM  Performed by: Adam Vergara MD  Authorized by: Adam Vergara MD   Comparison: compared with previous ECG from 7/30/2019  Similar to previous ECG  Rhythm: atrial fibrillation  BPM: 90              1. Atrial fibrillation, persistent    2. NICM (nonischemic cardiomyopathy) (CMS/HCC)    3. Essential hypertension          Plan:  1) Persistent atrial fibrillation:  - Persistent in nature.  Failed Tikosyn in the past.  Asymptomatic.  Rates slightly up today but reports readings usually in the 60's at home.  Will call for readings persistently >90 bpm.  - Continue present medications.   2) Anticoagulation:  - CHADSVASc = 3 , on warfarin  3) NICM:  - EF improved to 40% per echo 10/2019.  NYHA class II.  Increase Entresto 49/51 mg po bidand continueToprol XL.  " Needs cardiac MRI.  4) HTN:  - Well controlled on Entresto  - Wt loss, exercise, salt reduction    F/up in 6 months    Scribed for Adam Vergara MD by PAOLA Arceo. 6/3/2020  11:01     I, Adam Vergara MD, personally performed the services described in this documentation as scribed by the above named individual in my presence, and it is both accurate and complete.  6/3/2020  11:01

## 2020-06-11 ENCOUNTER — ANTICOAGULATION VISIT (OUTPATIENT)
Dept: PHARMACY | Facility: HOSPITAL | Age: 60
End: 2020-06-11

## 2020-06-11 LAB — INR PPP: 1.96

## 2020-06-11 NOTE — PROGRESS NOTES
Anticoagulation Clinic - Remote Progress Note  Remote Lab    Indication: paroxysmal afib   Referring Provider: Ursula  Initial Warfarin Start Date: 18-19 years ago (2001)  Goal INR: 2.0-3.0  Current Drug Interactions: pantaprozole (moderate)  CHADS-VASc: 3 (HF, HTN)  Bleed Risk: [HASBLED; HIGH/MODERATE/LOW + REASON; H/O BLEED]  Other: [PREVIOUS ANTICOAGULATION, ETC]    Diet: no leafy greens 4/1/20  Alcohol:  no  Tobacco: no  OTC Pain Medication: occationally tylenol for sinus HA    INR History:  Date 3/2/20 3/10 3/16 3/24 4/1 4/10 4/24 5/15 6/11      Total WeeklyDose 45 mg / 5 days 70mg 57.5mg 60mg 57.5mg 60mg 60mg 60mg 60mg 62.5mg     INR 1.84 4.02 3.0 3.28 2.38 2.71 2.26 2.03 1.96      Notes postop  1x hold misdose             Phone Interview:  Tablet Strength: 7.5mg and 10mg tablet  Patient Contact Info: [phone] (mobile); [phone] (home)  Verbal Release Auth:   Lab Contact Info:  EDUARDO Gonzalez [phone]; fax [phone]    Patient Findings   Negatives:  Signs/symptoms of thrombosis, Signs/symptoms of bleeding, Laboratory test error suspected, Change in health, Change in alcohol use, Change in activity, Upcoming invasive procedure, Emergency department visit, Upcoming dental procedure, Missed doses, Extra doses, Change in medications, Change in diet/appetite, Hospital admission, Bruising, Other complaints       Plan:  1. INR is slightly subtherapeutic at 1.96. Instructed Mr. Brennan to adjust his dose to warfarin 10 mg Sun, Tue, Thur, Sat and 7.5mg on Mon, Wed, Fri. This will be a weekly dose of 62.5 mg (4.2% increase) until next recheck. Had Mr. Brennan read back and verify the new regimen.  2. Repeat INR in 2 weeks on 6/25 to assess current regimen.  3. Verbal information provided over the phone. Tito Brennan RBV dosing instructions, expresses understanding by teach back, and has no further questions at this time.    Thanks  Steffen El, PharmD  PGY1 Resident  6/11/2020  10:08

## 2020-06-25 ENCOUNTER — ANTICOAGULATION VISIT (OUTPATIENT)
Dept: PHARMACY | Facility: HOSPITAL | Age: 60
End: 2020-06-25

## 2020-06-25 LAB — INR PPP: 2.06

## 2020-06-25 NOTE — PROGRESS NOTES
Anticoagulation Clinic - Remote Progress Note  Remote Lab    Indication: paroxysmal afib   Referring Provider: Ursula  Initial Warfarin Start Date: 18-19 years ago (2001)  Goal INR: 2.0-3.0  Current Drug Interactions: pantaprozole (moderate)  CHADS-VASc: 3 (HF, HTN)  Bleed Risk: [HASBLED; HIGH/MODERATE/LOW + REASON; H/O BLEED]  Other: [PREVIOUS ANTICOAGULATION, ETC]    Diet: no leafy greens 6/25/20  Alcohol:  no  Tobacco: no  OTC Pain Medication: occationally tylenol for sinus HA    INR History:  Date 3/2/20 3/10 3/16 3/24 4/1 4/10 4/24 5/15 6/11 6/25     Total WeeklyDose 45 mg / 5 days 70mg 57.5mg 60mg 57.5mg 60mg 60mg 60mg 60mg 62.5mg     INR 1.84 4.02 3.0 3.28 2.38 2.71 2.26 2.03 1.96 2.06     Notes postop  1x hold misdose             Phone Interview:  Tablet Strength: 7.5mg and 10mg tablet  Patient Contact Info: [phone] (mobile); [phone] (home)  Verbal Release Auth:   Lab Contact Info:  EDUARDO Gonzalez [phone]; fax [phone]    Patient Findings:  Negatives:  Signs/symptoms of thrombosis, Signs/symptoms of bleeding, Laboratory test error suspected, Change in health, Change in alcohol use, Change in activity, Upcoming invasive procedure, Emergency department visit, Upcoming dental procedure, Missed doses, Extra doses, Change in medications, Change in diet/appetite, Hospital admission, Bruising, Other complaints     Plan:  1. INR is therapeutic today at 2.06,  although at LLN. Instructed Mr. Brennan to continue weekly dose of 62.5 mg: warfarin 10 mg oral daily except  7.5mg on MonWedFri until next recheck. Mr. Brennan read back and verified the new regimen.  2. Repeat INR in 2 weeks on 7/9 to assess current regimen.  3. Verbal information provided over the phone. Titolisette Brennan RBV dosing instructions, expresses understanding by teach back, and has no further questions at this time.    Heather Light CPhT  6/25/2020  10:19     I, Bing Flynn, PharmD, have reviewed the note in full and agree with  the assessment and plan.  06/25/20  13:01

## 2020-06-30 ENCOUNTER — HOSPITAL ENCOUNTER (OUTPATIENT)
Dept: MRI IMAGING | Facility: HOSPITAL | Age: 60
Discharge: HOME | End: 2020-06-30

## 2020-07-09 ENCOUNTER — ANTICOAGULATION VISIT (OUTPATIENT)
Dept: PHARMACY | Facility: HOSPITAL | Age: 60
End: 2020-07-09

## 2020-07-09 LAB — INR PPP: 2.46

## 2020-07-10 DIAGNOSIS — I48.19 ATRIAL FIBRILLATION, PERSISTENT (HCC): Primary | ICD-10-CM

## 2020-07-10 RX ORDER — WARFARIN SODIUM 10 MG/1
TABLET ORAL
Qty: 30 TABLET | Refills: 3 | Status: CANCELLED | OUTPATIENT
Start: 2020-07-10

## 2020-07-10 RX ORDER — WARFARIN SODIUM 10 MG/1
10 TABLET ORAL DAILY
Qty: 90 TABLET | Refills: 0 | Status: SHIPPED | OUTPATIENT
Start: 2020-07-10 | End: 2021-01-05 | Stop reason: SDUPTHER

## 2020-07-10 RX ORDER — WARFARIN SODIUM 7.5 MG/1
TABLET ORAL
Qty: 90 TABLET | Refills: 0 | Status: SHIPPED | OUTPATIENT
Start: 2020-07-10 | End: 2022-02-18 | Stop reason: SDUPTHER

## 2020-07-10 NOTE — PROGRESS NOTES
Anticoagulation Clinic - Remote Progress Note  Remote Lab    Indication: paroxysmal afib   Referring Provider: Ursula  Initial Warfarin Start Date: 18-19 years ago (2001)  Goal INR: 2.0-3.0  Current Drug Interactions: pantaprozole (moderate)  CHADS-VASc: 3 (HF, HTN)  Bleed Risk: [HASBLED; HIGH/MODERATE/LOW + REASON; H/O BLEED]  Other: [PREVIOUS ANTICOAGULATION, ETC]    Diet: no leafy greens 6/25/20  Alcohol:  no  Tobacco: no  OTC Pain Medication: occationally tylenol for sinus HA    INR History:  Date 3/2/20 3/10 3/16 3/24 4/1 4/10 4/24 5/15 6/11 6/25 7/9    Total WeeklyDose 45 mg / 5 days 70mg 57.5mg 60mg 57.5mg 60mg 60mg 60mg 60mg 62.5mg 62.5mg 62.5mg   INR 1.84 4.02 3.0 3.28 2.38 2.71 2.26 2.03 1.96 2.06 2.46    Notes postop  1x hold misdose             Phone Interview:  Tablet Strength: 7.5mg and 10mg tablet  Patient Contact Info: [phone] (mobile); [phone] (home)  Verbal Release Auth:   Lab Contact Info:  EDUARDO Gonzalez [phone]; fax [phone]    Patient Findings   Negatives:  Signs/symptoms of thrombosis, Signs/symptoms of bleeding, Laboratory test error suspected, Change in health, Change in alcohol use, Change in activity, Upcoming invasive procedure, Emergency department visit, Upcoming dental procedure, Missed doses, Extra doses, Change in medications, Change in diet/appetite, Hospital admission, Bruising, Other complaints   Comments:  Patient denies all findings     Plan:  1. INR was therapeutic yesterday at 2.46. Instructed Mr. Brennan to continue warfarin 10mg oral daily except 7.5mg on MonWedFri until next recheck  2. Repeat INR in 3 weeks, 7/30.  3. Verbal information provided over the phone. Tito Brennan RBV dosing instructions, expresses understanding by teach back, and has no further questions at this time.  4. Patient requests refills on both his warfarin 7.5mg and 10mg tablets to be called into [facility] in [place], [place].      Andrew Iqbal, Tommy  7/10/2020  08:49    Rx  sent  I, Jordana Desir, [facility], have reviewed the note in full and agree with the assessment and plan.  07/10/20  16:43

## 2020-07-30 ENCOUNTER — TELEPHONE (OUTPATIENT)
Dept: PHARMACY | Facility: HOSPITAL | Age: 60
End: 2020-07-30

## 2020-07-30 ENCOUNTER — ANTICOAGULATION VISIT (OUTPATIENT)
Dept: PHARMACY | Facility: HOSPITAL | Age: 60
End: 2020-07-30

## 2020-07-30 LAB — INR PPP: 2.2

## 2020-07-30 NOTE — PROGRESS NOTES
Anticoagulation Clinic - Remote Progress Note  Remote Lab    Indication: paroxysmal afib   Referring Provider: Ursula  Initial Warfarin Start Date: 18-19 years ago (2001)  Goal INR: 2.0-3.0  Current Drug Interactions: pantaprozole (moderate)  CHADS-VASc: 3 (HF, HTN)  Bleed Risk: [HASBLED; HIGH/MODERATE/LOW + REASON; H/O BLEED]  Other: [PREVIOUS ANTICOAGULATION, ETC]    Diet: no leafy greens 7/30/20  Alcohol:  no  Tobacco: no  OTC Pain Medication: occationally tylenol for sinus HA    INR History:  Date 3/2/20 3/10 3/16 3/24 4/1 4/10 4/24 5/15 6/11 6/25 7/9 7/30    Total WeeklyDose 45 mg / 5 days 70mg 57.5mg 60mg 57.5mg 60mg 60mg 60mg 60mg 62.5mg 62.5mg 62.5mg    INR 1.84 4.02 3.0 3.28 2.38 2.71 2.26 2.03 1.96 2.06 2.46 2.20    Notes postop  1x hold misdose              Phone Interview:  Tablet Strength: 7.5mg and 10mg tablet  Patient Contact Info: [phone] (mobile); [phone] (home)  Verbal Release Auth:   Lab Contact Info:  EDUARDO Gonzalez [phone]; fax [phone]    Patient Findings:  Negatives:  Signs/symptoms of thrombosis, Signs/symptoms of bleeding, Laboratory test error suspected, Change in health, Change in alcohol use, Change in activity, Upcoming invasive procedure, Emergency department visit, Upcoming dental procedure, Missed doses, Extra doses, Change in medications, Change in diet/appetite, Hospital admission, Bruising, Other complaints     Plan:  1. INR is therapeutic today at 2.2. Instructed Mr. Brennan to continue maintenance dose of warfarin 10mg oral daily except 7.5mg on MonWedFri until next recheck  2. Repeat INR in 4 weeks on 8/27.  3. Verbal information provided over the phone. Tito Brennan RBV dosing instructions, expresses understanding by teach back, and has no further questions at this time.    Heather Light, Tommy  7/30/2020  14:23    I, Bing Flynn, PharmD, have reviewed the note in full and agree with the assessment and plan.  07/30/20  14:44

## 2020-07-30 NOTE — TELEPHONE ENCOUNTER
Called to let us know to call this phone number when his INR comes in: [phone]. He just left the lab ~1 hour ago.

## 2020-08-06 RX ORDER — WARFARIN SODIUM 7.5 MG/1
TABLET ORAL
Qty: 30 TABLET | Refills: 3 | Status: SHIPPED | OUTPATIENT
Start: 2020-08-06 | End: 2020-09-24 | Stop reason: SDUPTHER

## 2020-08-07 ENCOUNTER — TELEPHONE (OUTPATIENT)
Dept: CARDIOLOGY | Facility: CLINIC | Age: 60
End: 2020-08-07

## 2020-08-07 NOTE — TELEPHONE ENCOUNTER
Patient notified. Patient also wanted to mention that his SBP has been < 100 and HR <60 at times so he has taken 100mg toprol those days. I encouraged him to continue to monitor and log BP, HR and doses so we can make a medication adjustment if necessary.

## 2020-08-07 NOTE — TELEPHONE ENCOUNTER
----- Message from Adam Vergara MD sent at 8/6/2020 11:41 AM EDT -----  I have been unable to reach the patient by phone to discuss his cardiac MRI.  His LVEF on the cardiac MRI is 41%.  This is stable.  Continue current medical therapy as we discussed.

## 2020-08-27 ENCOUNTER — ANTICOAGULATION VISIT (OUTPATIENT)
Dept: PHARMACY | Facility: HOSPITAL | Age: 60
End: 2020-08-27

## 2020-08-27 LAB — INR PPP: 2.38

## 2020-08-27 NOTE — PROGRESS NOTES
Anticoagulation Clinic - Remote Progress Note  Remote Lab    Indication: paroxysmal afib   Referring Provider: Ursula  Initial Warfarin Start Date: 18-19 years ago (2001)  Goal INR: 2.0-3.0  Current Drug Interactions: pantaprozole (moderate)  CHADS-VASc: 3 (HF, HTN)  Bleed Risk: [HASBLED; HIGH/MODERATE/LOW + REASON; H/O BLEED]  Other: [PREVIOUS ANTICOAGULATION, ETC]    Diet: no leafy greens 8/27/20  Alcohol:  no  Tobacco: no  OTC Pain Medication: occationally tylenol for sinus HA    INR History:  Date 3/2/20 3/10 3/16 3/24 4/1 4/10 4/24 5/15 6/11 6/25 7/9 7/30 8/27   Total WeeklyDose 45 mg / 5 days 70mg 57.5mg 60mg 57.5mg 60mg 60mg 60mg 60mg 62.5mg 62.5mg 62.5mg 62.5 mg   INR 1.84 4.02 3.0 3.28 2.38 2.71 2.26 2.03 1.96 2.06 2.46 2.20 2.38   Notes postop  1x hold misdose         rec'd 8/28   * takes warfarin in AM    Phone Interview:  Tablet Strength: 7.5mg and 10mg tablet  Patient Contact Info: [phone] (mobile); [phone] (home)  Verbal Release Auth:   Lab Contact Info:  EDUARDO Gonzalez [phone]; fax [phone]    Patient Findings:  Negatives:  Signs/symptoms of thrombosis, Signs/symptoms of bleeding, Laboratory test error suspected, Change in health, Change in alcohol use, Change in activity, Upcoming invasive procedure, Emergency department visit, Upcoming dental procedure, Missed doses, Extra doses, Change in medications, Change in diet/appetite, Hospital admission, Bruising, Other complaints   Comments:  Confirmed dosing with Mr. Brennan. He denies any changes.     Plan:  1. INR was therapeutic yesterday at 2.38. Instructed Mr. Brennan to continue warfarin 10mg oral daily except 7.5mg on MonWedFri until next recheck.  2. Repeat INR in 4 weeks on 9/24.  3. Verbal information provided over the phone. Tito Brennan RBV dosing instructions, expresses understanding by teach back, and has no further questions at this time.    Heather Light, Tommy  8/27/2020  10:49    I, Bing Flynn, LazaroD, have  reviewed the note in full and agree with the assessment and plan.  08/27/20  16:32

## 2020-09-01 ENCOUNTER — TELEPHONE (OUTPATIENT)
Dept: CARDIOLOGY | Facility: CLINIC | Age: 60
End: 2020-09-01

## 2020-09-01 NOTE — TELEPHONE ENCOUNTER
Pt states since his entresto was increased to 49/51mg 1 BID his BP has been running low. 99/72, 88/60's. He also is only taking metoprolol XL 1 daily not BID.

## 2020-09-02 RX ORDER — LANOLIN ALCOHOL/MO/W.PET/CERES
400 CREAM (GRAM) TOPICAL DAILY
Qty: 30 TABLET | Refills: 11 | Status: SHIPPED | OUTPATIENT
Start: 2020-09-02

## 2020-09-02 NOTE — TELEPHONE ENCOUNTER
Called pt to ensure that he received his vm. He is aware and refill of Mg and new rx for entresto escribed to pharmacy

## 2020-09-24 ENCOUNTER — ANTICOAGULATION VISIT (OUTPATIENT)
Dept: PHARMACY | Facility: HOSPITAL | Age: 60
End: 2020-09-24

## 2020-09-24 LAB — INR PPP: 2.27

## 2020-09-24 NOTE — PROGRESS NOTES
Anticoagulation Clinic - Remote Progress Note  Remote Lab    Indication: paroxysmal afib   Referring Provider: Ursula  Initial Warfarin Start Date: 18-19 years ago (2001)  Goal INR: 2.0-3.0  Current Drug Interactions: pantaprozole (moderate)  CHADS-VASc: 3 (HF, HTN)  Bleed Risk: [HASBLED; HIGH/MODERATE/LOW + REASON; H/O BLEED]  Other: [PREVIOUS ANTICOAGULATION, ETC]    Diet: no leafy greens 8/27/20  Alcohol:  no  Tobacco: no  OTC Pain Medication: occationally tylenol for sinus HA    INR History:  Date 3/2/20 3/10 3/16 3/24 4/1 4/10 4/24 5/15 6/11 6/25 7/9 7/30 8/27   Total WeeklyDose 45 mg / 5 days 70mg 57.5mg 60mg 57.5mg 60mg 60mg 60mg 60mg 62.5mg 62.5mg 62.5mg 62.5mg   INR 1.84 4.02 3.0 3.28 2.38 2.71 2.26 2.03 1.96 2.06 2.46 2.20 2.38   Notes postop  1x hold misdose         recv'd 8/28     Date 9/24               Total WeeklyDose 62.5mg               INR 2.27               Notes                  * takes warfarin in AM    Phone Interview:  Tablet Strength: 7.5mg and 10mg tablet  Patient Contact Info: [phone] (mobile); [phone] (home)  Verbal Release Auth:   Lab Contact Info: EDUARDO Gonzalez [phone]; fax [phone]    Patient Findings  Positives:  Change in medications   Negatives:  Signs/symptoms of thrombosis, Signs/symptoms of bleeding, Laboratory test error suspected, Change in health, Change in alcohol use, Change in activity, Upcoming invasive procedure, Emergency department visit, Upcoming dental procedure, Missed doses, Extra doses, Change in diet/appetite, Hospital admission, Bruising, Other complaints   Comments:  Dr. Vergara decreased patient Entresto to 24-26mg on 9/1. Otherwise denies findings.     Plan:  1. INR is therapeutic today at 2.27. INR received as verbal from lab. Instructed Mr. Brennan to continue warfarin 10mg oral daily except 7.5mg on MonWedFri until next recheck.  2. Repeat INR in 4 weeks, 10/22  3. Verbal information provided over the phone. Tito Brennan RBV dosing  instructions, expresses understanding by teach back, and has no further questions at this time.    Andrew Iqbal, SHERWIN  9/24/2020  13:41 EDT    I, Evelyn Bennett, have reviewed the note in full and agree with the assessment and plan.  09/24/20  16:32 EDT

## 2020-10-22 ENCOUNTER — ANTICOAGULATION VISIT (OUTPATIENT)
Dept: PHARMACY | Facility: HOSPITAL | Age: 60
End: 2020-10-22

## 2020-10-22 LAB — INR PPP: 2.23

## 2020-10-22 NOTE — PROGRESS NOTES
Anticoagulation Clinic - Remote Progress Note  Remote Lab    Indication: paroxysmal afib   Referring Provider: Ursula  Initial Warfarin Start Date: 18-19 years ago (2001)  Goal INR: 2.0-3.0  Current Drug Interactions: pantaprozole (moderate)  CHADS-VASc: 3 (HF, HTN)  Bleed Risk: [HASBLED; HIGH/MODERATE/LOW + REASON; H/O BLEED]  Other: [PREVIOUS ANTICOAGULATION, ETC]    Diet: no leafy greens 8/27/20  Alcohol:  no  Tobacco: no  OTC Pain Medication: occationally tylenol for sinus HA    INR History:  Date 3/2/20 3/10 3/16 3/24 4/1 4/10 4/24 5/15 6/11 6/25 7/9 7/30 8/27   Total WeeklyDose 45 mg / 5 days 70mg 57.5mg 60mg 57.5mg 60mg 60mg 60mg 60mg 62.5mg 62.5mg 62.5mg 62.5mg   INR 1.84 4.02 3.0 3.28 2.38 2.71 2.26 2.03 1.96 2.06 2.46 2.20 2.38   Notes postop  1x hold misdose         recv'd 8/28     Date 9/24 10/22              Total WeeklyDose 62.5mg 62.5mg              INR 2.27 2.23              Notes  Bactrim; clinda                * takes warfarin in AM    Phone Interview:  Tablet Strength: 7.5mg and 10mg tablet  Patient Contact Info: [phone] (mobile); [phone] (home)  Verbal Release Auth:   Lab Contact Info: EDUARDO Gonzalez [phone]; fax [phone]    Patient Findings  Positives:  Change in medications   Negatives:  Signs/symptoms of thrombosis, Signs/symptoms of bleeding, Laboratory test error suspected, Change in health, Change in alcohol use, Change in activity, Upcoming invasive procedure, Emergency department visit, Upcoming dental procedure, Missed doses, Extra doses, Change in diet/appetite, Hospital admission, Bruising, Other complaints   Comments:  INR verbal from lab, awaiting HC   Reported he just completed a course of abx for spot on elbow that got infected, clindamycin 300mg q6h x7 days (last dose today ) and sulfamethaoxazle- TMP DS bid x 7 days, last dose yesterday. Surprisingly no increased INR with bactrim DDI       Plan:  1. INR is therapeutic today at 2.27 despite concurrent bactrim  use. INR received as verbal from lab. Instructed Mr. Brennan to continue warfarin 10mg oral daily except 7.5mg on MonWedFri until next recheck.  2. Repeat INR next week to ensure no delayed interaction with bactrim, then can resume q4w  3. Verbal information provided over the phone. Tito Brennan RBV dosing instructions, expresses understanding by teach back, and has no further questions at this time.      Jordana Desir, LazaroD.  10/22/20   15:29 EDT

## 2020-10-29 ENCOUNTER — ANTICOAGULATION VISIT (OUTPATIENT)
Dept: PHARMACY | Facility: HOSPITAL | Age: 60
End: 2020-10-29

## 2020-10-29 LAB — INR PPP: 2.16

## 2020-10-29 NOTE — PROGRESS NOTES
Anticoagulation Clinic - Remote Progress Note  Remote Lab    Indication: paroxysmal afib   Referring Provider: Ursula  Initial Warfarin Start Date: 18-19 years ago (2001)  Goal INR: 2.0-3.0  Current Drug Interactions: pantaprozole (moderate)  CHADS-VASc: 3 (HF, HTN)  Bleed Risk: [HASBLED; HIGH/MODERATE/LOW + REASON; H/O BLEED]  Other: [PREVIOUS ANTICOAGULATION, ETC]    Diet: no leafy greens 8/27/20  Alcohol:  no  Tobacco: no  OTC Pain Medication: occationally tylenol for sinus HA    INR History:  Date 3/2/20 3/10 3/16 3/24 4/1 4/10 4/24 5/15 6/11 6/25 7/9 7/30 8/27   Total WeeklyDose 45 mg / 5 days 70mg 57.5mg 60mg 57.5mg 60mg 60mg 60mg 60mg 62.5mg 62.5mg 62.5mg 62.5mg   INR 1.84 4.02 3.0 3.28 2.38 2.71 2.26 2.03 1.96 2.06 2.46 2.20 2.38   Notes postop  1x hold misdose         recv'd 8/28     Date 9/24 10/22 10/29             Total WeeklyDose 62.5mg 62.5mg 62.5mg             INR 2.27 2.23 2.10             Notes  Bactrim; clinda                * takes warfarin in AM    Phone Interview:  Tablet Strength: 7.5mg and 10mg tablet  Patient Contact Info: [phone] (mobile); [phone] (home)  Verbal Release Auth:   Lab Contact Info: EDUARDO Gonzalez [phone]; fax [phone]    Patient Findings:  Negatives:  Signs/symptoms of thrombosis, Signs/symptoms of bleeding, Laboratory test error suspected, Change in health, Change in alcohol use, Change in activity, Upcoming invasive procedure, Emergency department visit, Upcoming dental procedure, Missed doses, Extra doses, Change in medications, Change in diet/appetite, Hospital admission, Bruising, Other complaints     Plan:  1. INR is therapeutic again today at 2.10. INR received as verbal from lab. Patient has been off of antibiotic therapy for 1 week with no change. Instructed Mr. Harp to continue warfarin 10mg oral daily except 7.5mg on MonWedFri until next recheck.  2. Repeat INR 11/30.   3. Verbal information provided over the phone. Tito Brennan RBV dosing  instructions, expresses understanding by teach back, and has no further questions at this time.      Thank you,    Rere Hoffman, PharmD  Pharmacy Resident  10/29/2020  15:44 EDT    I, Nato Brown, LazaroD, have reviewed the note in full and agree with the assessment and plan.  10/29/20  16:05 EDT

## 2020-12-01 ENCOUNTER — ANTICOAGULATION VISIT (OUTPATIENT)
Dept: PHARMACY | Facility: HOSPITAL | Age: 60
End: 2020-12-01

## 2020-12-01 LAB — INR PPP: 6.9

## 2020-12-01 NOTE — PROGRESS NOTES
Anticoagulation Clinic - Remote Progress Note  Remote Lab    Indication: paroxysmal afib   Referring Provider: Ursula  Initial Warfarin Start Date: 18-19 years ago (2001)  Goal INR: 2.0-3.0  Current Drug Interactions: pantaprozole (moderate)  CHADS-VASc: 3 (HF, HTN)  Bleed Risk: [HASBLED; HIGH/MODERATE/LOW + REASON; H/O BLEED]  Other: [PREVIOUS ANTICOAGULATION, ETC]    Diet: no leafy greens 8/27/20  Alcohol:  no  Tobacco: no  OTC Pain Medication: occationally tylenol for sinus HA    INR History:  Date 3/2/20 3/10 3/16 3/24 4/1 4/10 4/24 5/15 6/11 6/25 7/9 7/30 8/27   Total WeeklyDose 45 mg / 5 days 70mg 57.5mg 60mg 57.5mg 60mg 60mg 60mg 60mg 62.5mg 62.5mg 62.5mg 62.5mg   INR 1.84 4.02 3.0 3.28 2.38 2.71 2.26 2.03 1.96 2.06 2.46 2.20 2.38   Notes postop  1x hold misdose         recv'd 8/28     Date 9/24 10/22 10/29 12/1            Total WeeklyDose 62.5mg 62.5mg 62.5mg 62.5mg            INR 2.27 2.23 2.10 6.90            Notes  Bactrim; clinda  ???              * takes warfarin in AM    Phone Interview:  Tablet Strength: 7.5mg and 10mg tablet  Patient Contact Info: [phone] (mobile); [phone] (home)  Verbal Release Auth:   Lab Contact Info: EDUARDO Gonzalez [phone]; fax [phone]    Negatives:  Signs/symptoms of thrombosis, Signs/symptoms of bleeding, Laboratory test error suspected, Change in health, Change in alcohol use, Change in activity, Upcoming invasive procedure, Emergency department visit, Upcoming dental procedure, Missed doses, Extra doses, Change in medications, Change in diet/appetite, Hospital admission, Bruising, Other complaints   Comments:  Patient denies all findings, stunned by results. Denies extra doses, abx, change in diet, medications, otc, etc.     Patient not agreeable to a serving of cooked greens, will have a large serving of broccoli. Reviewed s/sx of bleeding and patient agreeable to report to ED for any or head trauma. Has already taken today's warfarin dose. Agreeable  to check multiple times this week, will repeat INR tomorrow to rule out lab error. Will have only missed AM dose 12/2 by recheck       Patient Findings:  Plan:  1. INR is critically elevated at 6.90.  Instructed Mr. Brennan to HOLD warfarin until next recheck. Reviewed s/sx of bleeding and patient agreeable to report to ED for any or head trauma  2. Repeat INR 12/2  3. Verbal information provided over the phone. Titolisette Brennan RBV dosing instructions, expresses understanding by teach back, and has no further questions at this time.    Jordana Desir, LazaroD.  12/01/20   12:08 EST

## 2020-12-02 ENCOUNTER — ANTICOAGULATION VISIT (OUTPATIENT)
Dept: PHARMACY | Facility: HOSPITAL | Age: 60
End: 2020-12-02

## 2020-12-02 LAB — INR PPP: 3.97

## 2020-12-02 NOTE — PROGRESS NOTES
Anticoagulation Clinic - Remote Progress Note  Remote Lab    Indication: paroxysmal afib   Referring Provider: Ursula  Initial Warfarin Start Date: 18-19 years ago (2001)  Goal INR: 2.0-3.0  Current Drug Interactions: pantaprozole (moderate)  CHADS-VASc: 3 (HF, HTN)  Bleed Risk: [HASBLED; HIGH/MODERATE/LOW + REASON; H/O BLEED]  Other: [PREVIOUS ANTICOAGULATION, ETC]    Diet: no leafy greens 8/27/20  Alcohol:  no  Tobacco: no  OTC Pain Medication: occationally tylenol for sinus HA    INR History:  Date 3/2/20 3/10 3/16 3/24 4/1 4/10 4/24 5/15 6/11 6/25 7/9 7/30 8/27   Total WeeklyDose 45 mg / 5 days 70mg 57.5mg 60mg 57.5mg 60mg 60mg 60mg 60mg 62.5mg 62.5mg 62.5mg 62.5mg   INR 1.84 4.02 3.0 3.28 2.38 2.71 2.26 2.03 1.96 2.06 2.46 2.20 2.38   Notes postop  1x hold misdose         recv'd 8/28     Date 9/24 10/22 10/29 12/1 12/2           Total WeeklyDose 62.5mg 62.5mg 62.5mg 62.5mg 62.5mg           INR 2.27 2.23 2.10 6.90 3.97           Notes  Bactrim; clinda  ??? Lab error?              * takes warfarin in AM    Phone Interview:  Tablet Strength: 7.5mg and 10mg tablet  Patient Contact Info: [phone] (mobile); [phone] (home)  Verbal Release Auth:   Lab Contact Info: EDUARDO Gonzalez [phone]; fax [phone]    Patient Findings  Negatives:  Signs/symptoms of thrombosis, Signs/symptoms of bleeding, Laboratory test error suspected, Change in health, Change in alcohol use, Change in activity, Upcoming invasive procedure, Emergency department visit, Upcoming dental procedure, Missed doses, Extra doses, Change in medications, Change in diet/appetite, Hospital admission, Bruising, Other complaints   Comments:  INR decrease of 3 points from yesterday, considering lab draw was this AM around the time he takes his dose, had not seen effects of held dose at this time. Pt denies any GLV or Vit K foods last night. Unable to determine reason for decline.     Plan:  1. INR is elevated at 3.97, however 3 point  improvement from yesterday, possibly due to lab error. Instructed Mr. Brennan to decrease todays dose to 3.75mg then resume warfarin 10mg daily except 7.5mg MonWedFri until next recheck.   2. Repeat INR 12/7  3. Verbal information provided over the phone. Tito Brennan RBV dosing instructions, expresses understanding by teach back, and has no further questions at this time.    Leyla Saba, PharmD  12/02/20   12:11 EST

## 2020-12-07 ENCOUNTER — ANTICOAGULATION VISIT (OUTPATIENT)
Dept: PHARMACY | Facility: HOSPITAL | Age: 60
End: 2020-12-07

## 2020-12-07 LAB — INR PPP: 2.52

## 2020-12-07 NOTE — PROGRESS NOTES
Anticoagulation Clinic - Remote Progress Note  Remote Lab    Indication: paroxysmal afib   Referring Provider: Ursula  Initial Warfarin Start Date: 18-19 years ago (2001)  Goal INR: 2.0-3.0  Current Drug Interactions: pantaprozole (moderate)  CHADS-VASc: 3 (HF, HTN)  Bleed Risk: [HASBLED; HIGH/MODERATE/LOW + REASON; H/O BLEED]  Other: [PREVIOUS ANTICOAGULATION, ETC]    Diet: no leafy greens 8/27/20  Alcohol:  no  Tobacco: no  OTC Pain Medication: occationally tylenol for sinus HA    INR History:  Date 3/2/20 3/10 3/16 3/24 4/1 4/10 4/24 5/15 6/11 6/25 7/9 7/30 8/27   Total WeeklyDose 45 mg / 5 days 70mg 57.5mg 60mg 57.5mg 60mg 60mg 60mg 60mg 62.5mg 62.5mg 62.5mg 62.5mg   INR 1.84 4.02 3.0 3.28 2.38 2.71 2.26 2.03 1.96 2.06 2.46 2.20 2.38   Notes postop  1x hold misdose         recv'd 8/28     Date 9/24 10/22 10/29 12/1 12/2 12/7          Total WeeklyDose 62.5mg 62.5mg 62.5mg 62.5mg 62.5mg 55mg          INR 2.27 2.23 2.10 6.90 3.97 2.52          Notes  Bactrim; clinda  ??? Lab error?  1x missed            * takes warfarin in AM    Phone Interview:  Tablet Strength: 7.5mg and 10mg tablet  Patient Contact Info: [phone] (mobile); [phone] (home)  Verbal Release Auth:   Lab Contact Info: EDUARDO Gonzalez [phone]; fax [phone]    Patient Findings  Positives:  Missed doses   Negatives:  Signs/symptoms of thrombosis, Signs/symptoms of bleeding, Laboratory test error suspected, Change in health, Change in alcohol use, Change in activity, Upcoming invasive procedure, Emergency department visit, Upcoming dental procedure, Extra doses, Change in medications, Change in diet/appetite, Hospital admission, Bruising, Other complaints   Comments:  He reports that he missed his dose of warfarin last Wednesday instead of decreasing his dose     Plan:  1. INR is WNL at 2.54. Instructed Mr. Brennan to resume warfarin 10mg daily except 7.5mg MonWedFri until next recheck given his history of stability and no change upon pt  interview.  2. Repeat INR 12/15 to ensure WNL  3. Verbal information provided over the phone. Tito Brennan RBV dosing instructions, expresses understanding by teach back, and has no further questions at this time.    Leyla Saba, PharmD  12/07/20  15:17 EST

## 2020-12-15 ENCOUNTER — ANTICOAGULATION VISIT (OUTPATIENT)
Dept: PHARMACY | Facility: HOSPITAL | Age: 60
End: 2020-12-15

## 2020-12-15 LAB — INR PPP: 2.22

## 2020-12-15 NOTE — PROGRESS NOTES
Anticoagulation Clinic - Remote Progress Note  Remote Lab    Indication: paroxysmal afib   Referring Provider: Ursula  Initial Warfarin Start Date: 18-19 years ago (2001)  Goal INR: 2.0-3.0  Current Drug Interactions: pantaprozole (moderate)  CHADS-VASc: 3 (HF, HTN)  Bleed Risk: [HASBLED; HIGH/MODERATE/LOW + REASON; H/O BLEED]  Other: [PREVIOUS ANTICOAGULATION, ETC]    Diet: no leafy greens 8/27/20  Alcohol:  no  Tobacco: no  OTC Pain Medication: occationally tylenol for sinus HA    INR History:  Date 3/2/20 3/10 3/16 3/24 4/1 4/10 4/24 5/15 6/11 6/25 7/9 7/30 8/27   Total WeeklyDose 45 mg / 5 days 70mg 57.5mg 60mg 57.5mg 60mg 60mg 60mg 60mg 62.5mg 62.5mg 62.5mg 62.5mg   INR 1.84 4.02 3.0 3.28 2.38 2.71 2.26 2.03 1.96 2.06 2.46 2.20 2.38   Notes postop  1x hold misdose         recv'd 8/28     Date 9/24 10/22 10/29 12/1 12/2 12/7 12/14         Total WeeklyDose 62.5mg 62.5mg 62.5mg 62.5mg 62.5mg 55mg 62.5 mg         INR 2.27 2.23 2.10 6.90 3.97 2.52 2.22         Notes  Bactrim; clinda  ??? Lab error?  1x missed rec'd 12/15           * takes warfarin in AM    Phone Interview:  Tablet Strength: 7.5mg and 10mg tablet  Patient Contact Info: [phone] (mobile); [phone] (home)  Verbal Release Auth:   Lab Contact Info: EDUARDO Gonzalez [phone]; fax [phone]    Patient Findings  Negatives:  Signs/symptoms of thrombosis, Signs/symptoms of bleeding, Laboratory test error suspected, Change in health, Change in alcohol use, Change in activity, Upcoming invasive procedure, Emergency department visit, Upcoming dental procedure, Missed doses, Extra doses, Change in medications, Change in diet/appetite, Hospital admission, Bruising, Other complaints   Comments:  He denies any changes.     He stated that he watches his doses closely now; he stated that he may have doubled his doses the week prior to his recent supra therapeutic INR.  Encouraged him to be cautious and double check his doses.   Otherwise, above  findings negative     Plan:  1. INR was therapeutic yesterday at 2.22. Results received today after calling [facility] to request information.  Instructed Mr. Brennan to continue warfarin 10mg oral daily except 7.5mg MonWedFri until next recheck given his history of stability and no change upon pt interview.  2. Repeat INR in three weeks on January 4th.  3. Verbal information provided over the phone. Tito Brennan RBV dosing instructions, expresses understanding by teach back, and has no further questions at this time.    Bing Flynn, PharmD  12/15/20  13:27 EST

## 2021-01-05 ENCOUNTER — ANTICOAGULATION VISIT (OUTPATIENT)
Dept: PHARMACY | Facility: HOSPITAL | Age: 61
End: 2021-01-05

## 2021-01-05 ENCOUNTER — TELEPHONE (OUTPATIENT)
Dept: CARDIOLOGY | Facility: CLINIC | Age: 61
End: 2021-01-05

## 2021-01-05 LAB — INR PPP: 2.94

## 2021-01-05 RX ORDER — WARFARIN SODIUM 10 MG/1
10 TABLET ORAL DAILY
Qty: 90 TABLET | Refills: 0 | Status: SHIPPED | OUTPATIENT
Start: 2021-01-05 | End: 2021-06-25 | Stop reason: SDUPTHER

## 2021-01-05 RX ORDER — WARFARIN SODIUM 10 MG/1
10 TABLET ORAL DAILY
Qty: 90 TABLET | Refills: 0 | Status: SHIPPED | OUTPATIENT
Start: 2021-01-05 | End: 2021-01-05 | Stop reason: SDUPTHER

## 2021-01-05 NOTE — TELEPHONE ENCOUNTER
Called to obtain recommendations for holding warfarin for dental work (extractions and cleaning).  Per Dr. Vergara VO: May hold Coumadin up to 4 days if needed for upcoming dental work.

## 2021-01-05 NOTE — PROGRESS NOTES
Anticoagulation Clinic - Remote Progress Note  Remote Lab    Indication: paroxysmal afib   Referring Provider: Ursula  Initial Warfarin Start Date: 18-19 years ago (2001)  Goal INR: 2.0-3.0  Current Drug Interactions: pantaprozole (moderate)  CHADS-VASc: 3 (HF, HTN)  Bleed Risk: [HASBLED; HIGH/MODERATE/LOW + REASON; H/O BLEED]  Other: [PREVIOUS ANTICOAGULATION, ETC]    Diet: no leafy greens 8/27/20  Alcohol:  no  Tobacco: no  OTC Pain Medication: occationally tylenol for sinus HA    INR History:  Date 3/2/20 3/10 3/16 3/24 4/1 4/10 4/24 5/15 6/11 6/25 7/9 7/30 8/27   Total WeeklyDose 45 mg / 5 days 70mg 57.5mg 60mg 57.5mg 60mg 60mg 60mg 60mg 62.5mg 62.5mg 62.5mg 62.5mg   INR 1.84 4.02 3.0 3.28 2.38 2.71 2.26 2.03 1.96 2.06 2.46 2.20 2.38   Notes postop  1x hold misdose         sharifa'd 8/28     Date 9/24 10/22 10/29 12/1 12/2 12/7 12/14 1/5/21        Total WeeklyDose 62.5mg 62.5mg 62.5mg 62.5mg 62.5mg 55mg 62.5mg 62.5mg        INR 2.27 2.23 2.10 6.90 3.97 2.52 2.22 2.94        Notes  Bactrim; clinda  ??? Lab error?  1x miss skaggs'estela 12/15           * takes warfarin in AM    Phone Interview:  Tablet Strength: 7.5mg and 10mg tablet  Patient Contact Info: [phone] (mobile); [phone] (home)  Verbal Release Auth:   Lab Contact Info: EDUARDO Gonzalez [phone]; fax [phone]    Patient Findings  Positives:  Upcoming dental procedure   Negatives:  Signs/symptoms of thrombosis, Signs/symptoms of bleeding, Laboratory test error suspected, Change in health, Change in alcohol use, Change in activity, Upcoming invasive procedure, Emergency department visit, Missed doses, Extra doses, Change in medications, Change in diet/appetite, Hospital admission, Bruising, Other complaints   Comments:  Patient may have dental procedure in the future, date currently not scheduled. Per telephone encounter w/Dr. Vergara, patient would be cleared to hold warfarin up to 4 days if needed. Patient is agreeable to contact clinic if he  finds out any further information.     Plan:  1. INR is therapeutic today at 2.94. Instructed Mr. Brennan to continue warfarin 10mg oral daily except 7.5mg MonWedFri until next recheck   2. Repeat INR in 4 weeks, 2/2  3. Verbal information provided over the phone. Tito Brennan RBV dosing instructions, expresses understanding by teach back, and has no further questions at this time.  4. Patient requests refills on his warfarin 10mg tablet to be called into [facility] in [place], [place]    Andrew Iqbal, [facility]  1/5/2021  14:30 EST     I, Nato Brown, PharmD, assisted in the patient interview. I have reviewed the note in full and agree with the assessment and plan.  01/05/21  16:13 EST  Refills sent. Must make appointment for more refills.

## 2021-01-12 RX ORDER — SACUBITRIL AND VALSARTAN 49; 51 MG/1; MG/1
1 TABLET, FILM COATED ORAL 2 TIMES DAILY
Qty: 60 TABLET | Refills: 6 | Status: SHIPPED | OUTPATIENT
Start: 2021-01-12 | End: 2021-06-28 | Stop reason: SDUPTHER

## 2021-01-12 RX ORDER — METOPROLOL SUCCINATE 100 MG/1
100 TABLET, EXTENDED RELEASE ORAL 2 TIMES DAILY
Qty: 60 TABLET | Refills: 6 | Status: SHIPPED | OUTPATIENT
Start: 2021-01-12 | End: 2021-06-28 | Stop reason: SDUPTHER

## 2021-01-12 NOTE — TELEPHONE ENCOUNTER
Patient's BP has been running a little higher and he is now able to tolerate the increased dose of Entresto 49/51 mg BID. He is also requesting a refill of Metoprolol 100 mg BID.

## 2021-02-08 ENCOUNTER — ANTICOAGULATION VISIT (OUTPATIENT)
Dept: PHARMACY | Facility: HOSPITAL | Age: 61
End: 2021-02-08

## 2021-02-08 LAB — INR PPP: 2.46

## 2021-02-08 NOTE — PROGRESS NOTES
Anticoagulation Clinic - Remote Progress Note  Remote Lab    Indication: paroxysmal afib   Referring Provider: Ursula  Initial Warfarin Start Date: 18-19 years ago (2001)  Goal INR: 2.0-3.0  Current Drug Interactions: pantaprozole (moderate)  CHADS-VASc: 3 (HF, HTN)  Bleed Risk: [HASBLED; HIGH/MODERATE/LOW + REASON; H/O BLEED]  Other: [PREVIOUS ANTICOAGULATION, ETC]    Diet: no leafy greens 8/27/20  Alcohol:  no  Tobacco: no  OTC Pain Medication: occationally tylenol for sinus HA    INR History:  Date 3/2/20 3/10 3/16 3/24 4/1 4/10 4/24 5/15 6/11 6/25 7/9 7/30 8/27   Total WeeklyDose 45 mg / 5 days 70mg 57.5mg 60mg 57.5mg 60mg 60mg 60mg 60mg 62.5mg 62.5mg 62.5mg 62.5mg   INR 1.84 4.02 3.0 3.28 2.38 2.71 2.26 2.03 1.96 2.06 2.46 2.20 2.38   Notes postop  1x hold misdose         recv'd 8/28     Date 9/24 10/22 10/29 12/1 12/2 12/7 12/14 1/5/21 2/4       Total WeeklyDose 62.5mg 62.5mg 62.5mg 62.5mg 62.5mg 55mg 62.5mg 62.5mg 62.5mg       INR 2.27 2.23 2.10 6.90 3.97 2.52 2.22 2.94 2.46       Notes  Bactrim; clinda  ??? Lab error?  1x miss recv'd 12/15  recv'd 2/8         * takes warfarin in AM    Phone Interview:  Tablet Strength: 7.5mg and 10mg tablet  Patient Contact Info: [phone] (mobile); [phone] (home)  Verbal Release Auth:   Lab Contact Info: EDUARDO Gonzalez [phone]; fax [phone]    Patient Findings  Negatives:  Signs/symptoms of thrombosis, Signs/symptoms of bleeding, Laboratory test error suspected, Change in health, Change in alcohol use, Change in activity, Upcoming invasive procedure, Emergency department visit, Upcoming dental procedure, Missed doses, Extra doses, Change in medications, Change in diet/appetite, Hospital admission, Bruising, Other complaints     Plan:  1. INR was therapeutic on 2/4 at 2.46. Results received 2/8. Instructed Mr. Brennan to continue warfarin 10mg oral daily except 7.5mg MonWedFri until next recheck   2. Repeat INR in 4 weeks, 3/4  3. Verbal information provided  over the phone. Tito Brennan RBV dosing instructions, expresses understanding by teach back, and has no further questions at this time.    Andrew Iqbal, [facility]  2/8/2021  09:27 EST    I, Bing Flynn, PharmD, have reviewed the note in full and agree with the assessment and plan.  02/08/21  14:50 EST

## 2021-03-08 ENCOUNTER — ANTICOAGULATION VISIT (OUTPATIENT)
Dept: PHARMACY | Facility: HOSPITAL | Age: 61
End: 2021-03-08

## 2021-03-08 LAB — INR PPP: 2.68

## 2021-03-08 NOTE — PROGRESS NOTES
Anticoagulation Clinic - Remote Progress Note  Remote Lab    Indication: paroxysmal afib   Referring Provider: Ursula  Initial Warfarin Start Date: 18-19 years ago (2001)  Goal INR: 2.0-3.0  Current Drug Interactions: pantaprozole (moderate)  CHADS-VASc: 3 (HF, HTN)  Bleed Risk: [HASBLED; HIGH/MODERATE/LOW + REASON; H/O BLEED]  Other: [PREVIOUS ANTICOAGULATION, ETC]    Diet: no leafy greens 8/27/20  Alcohol:  no  Tobacco: no  OTC Pain Medication: occationally tylenol for sinus HA    INR History:  Date 3/2/20 3/10 3/16 3/24 4/1 4/10 4/24 5/15 6/11 6/25 7/9 7/30 8/27   Total WeeklyDose 45 mg / 5 days 70mg 57.5mg 60mg 57.5mg 60mg 60mg 60mg 60mg 62.5mg 62.5mg 62.5mg 62.5mg   INR 1.84 4.02 3.0 3.28 2.38 2.71 2.26 2.03 1.96 2.06 2.46 2.20 2.38   Notes postop  1x hold misdose         recv'd 8/28     Date 9/24 10/22 10/29 12/1 12/2 12/7 12/14 1/5/21 2/4 3/7      Total WeeklyDose 62.5mg 62.5mg 62.5mg 62.5mg 62.5mg 55mg 62.5mg 62.5mg 62.5mg 62.5mg      INR 2.27 2.23 2.10 6.90 3.97 2.52 2.22 2.94 2.46 2.68      Notes  Bactrim; clinda  ??? Lab error?  1x miss recv'd 12/15  recv'd 2/8         * takes warfarin in AM    Phone Interview:  Tablet Strength: 7.5mg and 10mg tablet  Patient Contact Info: [phone] (mobile); [phone] (home)  Verbal Release Auth:   Lab Contact Info: EDUARDO Gonzalez [phone]; fax [phone]    Patient Findings  Negatives:  Signs/symptoms of thrombosis, Signs/symptoms of bleeding, Laboratory test error suspected, Change in health, Change in alcohol use, Change in activity, Upcoming invasive procedure, Emergency department visit, Upcoming dental procedure, Missed doses, Extra doses, Change in medications, Change in diet/appetite, Hospital admission, Bruising, Other complaints     Plan:  1. INR was therapeutic yesterday at 2.68. Received verbal from Lab. Instructed Mr. Brennan to continue warfarin 10mg oral daily except 7.5mg MonWedFri until next recheck   2. Repeat INR in 4 weeks  3. Verbal  information provided over the phone. Tito Brennan RBV dosing instructions, expresses understanding by teach back, and has no further questions at this time.    Andrew Iqbal, [facility]  3/8/2021  15:53 EST     I, Leyla Saba, PharmD, have reviewed the note in full and agree with the assessment and plan.  03/09/21  09:44 EST

## 2021-04-12 ENCOUNTER — TELEPHONE (OUTPATIENT)
Dept: PHARMACY | Facility: HOSPITAL | Age: 61
End: 2021-04-12

## 2021-04-12 NOTE — TELEPHONE ENCOUNTER
Patient will have procedure with [facility]. Per LEIDA Knight, date has yet to be scheduled but they will want patient's INR to be <= 2.5 when drawn day prior to procedure.    LEIDA Knight, is going to reach out to patient to determine procedure date and then will contact clinic.

## 2021-04-13 NOTE — TELEPHONE ENCOUNTER
Patient will have procedure on 4/28 with [place] [facility]. Patient is agreeable to have INR drawn this week. Have briefly discussed plan that patient will need to have INR drawn on 4/27, day before procedure. Will likely have patient eat good serving of GLV on 4/26 to help ensure INR <=2.5.

## 2021-04-15 ENCOUNTER — ANTICOAGULATION VISIT (OUTPATIENT)
Dept: PHARMACY | Facility: HOSPITAL | Age: 61
End: 2021-04-15

## 2021-04-15 LAB — INR PPP: 2.5

## 2021-04-15 NOTE — PROGRESS NOTES
Anticoagulation Clinic - Remote Progress Note  Remote Lab    Indication: paroxysmal afib   Referring Provider: Ursula  Initial Warfarin Start Date: 18-19 years ago (2001)  Goal INR: 2.0-3.0  Current Drug Interactions: pantaprozole (moderate)  CHADS-VASc: 3 (HF, HTN)      Diet: no leafy greens 4/15/21  Alcohol:  no  Tobacco: no  OTC Pain Medication: occationally tylenol for sinus HA    INR History:  Date 3/2/20 3/10 3/16 3/24 4/1 4/10 4/24 5/15 6/11 6/25 7/9 7/30 8/27   Total WeeklyDose 45 mg / 5 days 70mg 57.5mg 60mg 57.5mg 60mg 60mg 60mg 60mg 62.5mg 62.5mg 62.5mg 62.5mg   INR 1.84 4.02 3.0 3.28 2.38 2.71 2.26 2.03 1.96 2.06 2.46 2.20 2.38   Notes postop  1x hold misdose         recv'd 8/28     Date 9/24 10/22 10/29 12/1 12/2 12/7 12/14 1/5/21 2/4 3/7 4/15     Total WeeklyDose 62.5mg 62.5mg 62.5mg 62.5mg 62.5mg 55mg 62.5mg 62.5mg 62.5mg 62.5mg 62.5 mg     INR 2.27 2.23 2.10 6.90 3.97 2.52 2.22 2.94 2.46 2.68 2.50     Notes  Bactrim; clinda  ??? Lab error?  1x miss recv'd 12/15  recv'd 2/8         * takes warfarin in AM    Phone Interview:  Tablet Strength: 7.5mg and 10mg tablet  Patient Contact Info: [phone] (mobile); [phone] (home)  Verbal Release Auth:   Lab Contact Info: EDUARDO Gonzalez [phone]; fax [phone]    Patient Findings:  Positives:  Upcoming dental procedure   Negatives:  Signs/symptoms of thrombosis, Signs/symptoms of bleeding, Laboratory test error suspected, Change in health, Change in alcohol use, Change in activity, Upcoming invasive procedure, Emergency department visit, Missed doses, Extra doses, Change in medications, Change in diet/appetite, Hospital admission, Bruising, Other complaints   Comments:  Having dental procedure on 4/28. [facility] (Dr. Fitzgerald, [phone]) do NOT require warfarin hold but however require INR to be less than 2.5 day prior to surgery. Mr. Brennan takes his warfarin in the morning. Confirmed with Dr. Fitzgerald's  office as long as INR is <2.5 the day before he is ok to take his warfarin the morning of procedure.     Plan:  1. INR is therapeutic today. Instructed Mr. Brennan to decrease dose on 4/25 to 7.5 mg but otherwise continue maintenance dose of warfarin 10 mg daily except 7.5 mg MonWedFri until recheck.  2. Repeat INR on 4/27 prior to dental procedure. Mr. Brennan will need to be reminded to eat a serving of GLV the evening of the 27th.   3. Verbal information provided over the phone. Titobennie Brennan RBV dosing instructions, expresses understanding by teach back, and has no further questions at this time.  4. Discussed periop plan with patient today. He voiced understanding and has no further questions at this time.    Heather Light, Tommy  4/15/2021  12:21 EDT      Requested INR <2.5 faxed to [facility] [phone] prior to procedure  I, Jordana Desir, PharmD, have reviewed the note in full and agree with the assessment and plan.  04/15/21  16:17 EDT

## 2021-04-27 ENCOUNTER — ANTICOAGULATION VISIT (OUTPATIENT)
Dept: PHARMACY | Facility: HOSPITAL | Age: 61
End: 2021-04-27

## 2021-04-27 DIAGNOSIS — I48.19 ATRIAL FIBRILLATION, PERSISTENT (HCC): Primary | ICD-10-CM

## 2021-04-27 LAB — INR PPP: 2.31

## 2021-04-27 NOTE — PROGRESS NOTES
Anticoagulation Clinic - Remote Progress Note  Remote Lab    Indication: paroxysmal afib   Referring Provider: Ursula  Initial Warfarin Start Date: 18-19 years ago (2001)  Goal INR: 2.0-3.0  Current Drug Interactions: pantaprozole (moderate)  CHADS-VASc: 3 (HF, HTN)    Diet: no leafy greens 4/27/21  Alcohol:  no  Tobacco: no  OTC Pain Medication: occationally tylenol for sinus HA    INR History:  Date 3/2/20 3/10 3/16 3/24 4/1 4/10 4/24 5/15 6/11 6/25 7/9 7/30 8/27   Total WeeklyDose 45 mg / 5 days 70mg 57.5mg 60mg 57.5mg 60mg 60mg 60mg 60mg 62.5mg 62.5mg 62.5mg 62.5mg   INR 1.84 4.02 3.0 3.28 2.38 2.71 2.26 2.03 1.96 2.06 2.46 2.20 2.38   Notes postop  1x hold misdose         recv'd 8/28     Date 9/24 10/22 10/29 12/1 12/2 12/7 12/14 1/5/21 2/4 3/7 4/15 4/27    Total WeeklyDose 62.5mg 62.5mg 62.5mg 62.5mg 62.5mg 55mg 62.5mg 62.5mg 62.5mg 62.5mg 62.5 mg 60 mg    INR 2.27 2.23 2.10 6.90 3.97 2.52 2.22 2.94 2.46 2.68 2.50 2.31    Notes  Bactrim; clinda  ??? Lab error?  1x miss recv'd 12/15  recv'd 2/8         * takes warfarin in AM    Phone Interview:  Tablet Strength: 7.5mg and 10mg tablet  Patient Contact Info: [phone] (mobile); [phone] (home)  Verbal Release Auth:   Lab Contact Info: EDUARDO Gonzalez [phone]; fax [phone]    Patient Findings:  Positives:  Upcoming dental procedure   Negatives:  Signs/symptoms of thrombosis, Signs/symptoms of bleeding, Laboratory test error suspected, Change in health, Change in alcohol use, Change in activity, Upcoming invasive procedure, Emergency department visit, Missed doses, Extra doses, Change in medications, Change in diet/appetite, Hospital admission, Bruising, Other complaints   Comments:  Mr. Brennan took 7.5 mg this morning. He was not instructed by clinic to lower dose however were planning to ask him to do so today. He was not agreeable to take warfarin tomorrow despite being instructed NOT to hold warfarin for procedure. Advised Mr. Brennan to hold  warfarin until his appointment, confirm with Dr. Fitzgerald if ok to take warfarin dose tomorrow and to CALL CLINIC tomorrow. If ok to continue warfarin, instructed Mr. Brennan to take as soon as he gets home from his appointment tomorrow and he could take his dose within a 12 hour window. He was agreeable. Denies any other changes.     Plan:  1. INR is therapeutic today prior to procedure tomorrow. Instructed Mr. Brennan to decrease tonight's dose to 7.5 mg then otherwise continue maintenance dose of warfarin 10 mg daily except 7.5 mg on MonWedFri until recheck.  2. Repeat INR in two weeks.  3. Verbal information provided over the phone. Tito Brennan RBV dosing instructions, expresses understanding by teach back, and has no further questions at this time.  4. Faxed INR today to Dr. Fitzgerald's office (4/27)    Heather Light CPhT  4/27/2021  10:13 EDT    I, Jordana Desir, PharmD, have reviewed the note in full and agree with the assessment and plan.  04/27/21  16:12 EDT

## 2021-05-13 ENCOUNTER — ANTICOAGULATION VISIT (OUTPATIENT)
Dept: PHARMACY | Facility: HOSPITAL | Age: 61
End: 2021-05-13

## 2021-05-13 LAB — INR PPP: 2.58

## 2021-05-13 NOTE — PROGRESS NOTES
Anticoagulation Clinic - Remote Progress Note  Remote Lab    Indication: paroxysmal afib   Referring Provider: Ursula  Initial Warfarin Start Date: 18-19 years ago (2001)  Goal INR: 2.0-3.0  Current Drug Interactions: pantaprozole (moderate)  CHADS-VASc: 3 (HF, HTN)    Diet: no leafy greens 4/27/21  Alcohol:  no  Tobacco: no  OTC Pain Medication: occationally tylenol for sinus HA    INR History:  Date 3/2/20 3/10 3/16 3/24 4/1 4/10 4/24 5/15 6/11 6/25 7/9 7/30 8/27   Total WeeklyDose 45 mg / 5 days 70mg 57.5mg 60mg 57.5mg 60mg 60mg 60mg 60mg 62.5mg 62.5mg 62.5mg 62.5mg   INR 1.84 4.02 3.0 3.28 2.38 2.71 2.26 2.03 1.96 2.06 2.46 2.20 2.38   Notes postop  1x hold misdose         recv'd 8/28     Date 9/24 10/22 10/29 12/1 12/2 12/7 12/14 1/5/21 2/4 3/7 4/15 4/27 5/12   Total WeeklyDose 62.5mg 62.5mg 62.5mg 62.5mg 62.5mg 55mg 62.5mg 62.5mg 62.5mg 62.5mg 62.5mg 60mg 62.5mg   INR 2.27 2.23 2.10 6.90 3.97 2.52 2.22 2.94 2.46 2.68 2.50 2.31 2.58   Notes  Bactrim; clinda  ??? Lab error?  1x miss recv'd 12/15  recv'd 2/8    1x miss; recv'd 5/13     Date                Total WeeklyDose                INR                Notes                    * takes warfarin in AM    Phone Interview:  Tablet Strength: 7.5mg and 10mg tablet  Patient Contact Info: [phone] (mobile); [phone] (home)  Verbal Release Auth:   Lab Contact Info: EDUARDO Gonzalez [phone]; fax [phone]    Patient Findings  Positives:  Missed doses, Other complaints   Negatives:  Signs/symptoms of thrombosis, Signs/symptoms of bleeding, Laboratory test error suspected, Change in health, Change in alcohol use, Change in activity, Upcoming invasive procedure, Emergency department visit, Upcoming dental procedure, Extra doses, Change in medications, Change in diet/appetite, Hospital admission, Bruising   Comments:  Patient reports he skipped 1x dose of warfarin on 4/28 after dental appt. He was not asked to hold dose but reports he felt he needed to.  Otherwise reports he fell yesterday and has f/u appt w/PCP this afternoon. He is agreeable to contact clinic with any updates or medication changes. Otherwise denies findings.      Plan:  1. INR was therapeutic yesterday at 2.58. Instructed Mr. Brennan to continue maintenance dose of warfarin 10mg oral daily except 7.5mg on MonWedFri until recheck.  2. Repeat INR in two weeks, 5/26  3. Verbal information provided over the phone. Titobennie Brennan RBV dosing instructions, expresses understanding by teach back, and has no further questions at this time.    Andrew Iqbal CPhT  5/13/2021  13:40 EDT    Patient reports he will be starting Cephalexin 250mg q8h x21 doses- will repeat inr 5/18  IJordana, PharmD, have reviewed the note in full and agree with the assessment and plan.  05/14/21  08:13 EDT

## 2021-05-13 NOTE — PROGRESS NOTES
Anticoagulation Clinic - Remote Progress Note  Remote Lab    Indication: paroxysmal afib   Referring Provider: Ursula  Initial Warfarin Start Date: 18-19 years ago (2001)  Goal INR: 2.0-3.0  Current Drug Interactions: pantaprozole (moderate)  CHADS-VASc: 3 (HF, HTN)    Diet: no leafy greens 4/27/21  Alcohol:  no  Tobacco: no  OTC Pain Medication: occationally tylenol for sinus HA    INR History:  Date 3/2/20 3/10 3/16 3/24 4/1 4/10 4/24 5/15 6/11 6/25 7/9 7/30 8/27   Total WeeklyDose 45 mg / 5 days 70mg 57.5mg 60mg 57.5mg 60mg 60mg 60mg 60mg 62.5mg 62.5mg 62.5mg 62.5mg   INR 1.84 4.02 3.0 3.28 2.38 2.71 2.26 2.03 1.96 2.06 2.46 2.20 2.38   Notes postop  1x hold misdose         recv'd 8/28     Date 9/24 10/22 10/29 12/1 12/2 12/7 12/14 1/5/21 2/4 3/7 4/15 4/27 5/12   Total WeeklyDose 62.5mg 62.5mg 62.5mg 62.5mg 62.5mg 55mg 62.5mg 62.5mg 62.5mg 62.5mg 62.5 mg 60 mg 62.5 mg   INR 2.27 2.23 2.10 6.90 3.97 2.52 2.22 2.94 2.46 2.68 2.50 2.31 2.58   Notes  Bactrim; clinda  ??? Lab error?  1x miss recv'd 12/15  recv'd 2/8    rec'd 5/13     * takes warfarin in AM    Phone Interview:  Tablet Strength: 7.5mg and 10mg tablet  Patient Contact Info: [phone] (mobile); [phone] (home)  Verbal Release Auth:   Lab Contact Info: EDUARDO Gonzalez [phone]; fax [phone]    [facility] 5/13 @ [facility]    Plan:  1. INR was therapeutic yesterday although results not received until today. Instructed Mr. Brennan to continue maintenance dose of warfarin 10 mg daily except 7.5 mg on MonWedFri until recheck.  2. Repeat INR in 4 weeks, 6/9.  3. Verbal information provided over the phone. Tito Brennan RBV dosing instructions, expresses understanding by teach back, and has no further questions at this time.  4. Patient declines need for warfarin refills at this time.     Heather Light CPhT  5/13/2021  09:36 EDT

## 2021-05-18 ENCOUNTER — ANTICOAGULATION VISIT (OUTPATIENT)
Dept: PHARMACY | Facility: HOSPITAL | Age: 61
End: 2021-05-18

## 2021-05-18 LAB — INR PPP: 2.06

## 2021-06-02 ENCOUNTER — ANTICOAGULATION VISIT (OUTPATIENT)
Dept: PHARMACY | Facility: HOSPITAL | Age: 61
End: 2021-06-02

## 2021-06-02 LAB — INR PPP: 2.01

## 2021-06-02 NOTE — PROGRESS NOTES
Anticoagulation Clinic - Remote Progress Note  Remote Lab    Indication: paroxysmal afib   Referring Provider: Ursula  Initial Warfarin Start Date: 18-19 years ago (2001)  Goal INR: 2.0-3.0  Current Drug Interactions: pantaprozole (moderate)  CHADS-VASc: 3 (HF, HTN)    Diet: no leafy greens 5/18/21  Alcohol:  no  Tobacco: no  OTC Pain Medication: occationally tylenol for sinus HA    INR History:  Date 3/2/20 3/10 3/16 3/24 4/1 4/10 4/24 5/15 6/11 6/25 7/9 7/30 8/27   Total WeeklyDose 45 mg / 5 days 70mg 57.5mg 60mg 57.5mg 60mg 60mg 60mg 60mg 62.5mg 62.5mg 62.5mg 62.5mg   INR 1.84 4.02 3.0 3.28 2.38 2.71 2.26 2.03 1.96 2.06 2.46 2.20 2.38   Notes postop  1x hold misdose         recv'd 8/28     Date 9/24 10/22 10/29 12/1 12/2 12/7 12/14 1/5/21 2/4 3/7 4/15 4/27 5/12   Total WeeklyDose 62.5mg 62.5mg 62.5mg 62.5mg 62.5mg 55mg 62.5mg 62.5mg 62.5mg 62.5mg 62.5mg 60mg 62.5mg   INR 2.27 2.23 2.10 6.90 3.97 2.52 2.22 2.94 2.46 2.68 2.50 2.31 2.58   Notes  Bactrim; clinda  ??? Lab error?  1x miss recv'd 12/15  recv'd 2/8    1x miss; recv'd 5/13     Date 5/18 6/2              Total WeeklyDose 62.5mg 52.5mg 66.25  mg             INR 2.06 2.01              Notes  1x miss 1x incr dose               * takes warfarin in AM    Phone Interview:  Tablet Strength: 7.5mg and 10mg tablet  Patient Contact Info: [phone] (mobile); [phone] (home)  Verbal Release Auth:   Lab Contact Info: EDUARDO Gonzalez [phone]; fax [phone]    Patient Findings  Positives:  Missed doses   Negatives:  Signs/symptoms of thrombosis, Signs/symptoms of bleeding, Laboratory test error suspected, Change in health, Change in alcohol use, Change in activity, Upcoming invasive procedure, Emergency department visit, Upcoming dental procedure, Extra doses, Change in medications, Change in diet/appetite, Hospital admission, Bruising, Other complaints   Comments:  Patient reports he missed 1x dose of warfarin last night. Otherwise denies findings       Plan:  1. INR is therapeutic today at 2.01, again at [facility]. After consulting with Nato Brown, PharmD, instructed Mr. Brennan to INCREASE tonight's dose to 11.25mg and then continue current maintenance dose of warfarin 10mg oral daily except 7.5mg on MonWedFri until recheck.  2. Repeat INR in 1 week to ensure WNL  3. Verbal information provided over the phone. Titolisette Brennan RBV dosing instructions, expresses understanding by teach back, and has no further questions at this time.    Andrew Iqbal, Tommy  6/2/2021  16:10 EDT      I, Andrew Karimi, PharmD, have reviewed the note in full and agree with the assessment and plan.  06/03/21  08:20 EDT

## 2021-06-11 ENCOUNTER — ANTICOAGULATION VISIT (OUTPATIENT)
Dept: PHARMACY | Facility: HOSPITAL | Age: 61
End: 2021-06-11

## 2021-06-11 LAB — INR PPP: 3.26

## 2021-06-11 NOTE — PROGRESS NOTES
Anticoagulation Clinic - Remote Progress Note  Remote Lab    Indication: paroxysmal afib   Referring Provider: Ursula  Initial Warfarin Start Date: 18-19 years ago (2001)  Goal INR: 2.0-3.0  Current Drug Interactions: pantaprozole (moderate)  CHADS-VASc: 3 (HF, HTN)    Diet: no leafy greens 5/18/21  Alcohol:  no  Tobacco: no  OTC Pain Medication: occationally tylenol for sinus HA    INR History:  Date 3/2/20 3/10 3/16 3/24 4/1 4/10 4/24 5/15 6/11 6/25 7/9 7/30 8/27   Total WeeklyDose 45 mg / 5 days 70mg 57.5mg 60mg 57.5mg 60mg 60mg 60mg 60mg 62.5mg 62.5mg 62.5mg 62.5mg   INR 1.84 4.02 3.0 3.28 2.38 2.71 2.26 2.03 1.96 2.06 2.46 2.20 2.38   Notes postop  1x hold misdose         recv'd 8/28     Date 9/24 10/22 10/29 12/1 12/2 12/7 12/14 1/5/21 2/4 3/7 4/15 4/27 5/12   Total WeeklyDose 62.5mg 62.5mg 62.5mg 62.5mg 62.5mg 55mg 62.5mg 62.5mg 62.5mg 62.5mg 62.5mg 60mg 62.5mg   INR 2.27 2.23 2.10 6.90 3.97 2.52 2.22 2.94 2.46 2.68 2.50 2.31 2.58   Notes  Bactrim; clinda  ??? Lab error?  1x miss recv'd 12/15  recv'd 2/8    1x miss; recv'd 5/13     Date 5/18 6/2 6/11             Total WeeklyDose 62.5mg 52.5mg 66.25  mg             INR 2.06 2.01 3.26             Notes  1x miss 1x incr dose               * takes warfarin in AM    Phone Interview:  Tablet Strength: 7.5mg and 10mg tablet  Patient Contact Info: [phone] (mobile); [phone] (home)  Verbal Release Auth:   Lab Contact Info: EDUARDO Guylan [phone]; fax [phone]    Patient Findings    Negatives:  Signs/symptoms of thrombosis, Signs/symptoms of bleeding, Laboratory test error suspected, Change in health, Change in alcohol use, Change in activity, Upcoming invasive procedure, Emergency department visit, Upcoming dental procedure, Missed doses, Extra doses, Change in medications, Change in diet/appetite, Hospital admission, Bruising, Other complaints         Plan:  1. INR is supratherapeutic today at 3.26 after boosted dose. Instructed Mr. Brennan to  continue current maintenance dose of warfarin 10mg oral daily except 7.5mg on MonWedFri until recheck.  2. Repeat INR 6/21  3. Verbal information provided over the phone. Tito Brennan RBV dosing instructions, expresses understanding by teach back, and has no further questions at this time.    Thank you,    Nato WillisD, CARLYN  6/11/2021  16:07 EDT

## 2021-06-21 ENCOUNTER — ANTICOAGULATION VISIT (OUTPATIENT)
Dept: PHARMACY | Facility: HOSPITAL | Age: 61
End: 2021-06-21

## 2021-06-21 LAB — INR PPP: 2.25

## 2021-06-21 NOTE — PROGRESS NOTES
Anticoagulation Clinic - Remote Progress Note  Remote Lab    Indication: paroxysmal afib   Referring Provider: Ursula  Initial Warfarin Start Date: 18-19 years ago (2001)  Goal INR: 2.0-3.0  Current Drug Interactions: pantaprozole (moderate)  CHADS-VASc: 3 (HF, HTN)    Diet: no leafy greens 5/18/21  Alcohol:  no  Tobacco: no  OTC Pain Medication: occationally tylenol for sinus HA    INR History:  Date 3/2/20 3/10 3/16 3/24 4/1 4/10 4/24 5/15 6/11 6/25 7/9 7/30 8/27   Total WeeklyDose 45 mg / 5 days 70mg 57.5mg 60mg 57.5mg 60mg 60mg 60mg 60mg 62.5mg 62.5mg 62.5mg 62.5mg   INR 1.84 4.02 3.0 3.28 2.38 2.71 2.26 2.03 1.96 2.06 2.46 2.20 2.38   Notes postop  1x hold misdose         recv'd 8/28     Date 9/24 10/22 10/29 12/1 12/2 12/7 12/14 1/5/21 2/4 3/7 4/15 4/27 5/12   Total WeeklyDose 62.5mg 62.5mg 62.5mg 62.5mg 62.5mg 55mg 62.5mg 62.5mg 62.5mg 62.5mg 62.5mg 60mg 62.5mg   INR 2.27 2.23 2.10 6.90 3.97 2.52 2.22 2.94 2.46 2.68 2.50 2.31 2.58   Notes  Bactrim; clinda  ??? Lab error?  1x miss recv'd 12/15  recv'd 2/8    1x miss; recv'd 5/13     Date 5/18 6/2 6/11 6/21            Total WeeklyDose 62.5mg 52.5mg 66.25  mg             INR 2.06 2.01 3.26 2.25            Notes  1x miss 1x incr dose               * takes warfarin in AM    Phone Interview:  Tablet Strength: 7.5mg and 10mg tablet  Patient Contact Info: [phone] (mobile); [phone] (home)  Verbal Release Auth:   Lab Contact Info: EDUARDO Gonzalez [phone]; fax [phone]    UNABLE TO GET IN CONTACT WITH THE PATIENT. PLEASE DISREGARD THE FOLLOWING PLAN UNTIL ABLE TO GET IN CONTACT WITH PATIENT / PATIENT REPRESENTATIVE.    LVM 06/21/21 at 11:39 EDT      Plan:  1. INR is therapeutic and back WNL today at 2.25. Instructed Mr. Brennan to continue current maintenance dose of warfarin 10mg oral daily except 7.5mg on MonWedFri until recheck.  2. Repeat INR in 3 weeks, 7/12  3. Verbal information provided over the phone. Titobennie Brennan RBV dosing instructions,  expresses understanding by teach back, and has no further questions at this time.    Thank you,    Nato WillisD, CARLYN  6/21/2021  11:18 EDT

## 2021-06-21 NOTE — PROGRESS NOTES
Anticoagulation Clinic - Remote Progress Note  Remote Lab    Indication: paroxysmal afib   Referring Provider: Ursula  Initial Warfarin Start Date: 18-19 years ago (2001)  Goal INR: 2.0-3.0  Current Drug Interactions: pantaprozole (moderate)  CHADS-VASc: 3 (HF, HTN)    Diet: no leafy greens 6/21/21  Alcohol:  no  Tobacco: no  OTC Pain Medication: occationally tylenol for sinus HA    INR History:  Date 3/2/20 3/10 3/16 3/24 4/1 4/10 4/24 5/15 6/11 6/25 7/9 7/30 8/27   Total WeeklyDose 45 mg / 5 days 70mg 57.5mg 60mg 57.5mg 60mg 60mg 60mg 60mg 62.5mg 62.5mg 62.5mg 62.5mg   INR 1.84 4.02 3.0 3.28 2.38 2.71 2.26 2.03 1.96 2.06 2.46 2.20 2.38   Notes postop  1x hold misdose         recv'd 8/28     Date 9/24 10/22 10/29 12/1 12/2 12/7 12/14 1/5/21 2/4 3/7 4/15 4/27 5/12   Total WeeklyDose 62.5mg 62.5mg 62.5mg 62.5mg 62.5mg 55mg 62.5mg 62.5mg 62.5mg 62.5mg 62.5mg 60mg 62.5mg   INR 2.27 2.23 2.10 6.90 3.97 2.52 2.22 2.94 2.46 2.68 2.50 2.31 2.58   Notes  Bactrim; clinda  ??? Lab error?  1x miss recv'd 12/15  recv'd 2/8    1x miss; recv'd 5/13     Date 5/18 6/2 6/11 6/21            Total WeeklyDose 62.5mg 52.5mg 66.25  mg 62.5 mg            INR 2.06 2.01 3.26 2.25            Notes  1x miss 1x incr dose               * takes warfarin in AM    Phone Interview:  Tablet Strength: 7.5mg and 10mg tablet  Patient Contact Info: [phone] (mobile); [phone] (home)  Verbal Release Auth:   Lab Contact Info: EDUARDO Gonzalez [phone]; fax [phone]    Patient Findings:  Positives:  Change in diet/appetite   Negatives:  Signs/symptoms of thrombosis, Signs/symptoms of bleeding, Laboratory test error suspected, Change in health, Change in alcohol use, Change in activity, Upcoming invasive procedure, Emergency department visit, Upcoming dental procedure, Missed doses, Extra doses, Change in medications, Hospital admission, Bruising, Other complaints   Comments:  Had a serving of broccoli about a week ago. Otherwise, denies  any changes.     Plan:  1. INR is back WNL today at 2.25. Instructed Mr. Brennan to continue current maintenance dose of warfarin 10 mg oral daily except 7.5 mg on MonWedFri until recheck.  2. Repeat INR in ~ two weeks. Patient declines coming to clinic on 6/28.  3. Verbal information provided over the phone. Titobennie Brennan RBV dosing instructions, expresses understanding by teach back, and has no further questions at this time.    Heather Light CPhT  6/21/2021  14:50 EDT     I, Nato Brown, PharmD, have reviewed the note in full and agree with the assessment and plan.  06/21/21  16:12 EDT

## 2021-06-25 DIAGNOSIS — I48.19 ATRIAL FIBRILLATION, PERSISTENT (HCC): Primary | ICD-10-CM

## 2021-06-25 RX ORDER — WARFARIN SODIUM 10 MG/1
10 TABLET ORAL DAILY
Qty: 90 TABLET | Refills: 0 | Status: SHIPPED | OUTPATIENT
Start: 2021-06-25 | End: 2021-10-12 | Stop reason: SDUPTHER

## 2021-06-25 NOTE — TELEPHONE ENCOUNTER
Calls requesting refills on the warfarin 10 mg; he has warfarin 7.5 mg. He has an appointment with cardiology on 6/28/21. Sending 90# to [facility] in [place], [place]

## 2021-06-28 ENCOUNTER — OFFICE VISIT (OUTPATIENT)
Dept: CARDIOLOGY | Facility: CLINIC | Age: 61
End: 2021-06-28

## 2021-06-28 VITALS
HEART RATE: 102 BPM | HEIGHT: 73 IN | BODY MASS INDEX: 36.58 KG/M2 | OXYGEN SATURATION: 98 % | SYSTOLIC BLOOD PRESSURE: 122 MMHG | DIASTOLIC BLOOD PRESSURE: 78 MMHG | WEIGHT: 276 LBS

## 2021-06-28 DIAGNOSIS — I48.19 ATRIAL FIBRILLATION, PERSISTENT (HCC): Primary | ICD-10-CM

## 2021-06-28 DIAGNOSIS — I10 ESSENTIAL HYPERTENSION: ICD-10-CM

## 2021-06-28 DIAGNOSIS — I42.8 NICM (NONISCHEMIC CARDIOMYOPATHY) (HCC): ICD-10-CM

## 2021-06-28 PROCEDURE — 99213 OFFICE O/P EST LOW 20 MIN: CPT | Performed by: PHYSICIAN ASSISTANT

## 2021-06-28 PROCEDURE — 93000 ELECTROCARDIOGRAM COMPLETE: CPT | Performed by: PHYSICIAN ASSISTANT

## 2021-06-28 RX ORDER — METOPROLOL SUCCINATE 100 MG/1
100 TABLET, EXTENDED RELEASE ORAL 2 TIMES DAILY
Qty: 60 TABLET | Refills: 6 | Status: SHIPPED | OUTPATIENT
Start: 2021-06-28 | End: 2022-03-16 | Stop reason: SDUPTHER

## 2021-06-28 RX ORDER — SILDENAFIL 100 MG/1
100 TABLET, FILM COATED ORAL DAILY PRN
COMMUNITY

## 2021-06-28 RX ORDER — SACUBITRIL AND VALSARTAN 49; 51 MG/1; MG/1
1 TABLET, FILM COATED ORAL 2 TIMES DAILY
Qty: 60 TABLET | Refills: 6 | Status: SHIPPED | OUTPATIENT
Start: 2021-06-28 | End: 2022-03-16 | Stop reason: SDUPTHER

## 2021-06-28 NOTE — PROGRESS NOTES
[facility] at [facility]   OFFICE NOTE      Tito Brennan  1960  PCP: Gabe Mims MD    SUBJECTIVE:   Tito Brennan is a 61 y.o. male seen for a follow up visit regarding the following:     CC:Afib    HPI:   61-year-old gent returns for follow-up regarding permanent atrial fibrillation, cardiomyopathy, and hypertension. Since we last saw the pt, pt denies any symptomatic AF episodes, SOB, CP, LH, and dizziness. Denies any hospitalizations, ER visits, bleeding, or TIA/CVA symptoms. Overall feels well.  He states he is tracks his heart rate at home and is usually always in the 70s and 80s.  He states is very to see his heart rate above 90.    Cardiac PMH: (Old records have been reviewed and summarized below)  1. Persistent atrial fibrillation  a. CHADSVASc = 3, on warfarin  b. Diagnosed ~2000 at time of colonoscopy, asymptomatic  c. Admitted for Tikosyn initiation, 06/2019, failed ECV x 3, ERAF after ICV, Tikosyn discontinued  2. NICM  a. Echocardiogram 12/28/2018: EF 40%, LA size 5.2 cm in atrial fibrillation during exam  b. Echo 10/10/2019 LVEF 40%, Mild MR  3. Coronary artery disease  a. Nuclear stress test 2/13/2019: Moderate size, moderate intensity anteroseptal wall reversible defect suggestive of ischemia, EF 37%  b. Left heart catheterization 3/6/2019: Nonobstructive, noncritical coronary artery disease to the distal RCA, 20-30%, EF 35-40%  c. Echo 10/10/2019 LVEF 40%, Mild MR  4. Diabetes mellitus type II  5. Hypertension       Past Medical History, Past Surgical History, Family history, Social History, and Medications were all reviewed with the patient today and updated as necessary.       Current Outpatient Medications:   •  cetirizine (zyrTEC) 10 MG tablet, Take 10 mg by mouth Daily., Disp: , Rfl:   •  Magnesium Oxide 400 (240 Mg) MG tablet, Take 1 tablet by mouth Daily., Disp: 30 tablet, Rfl: 11  •  metFORMIN (GLUCOPHAGE) 1000 MG tablet, Take 1,000 mg by mouth 2 (Two)  Times a Day With Meals., Disp: , Rfl:   •  metoprolol succinate XL (TOPROL-XL) 100 MG 24 hr tablet, Take 1 tablet by mouth 2 (two) times a day., Disp: 60 tablet, Rfl: 6  •  pantoprazole (PROTONIX) 40 MG EC tablet, Take 1 tablet by mouth Daily., Disp: 30 tablet, Rfl: 6  •  sacubitril-valsartan (Entresto) 49-51 MG tablet, Take 1 tablet by mouth 2 (Two) Times a Day., Disp: 60 tablet, Rfl: 6  •  sildenafil (VIAGRA) 100 MG tablet, Take 100 mg by mouth Daily As Needed for Erectile Dysfunction., Disp: , Rfl:   •  warfarin (COUMADIN) 10 MG tablet, Take 1 tablet by mouth Daily. Take 1 tablet by mouth daily or as directed by anticoagulation clinic., Disp: 90 tablet, Rfl: 0  •  warfarin (COUMADIN) 7.5 MG tablet, Take one tablet by mouth daily or as directed by the anticoagulation clinic, Disp: 90 tablet, Rfl: 0      No Known Allergies  Patient Active Problem List   Diagnosis   • Atrial fibrillation, persistent (CMS/HCC)   • NICM (nonischemic cardiomyopathy) (CMS/HCC)   • Essential hypertension   • Diabetes mellitus type II, controlled (CMS/HCC)   • Coronary artery disease involving native coronary artery of native heart without angina pectoris     Past Medical History:   Diagnosis Date   • Arthritis    • Atrial fibrillation (CMS/HCC)    • Diabetes mellitus (CMS/HCC)     diagnosed 2015, checks fsbg daily    • GERD (gastroesophageal reflux disease)    • Hearing loss     left ear   • History of echocardiogram    • Hypertension    • Infection     right arm, fell and received IV abx    • Wears glasses      Past Surgical History:   Procedure Laterality Date   • CARDIAC CATHETERIZATION  2019   • CARDIAC ELECTROPHYSIOLOGY PROCEDURE N/A 6/27/2019    Procedure: CARDIOVERSION;  Surgeon: Adam Vergara MD;  Location: [facility] INVASIVE LOCATION;  Service: Cardiology   • FOOT SURGERY Left    • HAND SURGERY Left      History reviewed. No pertinent family history.  Social History     Tobacco Use   • Smoking status: Never Smoker   •  "Smokeless tobacco: Never Used   Substance Use Topics   • Alcohol use: No       ROS:  Review of Symptoms:  General: no recent weight loss/gain, weakness or fatigue  Skin: no rashes, lumps, or other skin changes  HEENT: no dizziness, lightheadedness, or vision changes  Respiratory: no cough or hemoptysis  Cardiovascular: no palpitations, and tachycardia  Gastrointestinal: no black/tarry stools or diarrhea  Urinary: no change in frequency or urgency  Peripheral Vascular: no claudication or leg cramps  Musculoskeletal: no muscle or joint pain/stiffness  Psychiatric: no depression or excessive stress  Neurological: no sensory or motor loss, no syncope  Hematologic: no anemia, easy bruising or bleeding  Endocrine: no thyroid problems, nor heat or cold intolerance    PHYSICAL EXAM:    /78 (BP Location: Right arm, Patient Position: Sitting)   Pulse 102   Ht 185.4 cm (73\")   Wt 125 kg (276 lb)   SpO2 98%   BMI 36.41 kg/m²        Wt Readings from Last 5 Encounters:   06/28/21 125 kg (276 lb)   06/30/20 125 kg (275 lb)   06/03/20 128 kg (282 lb)   10/10/19 122 kg (270 lb)   10/10/19 122 kg (270 lb)       BP Readings from Last 5 Encounters:   06/28/21 122/78   06/03/20 120/78   10/10/19 142/74   08/27/19 114/78   06/28/19 113/76       General appearance - Alert, well appearing, and in no distress   Mental status - Affect appropriate to mood.  Eyes - Sclerae anicteric,  ENMT - Hearing grossly normal bilaterally, Dental hygiene good.  Neck - Carotids upstroke normal bilaterally, no bruits, no JVD.  Resp - Clear to auscultation, no wheezes, rales or rhonchi, symmetric air entry.  Heart - Normal rate, regular rhythm, normal S1, S2, no murmurs, rubs, clicks or gallops.  GI - Soft, nontender, nondistended, no masses or organomegaly.  Neurological - Grossly intact - normal speech, no focal findings  Musculoskeletal - No joint tenderness, deformity or swelling, no muscular tenderness noted.  Extremities - Peripheral pulses " normal, no pedal edema, no clubbing or cyanosis.  Skin - Normal coloration and turgor.  Psych -  oriented to person, place, and time.    Medical problems and test results were reviewed with the patient today.     Recent Results (from the past 672 hour(s))   Protime-INR    Collection Time: 06/02/21 12:00 AM    Specimen: Blood   Result Value Ref Range    INR 2.01    Protime-INR    Collection Time: 06/11/21 12:00 AM    Specimen: Blood   Result Value Ref Range    INR 3.26    Protime-INR    Collection Time: 06/21/21 12:00 AM    Specimen: Blood   Result Value Ref Range    INR 2.25        Echocardiogram 2019  · Mild to moderate biatrial enlargement.  · The left ventricular cavity is borderline dilated.  · Left ventricular systolic function is moderately decreased. Estimated EF = 40%.  · Mild mitral valve regurgitation is present  · Trace tricuspid regurgitation, normal RVSP.    EKG: (EKG has been independently visualized by me and summarized below)    ECG 12 Lead    Date/Time: 6/28/2021 11:45 AM  Performed by: Paramjit Welch PA  Authorized by: Paramjit Welch PA   Rhythm: atrial fibrillation  Rate: tachycardic  Conduction: conduction normal  ST Segments: ST segments normal  QRS axis: normal  Other: no other findings    Clinical impression: abnormal EKG              ASSESSMENT   1. Permanent Afib: HR >100today.   2. NICM: EF 40% Echo 2019. Entresto 49/51 BID  3. HTN: Controlled on BB, Entresto  4. Anticoagulation: Chadsvasc=4    PLAN  · Continue to focus on rate control, we again asked him to diligently check his heart rate as he states is always less than 90 bpm.  We discussed that his heart rate is above 100 bpm and regular basis we would increase his Toprol.  For now we will give him extra dose of Lopressor as needed.  · Return for follow-up in 8 months or sooner as needed      6/28/2021  11:41 EDT  Advance Care Planning   ACP discussion was held with the patient during this visit. Patient does not have an  advance directive, declines further assistance.   Will Rebekah HERNANDEZ

## 2021-07-06 ENCOUNTER — ANTICOAGULATION VISIT (OUTPATIENT)
Dept: PHARMACY | Facility: HOSPITAL | Age: 61
End: 2021-07-06

## 2021-07-07 LAB — INR PPP: 2.79

## 2021-07-07 NOTE — PROGRESS NOTES
Anticoagulation Clinic - Remote Progress Note  Remote Lab    Indication: paroxysmal afib   Referring Provider: Ursula  Initial Warfarin Start Date: 18-19 years ago (2001)  Goal INR: 2.0-3.0  Current Drug Interactions: pantaprozole (moderate)  CHADS-VASc: 3 (HF, HTN)    Diet: no leafy greens 7/7/21  Alcohol:  no  Tobacco: no  OTC Pain Medication: occationally tylenol for sinus HA    INR History:  Date 3/2/20 3/10 3/16 3/24 4/1 4/10 4/24 5/15 6/11 6/25 7/9 7/30 8/27   Total WeeklyDose 45 mg / 5 days 70mg 57.5mg 60mg 57.5mg 60mg 60mg 60mg 60mg 62.5mg 62.5mg 62.5mg 62.5mg   INR 1.84 4.02 3.0 3.28 2.38 2.71 2.26 2.03 1.96 2.06 2.46 2.20 2.38   Notes postop  1x hold misdose         recv'd 8/28     Date 9/24 10/22 10/29 12/1 12/2 12/7 12/14 1/5/21 2/4 3/7 4/15 4/27 5/12   Total WeeklyDose 62.5mg 62.5mg 62.5mg 62.5mg 62.5mg 55mg 62.5mg 62.5mg 62.5mg 62.5mg 62.5mg 60mg 62.5mg   INR 2.27 2.23 2.10 6.90 3.97 2.52 2.22 2.94 2.46 2.68 2.50 2.31 2.58   Notes  Bactrim; clinda  ??? Lab error?  1x miss recv'd 12/15  recv'd 2/8    1x miss; recv'd 5/13     Date 5/18 6/2 6/11 6/21 7/6           Total WeeklyDose 62.5mg 52.5mg 66.25  mg 62.5 mg 62.5 mg           INR 2.06 2.01 3.26 2.25 2.79           Notes  1x miss 1x incr dose               * takes warfarin in AM    Phone Interview:  Tablet Strength: 7.5mg and 10mg tablet  Patient Contact Info: [phone] (mobile); [phone] (home)  Verbal Release Auth:   Lab Contact Info: EDUARDO Gonzalez [phone]; fax [phone]    Patient Findings:  Negatives:  Signs/symptoms of thrombosis, Signs/symptoms of bleeding, Laboratory test error suspected, Change in health, Change in alcohol use, Change in activity, Upcoming invasive procedure, Emergency department visit, Upcoming dental procedure, Missed doses, Extra doses, Change in medications, Change in diet/appetite, Hospital admission, Bruising, Other complaints     Plan:  1. INR was therapeutic yesterday at 2.79. Results received today.  Instructed Mr. Brennan to continue current maintenance dose of warfarin 10 mg oral daily except 7.5 mg on MonWedFri until recheck.  2. Repeat INR in 4 weeks, 8/3.  3. Verbal information provided over the phone. Tito Brennan RBV dosing instructions, expresses understanding by teach back, and has no further questions at this time.    Heather Light, Tommy  7/7/2021  08:39 EDT     I, Andrew Karimi, PharmD, have reviewed the note in full and agree with the assessment and plan.  07/07/21  09:26 EDT

## 2021-07-19 ENCOUNTER — TELEPHONE (OUTPATIENT)
Dept: PHARMACY | Facility: HOSPITAL | Age: 61
End: 2021-07-19

## 2021-07-19 NOTE — TELEPHONE ENCOUNTER
Mr. Brennan called to report that Dr. Huerta from [place], [place] [phone] will put an injection in his left shoulder on 7/27/2021. He reports that he was instructed to hold warfarin 3 days prior to procedure, however, OK to  resume warfarin on 7/27/2021 in the evening. 7/27/2021 11.25mg and 10mg on Wednesday.    He will keep the same 8/3 as repeat INR date.    Called and LVM for Dr. Huerta's office. Will send to cardiology upon confirmation

## 2021-07-21 NOTE — TELEPHONE ENCOUNTER
----- Message from Jaden Walker DO sent at 4/22/2018 12:27 PM EDT -----  Notes Recorded by Jaden Walker DO on 4/22/2018 at 12:25 PM EDT  Noted that he has an elevated white cell count  Expected with recent trauma and fracture  When he sees me or Tiffany  next he should have it repeated    Please send his preadmission eval to Dr. Cruz Peters and also [facility] preop    Thank you.  Jaden Walker   Dr. Vergara,    Mr. Brennan is going to undergo an injection into his left shoulder on 7/27/2021 with Dr. Huerta ([phone]). Dr. Huerta requests that Mr. Brennan hold his warfarin 3 days prior to the injection.     Tito Brennan is a 61 y.o. male on warfarin for Afib [id number]= 3. Will not plan to bridge with lovenox unless otherwise instructed to.    7/24/2021: Hold warfarin 3 days prior.  7/27/2021: procedure; resume warfarin boosted dose tonight following procedure.  7/28/2021: warfarin with boosted dose tonight  7/29/2021: resume prior maintenance dose  Repeat INR on 8/2/2021.    Please advise if you are agreeable to plan above or if you prefer an alternative approach to Tito Brennan's anticoagulation plan for the upcoming procedure.    Thank you,    Leyla Saba, PharmD    [facility] Anticoagulation Team  (t) [phone]  (f) [phone]

## 2021-08-03 ENCOUNTER — ANTICOAGULATION VISIT (OUTPATIENT)
Dept: PHARMACY | Facility: HOSPITAL | Age: 61
End: 2021-08-03

## 2021-08-03 LAB — INR PPP: 1.22

## 2021-08-03 NOTE — PROGRESS NOTES
Anticoagulation Clinic - Remote Progress Note  Remote Lab    Indication: paroxysmal afib   Referring Provider: Ursula  Initial Warfarin Start Date: 18-19 years ago (2001)  Goal INR: 2.0-3.0  Current Drug Interactions: pantaprozole (moderate)  CHADS-VASc: 3 (HF, HTN)    Diet: no leafy greens 7/7/21  Alcohol:  no  Tobacco: no  OTC Pain Medication: occationally tylenol for sinus HA    INR History:  Date 3/2/20 3/10 3/16 3/24 4/1 4/10 4/24 5/15 6/11 6/25 7/9 7/30 8/27   Total WeeklyDose 45 mg / 5 days 70mg 57.5mg 60mg 57.5mg 60mg 60mg 60mg 60mg 62.5mg 62.5mg 62.5mg 62.5mg   INR 1.84 4.02 3.0 3.28 2.38 2.71 2.26 2.03 1.96 2.06 2.46 2.20 2.38   Notes postop  1x hold misdose         recv'd 8/28     Date 9/24 10/22 10/29 12/1 12/2 12/7 12/14 1/5/21 2/4 3/7 4/15 4/27 5/12   Total WeeklyDose 62.5mg 62.5mg 62.5mg 62.5mg 62.5mg 55mg 62.5mg 62.5mg 62.5mg 62.5mg 62.5mg 60mg 62.5mg   INR 2.27 2.23 2.10 6.90 3.97 2.52 2.22 2.94 2.46 2.68 2.50 2.31 2.58   Notes  Bactrim; clinda  ??? Lab error?  1x miss recv'd 12/15  recv'd 2/8    1x miss; recv'd 5/13     Date 5/18 6/2 6/11 6/21 7/6 8/3          Total WeeklyDose 62.5mg 52.5mg 66.25  mg 62.5 mg 62.5 mg 58.75 mg          INR 2.06 2.01 3.26 2.25 2.79 1.22          Notes  1x miss 1x incr dose   Hold x3 boostx1            * takes warfarin in AM    Phone Interview:  Tablet Strength: 7.5mg and 10mg tablet  Patient Contact Info: [phone] (mobile); [phone] (home)  Verbal Release Auth:   Lab Contact Info: EDUARDO Carlos [phone]; fax [phone]    Patient Findings  Positives:  Upcoming invasive procedure   Negatives:  Signs/symptoms of thrombosis, Signs/symptoms of bleeding, Laboratory test error suspected, Change in health, Change in alcohol use, Change in activity, Emergency department visit, Upcoming dental procedure, Missed doses, Extra doses, Change in medications, Change in diet/appetite, Hospital admission, Bruising, Other complaints   Comments:  He stated that his  procedure went well.   He denies any missed doses post procedure.  He stated that he took his Monday dose early Tuesday morning (updated tracker.)  Would not have expected his INR to be as low as 1.22 today post procedure.  He will be undergoing another shoulder injection with Dr. Aviles ([place]) on 9/7. Will work on ivent   Otherwise, above findings negative.     Plan:  1. INR is SUB therapeutic today at 1.22.  Was unable to reach Mr. Brennan until 8/4. Instructed Mr. Brennan to boost warfarin 10 mg tonight, then tomorrow resume current maintenance dose of warfarin 10 mg oral daily except 7.5 mg on MonWedFri until recheck.  2. Repeat INR on Friday, 8/6 to evaluate dosing needs.  3. Verbal information provided over the phone. Tito Brennan RBV dosing instructions, expresses understanding by teach back, and has no further questions at this time.    Bing Flynn, PharmD  8/3/2021  15:44 EDT

## 2021-08-06 ENCOUNTER — ANTICOAGULATION VISIT (OUTPATIENT)
Dept: PHARMACY | Facility: HOSPITAL | Age: 61
End: 2021-08-06

## 2021-08-06 LAB — INR PPP: 6.07

## 2021-08-06 NOTE — PROGRESS NOTES
Anticoagulation Clinic - Remote Progress Note  Remote Lab    Indication: paroxysmal afib   Referring Provider: Ursula  Initial Warfarin Start Date: 18-19 years ago (2001)  Goal INR: 2.0-3.0  Current Drug Interactions: pantaprozole (moderate)  CHADS-VASc: 3 (HF, HTN)    Diet: no leafy greens 7/7/21  Alcohol:  no  Tobacco: no  OTC Pain Medication: occationally tylenol for sinus HA    INR History:  Date 3/2/20 3/10 3/16 3/24 4/1 4/10 4/24 5/15 6/11 6/25 7/9 7/30 8/27   Total WeeklyDose 45 mg / 5 days 70mg 57.5mg 60mg 57.5mg 60mg 60mg 60mg 60mg 62.5mg 62.5mg 62.5mg 62.5mg   INR 1.84 4.02 3.0 3.28 2.38 2.71 2.26 2.03 1.96 2.06 2.46 2.20 2.38   Notes postop  1x hold misdose         recv'd 8/28     Date 9/24 10/22 10/29 12/1 12/2 12/7 12/14 1/5/21 2/4 3/7 4/15 4/27 5/12   Total WeeklyDose 62.5mg 62.5mg 62.5mg 62.5mg 62.5mg 55mg 62.5mg 62.5mg 62.5mg 62.5mg 62.5mg 60mg 62.5mg   INR 2.27 2.23 2.10 6.90 3.97 2.52 2.22 2.94 2.46 2.68 2.50 2.31 2.58   Notes  Bactrim; clinda  ??? Lab error?  1x miss recv'd 12/15  recv'd 2/8    1x miss; recv'd 5/13     Date 5/18 6/2 6/11 6/21 7/6 8/3 8/6         Total WeeklyDose 62.5mg 52.5mg 66.25  mg 62.5 mg 62.5 mg 58.75 mg 65mg         INR 2.06 2.01 3.26 2.25 2.79 1.22 6.07         Notes  1x miss 1x incr dose   Hold x3 boostx1 2x boosted dose           * takes warfarin in AM    Phone Interview:  Tablet Strength: 7.5mg and 10mg tablet  Patient Contact Info: [phone] (mobile); [phone] (home)  Verbal Release Auth:   Lab Contact Info: EDUARDO Gonzalez [phone]; fax [phone]    Patient Findings:     Negatives:  Signs/symptoms of thrombosis, Signs/symptoms of bleeding, Laboratory test error suspected, Change in health, Change in alcohol use, Change in activity, Upcoming invasive procedure, Emergency department visit, Upcoming dental procedure, Missed doses, Extra doses, Change in medications, Change in diet/appetite, Hospital admission, Bruising, Other complaints   Comments:   Patient states that he got a cortisone injection in his shoulder on 7/27, but has not had any medication changes or anything since. States that he has not started or stopped any medications and has had no change in diet. Eats green beans like normal and no other GLV. Denies bleeding or bruising.      Plan:  1. INR is SUPRAtherapeutic today at 6.07, significant increase from last INR on 8/3 which was 1.22 (Goal 2.0-3.0). Instructed Mr. Brennan to hold dose of warfarin tonight and tomorrow, then resume current maintenance dose of warfarin 10 mg on Sunday until recheck. Emphasized for patient to monitor for signs of bleeding and bruising over the weekend and seek urgent care if any occurs. Although patient normally takes dose in AM, I instructed him not to take a dose on Monday until he hears from us with further instructions. Patient verified understanding to the above.   2. Repeat INR on Monday, 8/9 to evaluate dosing needs.  3. Verbal information provided over the phone. Titobennie Brennan RBV dosing instructions, expresses understanding by teach back, and has no further questions at this time.    Taylor Riedel, PharmD, [facility]  Pharmacy Resident  8/6/2021  13:18 EDT

## 2021-08-09 ENCOUNTER — ANTICOAGULATION VISIT (OUTPATIENT)
Dept: PHARMACY | Facility: HOSPITAL | Age: 61
End: 2021-08-09

## 2021-08-09 LAB — INR PPP: 1.23

## 2021-08-09 NOTE — PROGRESS NOTES
Anticoagulation Clinic - Remote Progress Note  Remote Lab    Indication: paroxysmal afib   Referring Provider: Ursula  Initial Warfarin Start Date: 18-19 years ago (2001)  Goal INR: 2.0-3.0  Current Drug Interactions: pantaprozole (moderate)  CHADS-VASc: 3 (HF, HTN)    Diet: no leafy greens 8/9/21  Alcohol:  no  Tobacco: no  OTC Pain Medication: occationally tylenol for sinus HA    INR History:  Date 3/2/20 3/10 3/16 3/24 4/1 4/10 4/24 5/15 6/11 6/25 7/9 7/30 8/27   Total WeeklyDose 45 mg / 5 days 70mg 57.5mg 60mg 57.5mg 60mg 60mg 60mg 60mg 62.5mg 62.5mg 62.5mg 62.5mg   INR 1.84 4.02 3.0 3.28 2.38 2.71 2.26 2.03 1.96 2.06 2.46 2.20 2.38   Notes postop  1x hold misdose         recv'd 8/28     Date 9/24 10/22 10/29 12/1 12/2 12/7 12/14 1/5/21 2/4 3/7 4/15 4/27 5/12   Total WeeklyDose 62.5mg 62.5mg 62.5mg 62.5mg 62.5mg 55mg 62.5mg 62.5mg 62.5mg 62.5mg 62.5mg 60mg 62.5mg   INR 2.27 2.23 2.10 6.90 3.97 2.52 2.22 2.94 2.46 2.68 2.50 2.31 2.58   Notes  Bactrim; clinda  ??? Lab error?  1x miss recv'd 12/15  recv'd 2/8    1x miss; recv'd 5/13     Date 5/18 6/2 6/11 6/21 7/6 8/3 8/6 8/9        Total WeeklyDose 62.5mg 52.5mg 66.25  mg 62.5 mg 62.5 mg 58.75 mg 65mg 47.5mg        INR 2.06 2.01 3.26 2.25 2.79 1.22 6.07 1.23        Notes  1x miss 1x incr dose   Hold x3 boostx1 2x boosted dose Held x 2        * takes warfarin in AM    Phone Interview:  Tablet Strength: 7.5mg and 10mg tablet  Patient Contact Info: [phone] (mobile); [phone] (home)  Verbal Release Auth:   Lab Contact Info: EDUARDO Carlos [phone]; fax [phone]    Patient Findings  Negatives:  Signs/symptoms of thrombosis, Signs/symptoms of bleeding, Laboratory test error suspected, Change in health, Change in alcohol use, Change in activity, Upcoming invasive procedure, Emergency department visit, Upcoming dental procedure, Missed doses, Extra doses, Change in medications, Change in diet/appetite, Hospital admission, Bruising, Other complaints    Comments:  No swelling or trouble breathing. He confirmed dosing. No further dose adjustments. He continues all medications. He reports all patient findings to be negative upon interview.     Plan:  1. INR is baseline today following supratherapeutic INR with 2x boosted dose (Goal 2.0-3.0). Instructed Mr. Brennan to boost dose to 10mg today (4% increase of weekly dose) then continue maintenance dose of warfarin 10 mg daily except 7.5mg MonWedFri. Emphasized for patient to monitor for signs of bleeding and bruising or concerns for clotting and seek urgent care if any occurs. Although patient normally takes dose in AM, I instructed him not to take a dose on Monday until he hears from us with further instructions. Patient verified understanding to the above.   2. Repeat INR on Monday, 8/16.  3. Verbal information provided over the phone. Tito Brennan RBV dosing instructions, expresses understanding by teach back, and has no further questions at this time.    Leyla Saba, PharmD  8/9/2021  10:50 EDT

## 2021-08-16 ENCOUNTER — ANTICOAGULATION VISIT (OUTPATIENT)
Dept: PHARMACY | Facility: HOSPITAL | Age: 61
End: 2021-08-16

## 2021-08-16 LAB — INR PPP: 3.39

## 2021-08-16 NOTE — PROGRESS NOTES
Anticoagulation Clinic - Remote Progress Note  Remote Lab    Indication: paroxysmal afib   Referring Provider: Ursula  Initial Warfarin Start Date: 18-19 years ago (2001)  Goal INR: 2.0-3.0  Current Drug Interactions: pantaprozole (moderate)  CHADS-VASc: 3 (HF, HTN)    Diet: no leafy greens 8/9/21  Alcohol:  no  Tobacco: no  OTC Pain Medication: occationally tylenol for sinus HA    INR History:  Date 3/2/20 3/10 3/16 3/24 4/1 4/10 4/24 5/15 6/11 6/25 7/9 7/30 8/27   Total WeeklyDose 45 mg / 5 days 70mg 57.5mg 60mg 57.5mg 60mg 60mg 60mg 60mg 62.5mg 62.5mg 62.5mg 62.5mg   INR 1.84 4.02 3.0 3.28 2.38 2.71 2.26 2.03 1.96 2.06 2.46 2.20 2.38   Notes postop  1x hold misdose         recv'd 8/28     Date 9/24 10/22 10/29 12/1 12/2 12/7 12/14 1/5/21 2/4 3/7 4/15 4/27 5/12   Total WeeklyDose 62.5mg 62.5mg 62.5mg 62.5mg 62.5mg 55mg 62.5mg 62.5mg 62.5mg 62.5mg 62.5mg 60mg 62.5mg   INR 2.27 2.23 2.10 6.90 3.97 2.52 2.22 2.94 2.46 2.68 2.50 2.31 2.58   Notes  Bactrim; clinda  ??? Lab error?  1x miss recv'd 12/15  recv'd 2/8    1x miss; recv'd 5/13     Date 5/18 6/2 6/11 6/21 7/6 8/3 8/6 8/9 8/16       Total WeeklyDose 62.5mg 52.5mg 66.25  mg 62.5 mg 62.5 mg 58.75 mg 65mg 47.5mg 65mg       INR 2.06 2.01 3.26 2.25 2.79 1.22 6.07 1.23 3.39       Notes  1x miss 1x incr dose   Hold x3 boostx1 2x boosted dose Held x 2        * takes warfarin in AM    Phone Interview:  Tablet Strength: 7.5mg and 10mg tablet  Patient Contact Info: [phone] (mobile); [phone] (home)  Verbal Release Auth:   Lab Contact Info: EDUARDO Gonzalez [phone]; fax [phone]    Patient Findings    Negatives:  Signs/symptoms of thrombosis, Signs/symptoms of bleeding, Laboratory test error suspected, Change in health, Change in alcohol use, Change in activity, Upcoming invasive procedure, Emergency department visit, Upcoming dental procedure, Missed doses, Extra doses, Change in medications, Change in diet/appetite, Hospital admission, Bruising, Other  complaints   Comments:  Mr. Brennan reports no changes         Plan:  1. INR is supratherapeutic today at 3.39 up from 1.23 (Goal 2.0-3.0). Instructed Mr. Brennan to take 5mg today and continue maintenance dose of warfarin 10 mg daily except 7.5mg MonWedFri. Emphasized for patient to monitor for signs of bleeding and bruising or concerns for clotting and seek urgent care if any occurs.   2. Repeat INR on Monday, 8/23.  3. Verbal information provided over the phone. Tito Brennan RBV dosing instructions, expresses understanding by teach back, and has no further questions at this time.    Thank you,    Nato Brown PharmD, CARLYN, BCPS  8/16/2021  10:41 EDT

## 2021-08-19 ENCOUNTER — TELEPHONE (OUTPATIENT)
Dept: PHARMACY | Facility: HOSPITAL | Age: 61
End: 2021-08-19

## 2021-08-19 ENCOUNTER — TELEPHONE (OUTPATIENT)
Dept: CARDIOLOGY | Facility: CLINIC | Age: 61
End: 2021-08-19

## 2021-08-19 NOTE — TELEPHONE ENCOUNTER
Patient called to update you on his BP readings. Last night his BP was 101/75, so he did not take Entresto or Metoprolol. This morning his BP was 119/84. His HR has been ranging from 65-80. He is afraid that the combination of the two medications is making his BP too low.

## 2021-08-22 LAB — INR PPP: 2.36

## 2021-08-23 ENCOUNTER — ANTICOAGULATION VISIT (OUTPATIENT)
Dept: PHARMACY | Facility: HOSPITAL | Age: 61
End: 2021-08-23

## 2021-08-23 NOTE — PROGRESS NOTES
Anticoagulation Clinic - Remote Progress Note  Remote Lab    Indication: paroxysmal afib   Referring Provider: Ursula  Initial Warfarin Start Date: 18-19 years ago (2001)  Goal INR: 2.0-3.0  Current Drug Interactions: pantaprozole (moderate)  CHADS-VASc: 3 (HF, HTN)    Diet: no leafy greens 8/9/21  Alcohol:  no  Tobacco: no  OTC Pain Medication: occationally tylenol for sinus HA    INR History:  Date 3/2/20 3/10 3/16 3/24 4/1 4/10 4/24 5/15 6/11 6/25 7/9 7/30 8/27   Total WeeklyDose 45 mg / 5 days 70mg 57.5mg 60mg 57.5mg 60mg 60mg 60mg 60mg 62.5mg 62.5mg 62.5mg 62.5mg   INR 1.84 4.02 3.0 3.28 2.38 2.71 2.26 2.03 1.96 2.06 2.46 2.20 2.38   Notes postop  1x hold misdose         recv'd 8/28     Date 9/24 10/22 10/29 12/1 12/2 12/7 12/14 1/5/21 2/4 3/7 4/15 4/27 5/12   Total WeeklyDose 62.5mg 62.5mg 62.5mg 62.5mg 62.5mg 55mg 62.5mg 62.5mg 62.5mg 62.5mg 62.5mg 60mg 62.5mg   INR 2.27 2.23 2.10 6.90 3.97 2.52 2.22 2.94 2.46 2.68 2.50 2.31 2.58   Notes  Bactrim; clinda  ??? Lab error?  1x miss recv'd 12/15  recv'd 2/8    1x miss; recv'd 5/13     Date 5/18 6/2 6/11 6/21 7/6 8/3 8/6 8/9 8/16 8/22      Total WeeklyDose 62.5mg 52.5mg 66.25  mg 62.5 mg 62.5 mg 58.75 mg 65mg 47.5mg 65mg 60mg      INR 2.06 2.01 3.26 2.25 2.79 1.22 6.07 1.23 3.39 2.36      Notes  1x miss 1x incr dose   Hold x3 boostx1 2x boosted dose Held x 2  recv'd 8/23      * takes warfarin in AM    Phone Interview:  Tablet Strength: 7.5mg and 10mg tablet  Patient Contact Info: [phone] (mobile); [phone] (home)  Verbal Release Auth:   Lab Contact Info: EDUARDO Gonzalez [phone]; fax [phone]      Patient Findings    Positives:  Upcoming invasive procedure   Negatives:  Signs/symptoms of thrombosis, Signs/symptoms of bleeding, Laboratory test error suspected, Change in health, Change in alcohol use, Change in activity, Emergency department visit, Upcoming dental procedure, Missed doses, Extra doses, Change in medications, Change in  diet/appetite, Hospital admission, Bruising, Other complaints   Comments:  Mr. Brennan reports no changes. Discussed upcoming warfarin hold plan         Plan:  1. INR is therapeutic today at 2.36 (Goal 2.0-3.0). Instructed Mr. Brennan to resume maintenance dose of warfarin 10 mg daily except 7.5mg MonWedFri. Emphasized for patient to monitor for signs of bleeding and bruising or concerns for clotting and seek urgent care if any occurs.   2. Repeat INR on Monday, 8/30.  3. Verbal information provided over the phone. Tito Brennan RBV dosing instructions, expresses understanding by teach back, and has no further questions at this time.  4. Upcoming shoulder injection with Dr. Huerta. Warfarin hold plan:    9/4-6: Hold warfarin  9/7: resume warfarin x 1 boosted dose  9/13: recheck INR    Thank you,    Nato WillisD, CARLYN, BCPS  8/23/2021  10:19 EDT

## 2021-09-02 ENCOUNTER — ANTICOAGULATION VISIT (OUTPATIENT)
Dept: PHARMACY | Facility: HOSPITAL | Age: 61
End: 2021-09-02

## 2021-09-02 LAB — INR PPP: 2.08

## 2021-09-02 NOTE — PROGRESS NOTES
Anticoagulation Clinic - Remote Progress Note  Remote Lab    Indication: paroxysmal afib   Referring Provider: Ursula  Initial Warfarin Start Date: 18-19 years ago (2001)  Goal INR: 2.0-3.0  Current Drug Interactions: pantaprozole (moderate)  CHADS-VASc: 3 (HF, HTN)    Diet: no leafy greens 8/9/21  Alcohol:  no  Tobacco: no  OTC Pain Medication: occationally tylenol for sinus HA    INR History:  Date 3/2/20 3/10 3/16 3/24 4/1 4/10 4/24 5/15 6/11 6/25 7/9 7/30 8/27   Total WeeklyDose 45 mg / 5 days 70mg 57.5mg 60mg 57.5mg 60mg 60mg 60mg 60mg 62.5mg 62.5mg 62.5mg 62.5mg   INR 1.84 4.02 3.0 3.28 2.38 2.71 2.26 2.03 1.96 2.06 2.46 2.20 2.38   Notes postop  1x hold misdose         recv'd 8/28     Date 9/24 10/22 10/29 12/1 12/2 12/7 12/14 1/5/21 2/4 3/7 4/15 4/27 5/12   Total WeeklyDose 62.5mg 62.5mg 62.5mg 62.5mg 62.5mg 55mg 62.5mg 62.5mg 62.5mg 62.5mg 62.5mg 60mg 62.5mg   INR 2.27 2.23 2.10 6.90 3.97 2.52 2.22 2.94 2.46 2.68 2.50 2.31 2.58   Notes  Bactrim; clinda  ??? Lab error?  1x miss recv'd 12/15  recv'd 2/8    1x miss; recv'd 5/13     Date 5/18 6/2 6/11 6/21 7/6 8/3 8/6 8/9 8/16 8/22 9/2     Total WeeklyDose 62.5mg 52.5mg 66.25  mg 62.5 mg 62.5 mg 58.75 mg 65mg 47.5mg 65mg 60mg 60mg     INR 2.06 2.01 3.26 2.25 2.79 1.22 6.07 1.23 3.39 2.36 2.08     Notes  1x miss 1x incr dose   Hold x3 boostx1 2x boosted dose Held x 2  recv'd 8/23      * takes warfarin in AM    Phone Interview:  Tablet Strength: 7.5mg and 10mg tablet  Patient Contact Info: [phone] (mobile); [phone] (home)  Verbal Release Auth:   Lab Contact Info: EDUARDO Gonzalez [phone]; fax [phone]    Patient Findings:  Positives:  Upcoming invasive procedure   Negatives:  Signs/symptoms of thrombosis, Signs/symptoms of bleeding, Laboratory test error suspected, Change in health, Change in alcohol use, Change in activity, Emergency department visit, Upcoming dental procedure, Missed doses, Extra doses, Change in medications, Change in  diet/appetite, Hospital admission, Bruising, Other complaints   Comments:  Mr. Brennan denies any changes, missed doses, or bleeding/bruising. Upcoming shoulder injection 9/7, discussed plan with Mr. Brennan.         Plan:  1. INR is therapeutic today at 2.08 (Goal 2.0-3.0). Instructed Mr. Brennan to take warfarin as directed for procedure (see dosing below) otherwise continue maintenance dose of warfarin 10 mg daily except 7.5mg MonWedFri.   2. Repeat INR on 9/13.  3. Verbal information provided over the phone. Titobennie Brennan RBV dosing instructions, expresses understanding by teach back, and has no further questions at this time.  4. Upcoming shoulder injection with Dr. Huerta. Warfarin hold plan:    9/4-6: Hold warfarin  9/7: resume warfarin x 1 boosted dose  9/13: recheck INR    Sita Arriola, Pharmacy Intern  9/2/2021  10:24 EDT     I, Nato Brown, PharmD, have reviewed the note in full and agree with the assessment and plan.  09/02/21  15:03 EDT

## 2021-09-13 ENCOUNTER — ANTICOAGULATION VISIT (OUTPATIENT)
Dept: PHARMACY | Facility: HOSPITAL | Age: 61
End: 2021-09-13

## 2021-09-13 LAB — INR PPP: 1.63

## 2021-09-13 NOTE — PROGRESS NOTES
Anticoagulation Clinic - Remote Progress Note  Remote Lab    Indication: paroxysmal afib   Referring Provider: Ursula  Initial Warfarin Start Date: 18-19 years ago (2001)  Goal INR: 2.0-3.0  Current Drug Interactions: pantaprozole (moderate)  CHADS-VASc: 3 (HF, HTN)    Diet: no leafy greens 8/9/21  Alcohol:  no  Tobacco: no  OTC Pain Medication: occationally tylenol for sinus HA    INR History:  Date 3/2/20 3/10 3/16 3/24 4/1 4/10 4/24 5/15 6/11 6/25 7/9 7/30 8/27   Total WeeklyDose 45 mg / 5 days 70mg 57.5mg 60mg 57.5mg 60mg 60mg 60mg 60mg 62.5mg 62.5mg 62.5mg 62.5mg   INR 1.84 4.02 3.0 3.28 2.38 2.71 2.26 2.03 1.96 2.06 2.46 2.20 2.38   Notes postop  1x hold misdose         recv'd 8/28     Date 9/24 10/22 10/29 12/1 12/2 12/7 12/14 1/5/21 2/4 3/7 4/15 4/27 5/12   Total WeeklyDose 62.5mg 62.5mg 62.5mg 62.5mg 62.5mg 55mg 62.5mg 62.5mg 62.5mg 62.5mg 62.5mg 60mg 62.5mg   INR 2.27 2.23 2.10 6.90 3.97 2.52 2.22 2.94 2.46 2.68 2.50 2.31 2.58   Notes  Bactrim; clinda  ??? Lab error?  1x miss recv'd 12/15  recv'd 2/8    1x miss; recv'd 5/13     Date 5/18 6/2 6/11 6/21 7/6 8/3 8/6 8/9 8/16 8/22 9/2 9/13    Total WeeklyDose 62.5mg 52.5mg 66.25  mg 62.5 mg 62.5 mg 58.75 mg 65mg 47.5mg 65mg 60mg 60mg 60 mg    INR 2.06 2.01 3.26 2.25 2.79 1.22 6.07 1.23 3.39 2.36 2.08 1.63    Notes  1x miss 1x incr dose   Hold x3 boostx1 2x boosted dose Held x 2  recv'd 8/23  holdx3 boostx1    * takes warfarin in AM    Phone Interview:  Tablet Strength: 7.5mg and 10mg tablet  Patient Contact Info: [phone] (mobile); [phone] (home)  Verbal Release Auth:   Lab Contact Info: EDUARDO Gonzalez [phone]; fax [phone]      Positives:  Missed doses   Negatives:  Signs/symptoms of thrombosis, Signs/symptoms of bleeding, Laboratory test error suspected, Change in health, Change in alcohol use, Change in activity, Upcoming invasive procedure, Emergency department visit, Upcoming dental procedure, Extra doses, Change in medications,  Change in diet/appetite, Hospital admission, Bruising, Other complaints   Comments:  Mr. Brennan is s/p shoulder injection with Dr. Huerta.         Plan:  1. INR is SUB therapeutic today at 1.63 s/p three day hold and one boosted dose (Goal 2.0-3.0). Instructed Mr. Brennan to boost tonight's dose to 10mg then continue maintenance dose of warfarin 10 mg oral daily except 7.5mg MonWedFri.   2. Repeat INR on 9/20  3. Verbal information provided over the phone. Tito Brennan RBV dosing instructions, expresses understanding by teach back, and has no further questions at this time.    Jordana Desir, PharmD.  09/13/21   15:59 EDT

## 2021-09-20 ENCOUNTER — ANTICOAGULATION VISIT (OUTPATIENT)
Dept: PHARMACY | Facility: HOSPITAL | Age: 61
End: 2021-09-20

## 2021-09-20 LAB — INR PPP: 1.9

## 2021-09-20 NOTE — PROGRESS NOTES
Anticoagulation Clinic - Remote Progress Note  Remote Lab    Indication: paroxysmal afib   Referring Provider: Ursula  Initial Warfarin Start Date: 18-19 years ago (2001)  Goal INR: 2.0-3.0  Current Drug Interactions: pantaprozole (moderate)  CHADS-VASc: 3 (HF, HTN)    Diet: no leafy greens 8/9/21  Alcohol:  no  Tobacco: no  OTC Pain Medication: occationally tylenol for sinus HA    INR History:  Date 3/2/20 3/10 3/16 3/24 4/1 4/10 4/24 5/15 6/11 6/25 7/9 7/30 8/27   Total WeeklyDose 45 mg / 5 days 70mg 57.5mg 60mg 57.5mg 60mg 60mg 60mg 60mg 62.5mg 62.5mg 62.5mg 62.5mg   INR 1.84 4.02 3.0 3.28 2.38 2.71 2.26 2.03 1.96 2.06 2.46 2.20 2.38   Notes postop  1x hold misdose         recv'd 8/28     Date 9/24 10/22 10/29 12/1 12/2 12/7 12/14 1/5/21 2/4 3/7 4/15 4/27 5/12   Total WeeklyDose 62.5mg 62.5mg 62.5mg 62.5mg 62.5mg 55mg 62.5mg 62.5mg 62.5mg 62.5mg 62.5mg 60mg 62.5mg   INR 2.27 2.23 2.10 6.90 3.97 2.52 2.22 2.94 2.46 2.68 2.50 2.31 2.58   Notes  Bactrim; clinda  ??? Lab error?  1x miss recv'd 12/15  recv'd 2/8    1x miss; recv'd 5/13     Date 5/18 6/2 6/11 6/21 7/6 8/3 8/6 8/9 8/16 8/22 9/2 9/13 9/20   Total WeeklyDose 62.5mg 52.5mg 66.25  mg 62.5 mg 62.5 mg 58.75 mg 65mg 47.5mg 65mg 60mg 60mg 60 mg 65mg   INR 2.06 2.01 3.26 2.25 2.79 1.22 6.07 1.23 3.39 2.36 2.08 1.63 1.9   Notes  1x miss 1x incr dose   Hold x3 boostx1 2x boosted dose Held x 2  recv'd 8/23  holdx3 boostx1    * takes warfarin in AM    Phone Interview:  Tablet Strength: 7.5mg and 10mg tablet  Patient Contact Info: [phone] (mobile); [phone] (home)  Verbal Release Auth:   Lab Contact Info: EDUARDO Gonzalez [phone]; fax [phone]      Negatives:  Signs/symptoms of thrombosis, Signs/symptoms of bleeding, Laboratory test error suspected, Change in health, Change in alcohol use, Change in activity, Upcoming invasive procedure, Emergency department visit, Upcoming dental procedure, Missed doses, Extra doses, Change in medications, Change  in diet/appetite, Hospital admission, Bruising, Other complaints           Plan:  1. INR is SUB therapeutic today at 1.90 (Goal 2.0-3.0) but improved. Instructed Mr. Brennan to continue this dose and tale maintenance dose of warfarin 10 mg oral daily except 7.5mg WedFri.   2. Repeat INR on 9/27, if wNL will extend further. May need 62.5-65mg maintenance  3. Verbal information provided over the phone. Tito Brennan RBV dosing instructions, expresses understanding by teach back, and has no further questions at this time.    Jordana Desir, LazaroD.  09/20/21   10:28 EDT

## 2021-09-27 ENCOUNTER — ANTICOAGULATION VISIT (OUTPATIENT)
Dept: PHARMACY | Facility: HOSPITAL | Age: 61
End: 2021-09-27

## 2021-09-27 LAB — INR PPP: 2.4

## 2021-09-27 NOTE — PROGRESS NOTES
Anticoagulation Clinic - Remote Progress Note  Remote Lab    Indication: paroxysmal afib   Referring Provider: Ursula  Initial Warfarin Start Date: 18-19 years ago (2001)  Goal INR: 2.0-3.0  Current Drug Interactions: pantaprozole (moderate)  CHADS-VASc: 3 (HF, HTN)    Diet: no leafy greens 8/9/21  Alcohol:  no  Tobacco: no  OTC Pain Medication: occationally tylenol for sinus HA    INR History:  Date 3/2/20 3/10 3/16 3/24 4/1 4/10 4/24 5/15 6/11 6/25 7/9 7/30 8/27   Total WeeklyDose 45 mg / 5 days 70mg 57.5mg 60mg 57.5mg 60mg 60mg 60mg 60mg 62.5mg 62.5mg 62.5mg 62.5mg   INR 1.84 4.02 3.0 3.28 2.38 2.71 2.26 2.03 1.96 2.06 2.46 2.20 2.38   Notes postop  1x hold misdose         recv'd 8/28     Date 9/24 10/22 10/29 12/1 12/2 12/7 12/14 1/5/21 2/4 3/7 4/15 4/27 5/12   Total WeeklyDose 62.5mg 62.5mg 62.5mg 62.5mg 62.5mg 55mg 62.5mg 62.5mg 62.5mg 62.5mg 62.5mg 60mg 62.5mg   INR 2.27 2.23 2.10 6.90 3.97 2.52 2.22 2.94 2.46 2.68 2.50 2.31 2.58   Notes  Bactrim; clinda  ??? Lab error?  1x miss recv'd 12/15  recv'd 2/8    1x miss; recv'd 5/13     Date 5/18 6/2 6/11 6/21 7/6 8/3 8/6 8/9 8/16 8/22 9/2 9/13 9/20   Total WeeklyDose 62.5mg 52.5mg 66.25  mg 62.5 mg 62.5 mg 58.75 mg 65mg 47.5mg 65mg 60mg 60mg 60 mg 65mg   INR 2.06 2.01 3.26 2.25 2.79 1.22 6.07 1.23 3.39 2.36 2.08 1.63 1.9   Notes  1x miss 1x incr dose   Hold x3 boostx1 2x boosted dose Held x 2  recv'd 8/23  holdx3 boostx1      Date 9/27              Total WeeklyDose 65mg              INR 2.4              Notes                   * takes warfarin in AM    Phone Interview:  Tablet Strength: 7.5mg and 10mg tablet  Patient Contact Info: [phone] (mobile); [phone] (home)  Verbal Release Auth:   Lab Contact Info: EDUARDO Gonzalez [phone]; fax [phone]    Positives:  Bruising   Negatives:  Signs/symptoms of thrombosis, Signs/symptoms of bleeding, Laboratory test error suspected, Change in health, Change in alcohol use, Change in activity, Upcoming  invasive procedure, Emergency department visit, Upcoming dental procedure, Missed doses, Extra doses, Change in medications, Change in diet/appetite, Hospital admission, Other complaints           Plan:  1. INR is therapeutic today at 2.4 (Goal 2.0-3.0). Decent increase from last week.  Instructed Mr. Brennan to reduce dose to warfarin 10 mg oral daily except 7.5mg MonWedFri.   2. Repeat INR in 2 weeks  3. Verbal information provided over the phone. Tito Brennan RBV dosing instructions, expresses understanding by teach back, and has no further questions at this time.    Jordana Desir, PharmD.  09/27/21   13:11 EDT

## 2021-10-12 ENCOUNTER — ANTICOAGULATION VISIT (OUTPATIENT)
Dept: PHARMACY | Facility: HOSPITAL | Age: 61
End: 2021-10-12

## 2021-10-12 DIAGNOSIS — I48.19 ATRIAL FIBRILLATION, PERSISTENT (HCC): ICD-10-CM

## 2021-10-12 LAB — INR PPP: 2.33

## 2021-10-12 RX ORDER — WARFARIN SODIUM 10 MG/1
TABLET ORAL
Qty: 65 TABLET | Refills: 0 | Status: SHIPPED | OUTPATIENT
Start: 2021-10-12 | End: 2022-07-11 | Stop reason: SDUPTHER

## 2021-10-12 NOTE — PROGRESS NOTES
Anticoagulation Clinic - Remote Progress Note  Remote Lab    Indication: paroxysmal afib   Referring Provider: Ursula  Initial Warfarin Start Date: 18-19 years ago (2001)  Goal INR: 2.0-3.0  Current Drug Interactions: pantaprozole (moderate)  CHADS-VASc: 3 (HF, HTN)    Diet: no leafy greens 8/9/21  Alcohol:  no  Tobacco: no  OTC Pain Medication: occationally tylenol for sinus HA    INR History:  Date 3/2/20 3/10 3/16 3/24 4/1 4/10 4/24 5/15 6/11 6/25 7/9 7/30 8/27   Total WeeklyDose 45 mg / 5 days 70mg 57.5mg 60mg 57.5mg 60mg 60mg 60mg 60mg 62.5mg 62.5mg 62.5mg 62.5mg   INR 1.84 4.02 3.0 3.28 2.38 2.71 2.26 2.03 1.96 2.06 2.46 2.20 2.38   Notes postop  1x hold misdose         recv'd 8/28     Date 9/24 10/22 10/29 12/1 12/2 12/7 12/14 1/5/21 2/4 3/7 4/15 4/27 5/12   Total WeeklyDose 62.5mg 62.5mg 62.5mg 62.5mg 62.5mg 55mg 62.5mg 62.5mg 62.5mg 62.5mg 62.5mg 60mg 62.5mg   INR 2.27 2.23 2.10 6.90 3.97 2.52 2.22 2.94 2.46 2.68 2.50 2.31 2.58   Notes  Bactrim; clinda  ??? Lab error?  1x miss recv'd 12/15  recv'd 2/8    1x miss; recv'd 5/13     Date 5/18 6/2 6/11 6/21 7/6 8/3 8/6 8/9 8/16 8/22 9/2 9/13 9/20   Total WeeklyDose 62.5mg 52.5mg 66.25  mg 62.5 mg 62.5 mg 58.75 mg 65mg 47.5mg 65mg 60mg 60mg 60 mg 65mg   INR 2.06 2.01 3.26 2.25 2.79 1.22 6.07 1.23 3.39 2.36 2.08 1.63 1.9   Notes  1x miss 1x incr dose   Hold x3 boostx1 2x boosted dose Held x 2  recv'd 8/23  holdx3 boostx1      Date 9/27 10/12             Total WeeklyDose 65mg 62.5 mg             INR 2.4 2.33             Notes                 * takes warfarin in AM    Phone Interview:  Tablet Strength: 7.5mg and 10mg tablet  Patient Contact Info: [phone] (mobile); [phone] (home)  Verbal Release Auth:   Lab Contact Info: EDUARDO Gonzalez [phone]; fax [phone]    Patient Findings:  Positives:  Change in diet/appetite   Negatives:  Signs/symptoms of thrombosis, Signs/symptoms of bleeding, Laboratory test error suspected, Change in health, Change in  alcohol use, Change in activity, Upcoming invasive procedure, Emergency department visit, Upcoming dental procedure, Missed doses, Extra doses, Change in medications, Hospital admission, Bruising, Other complaints   Comments:  Had a few bites of broccoli a few days ago. Otherwise, denies any changes.     Plan:  1. INR is therapeutic today at 2.33. Instructed Mr. Brennan to continue current dose of warfarin 10 mg oral daily except 7.5 mg on MonWedFri until recheck.  2. Repeat INR in two weeks, 10/26.  3. Verbal information provided over the phone. Titobennie Brennan RBV dosing instructions, expresses understanding by teach back, and has no further questions at this time.    Heather Light CPhT  10/12/2021  10:54 EDT    I, Bing Flynn, PharmD, have reviewed the note in full and agree with the assessment and plan.  10/12/21  14:36 EDT

## 2021-10-12 NOTE — TELEPHONE ENCOUNTER
During encounter today, patient requested warfarin 10 mg be refilled. Needs to be sent to [facility] in [place], [place].

## 2021-10-25 ENCOUNTER — ANTICOAGULATION VISIT (OUTPATIENT)
Dept: PHARMACY | Facility: HOSPITAL | Age: 61
End: 2021-10-25

## 2021-10-25 LAB — INR PPP: 3.83

## 2021-10-27 NOTE — PROGRESS NOTES
Anticoagulation Clinic - Remote Progress Note  Remote Lab    Indication: paroxysmal afib   Referring Provider: Ursula  Initial Warfarin Start Date: 18-19 years ago (2001)  Goal INR: 2.0-3.0  Current Drug Interactions: pantaprozole (moderate)  CHADS-VASc: 3 (HF, HTN)    Diet: no leafy greens 8/9/21  Alcohol:  no  Tobacco: no  OTC Pain Medication: occationally tylenol for sinus HA    INR History:  Date 3/2/20 3/10 3/16 3/24 4/1 4/10 4/24 5/15 6/11 6/25 7/9 7/30 8/27   Total WeeklyDose 45 mg / 5 days 70mg 57.5mg 60mg 57.5mg 60mg 60mg 60mg 60mg 62.5mg 62.5mg 62.5mg 62.5mg   INR 1.84 4.02 3.0 3.28 2.38 2.71 2.26 2.03 1.96 2.06 2.46 2.20 2.38   Notes postop  1x hold misdose         recv'd 8/28     Date 9/24 10/22 10/29 12/1 12/2 12/7 12/14 1/5/21 2/4 3/7 4/15 4/27 5/12   Total WeeklyDose 62.5mg 62.5mg 62.5mg 62.5mg 62.5mg 55mg 62.5mg 62.5mg 62.5mg 62.5mg 62.5mg 60mg 62.5mg   INR 2.27 2.23 2.10 6.90 3.97 2.52 2.22 2.94 2.46 2.68 2.50 2.31 2.58   Notes  Bactrim; clinda  ??? Lab error?  1x miss recv'd 12/15  recv'd 2/8    1x miss; recv'd 5/13     Date 5/18 6/2 6/11 6/21 7/6 8/3 8/6 8/9 8/16 8/22 9/2 9/13 9/20   Total WeeklyDose 62.5mg 52.5mg 66.25  mg 62.5 mg 62.5 mg 58.75 mg 65mg 47.5mg 65mg 60mg 60mg 60 mg 65mg   INR 2.06 2.01 3.26 2.25 2.79 1.22 6.07 1.23 3.39 2.36 2.08 1.63 1.9   Notes  1x miss 1x incr dose   Hold x3 boostx1 2x boosted dose Held x 2  recv'd 8/23  holdx3 boostx1      Date 9/27 10/12 10/25            Total WeeklyDose 65mg 62.5 mg 62.5 mg            INR 2.4 2.33 3.83            Notes                 * takes warfarin in AM    Phone Interview:  Tablet Strength: 7.5mg and 10mg tablet  Patient Contact Info: [phone] (mobile); [phone] (home)  Verbal Release Auth:   Lab Contact Info: EDUARDO Gonzalez [phone]; fax [phone]    Patient Findings:  Negatives:  Signs/symptoms of thrombosis, Signs/symptoms of bleeding, Laboratory test error suspected, Change in health, Change in alcohol use, Change in  activity, Upcoming invasive procedure, Emergency department visit, Upcoming dental procedure, Missed doses, Extra doses, Change in medications, Change in diet/appetite, Hospital admission, Bruising, Other complaints   Comments:  Received a VM from clinic to take reduced dose yesterday- took 7.5 mg. He also had some green beans yesterday.     Plan:  1. INR was supra therapeutic on 10/25 at 3.83. Unable to reach patient until 10/27. At this time, instructed Mr. Brennan to take warfarin 7.5 mg daily except 10 mg on Sunday until recheck. He will eat an additional serving of green beans tonight as well.  2. Repeat INR in one week, 11/1.  3. Verbal information provided over the phone. Tito Brennan RBV dosing instructions, expresses understanding by teach back, and has no further questions at this time.    Heather Light, Tommy  10/27/2021  10:12 EDT      Anticipate patient will need 2-3 days of 10mg each week pending next INR result.  ILeyla, PharmD, have reviewed the note in full and agree with the assessment and plan.  10/29/21  13:24 EDT

## 2021-11-02 ENCOUNTER — ANTICOAGULATION VISIT (OUTPATIENT)
Dept: PHARMACY | Facility: HOSPITAL | Age: 61
End: 2021-11-02

## 2021-11-02 LAB — INR PPP: 2.4

## 2021-11-02 NOTE — PROGRESS NOTES
Anticoagulation Clinic - Remote Progress Note  Remote Lab    Indication: paroxysmal afib   Referring Provider: Ursula  Initial Warfarin Start Date: 18-19 years ago (2001)  Goal INR: 2.0-3.0  Current Drug Interactions: pantaprozole (moderate)  CHADS-VASc: 3 (HF, HTN)    Diet: no leafy greens 11/2/21  Alcohol:  no  Tobacco: no  OTC Pain Medication: occationally tylenol for sinus HA    INR History:  Date 3/2/20 3/10 3/16 3/24 4/1 4/10 4/24 5/15 6/11 6/25 7/9 7/30 8/27   Total WeeklyDose 45 mg / 5 days 70mg 57.5mg 60mg 57.5mg 60mg 60mg 60mg 60mg 62.5mg 62.5mg 62.5mg 62.5mg   INR 1.84 4.02 3.0 3.28 2.38 2.71 2.26 2.03 1.96 2.06 2.46 2.20 2.38   Notes postop  1x hold misdose         recv'd 8/28     Date 9/24 10/22 10/29 12/1 12/2 12/7 12/14 1/5/21 2/4 3/7 4/15 4/27 5/12   Total WeeklyDose 62.5mg 62.5mg 62.5mg 62.5mg 62.5mg 55mg 62.5mg 62.5mg 62.5mg 62.5mg 62.5mg 60mg 62.5mg   INR 2.27 2.23 2.10 6.90 3.97 2.52 2.22 2.94 2.46 2.68 2.50 2.31 2.58   Notes  Bactrim; clinda  ??? Lab error?  1x miss recv'd 12/15  recv'd 2/8    1x miss; recv'd 5/13     Date 5/18 6/2 6/11 6/21 7/6 8/3 8/6 8/9 8/16 8/22 9/2 9/13 9/20   Total WeeklyDose 62.5mg 52.5mg 66.25  mg 62.5 mg 62.5 mg 58.75 mg 65mg 47.5mg 65mg 60mg 60mg 60 mg 65mg   INR 2.06 2.01 3.26 2.25 2.79 1.22 6.07 1.23 3.39 2.36 2.08 1.63 1.9   Notes  1x miss 1x incr dose   Hold x3 boostx1 2x boosted dose Held x 2  recv'd 8/23  holdx3 boostx1      Date 9/27 10/12 10/25 11/2           Total WeeklyDose 65mg 62.5 mg 62.5 mg 55 mg           INR 2.4 2.33 3.83 2.40           Notes                 * takes warfarin in AM    Phone Interview:  Tablet Strength: 7.5mg and 10mg tablet  Patient Contact Info: [phone] (mobile); [phone] (home)  Verbal Release Auth:   Lab Contact Info: EDUARDO Gonzalez [phone]; fax [phone]    Patient Findings:  Negatives:  Signs/symptoms of thrombosis, Signs/symptoms of bleeding, Laboratory test error suspected, Change in health, Change in alcohol  use, Change in activity, Upcoming invasive procedure, Emergency department visit, Upcoming dental procedure, Missed doses, Extra doses, Change in medications, Change in diet/appetite, Hospital admission, Bruising, Other complaints     Plan:  1. INR is back WNL today at 2.40. At this time, instructed Mr. Brennan to take warfarin 7.5 mg daily except 10 mg on TuesFri until recheck next week.  2. Repeat INR on Thursday, 11/11.  3. Verbal information provided over the phone. Titobennie Brennan RBV dosing instructions, expresses understanding by teach back, and has no further questions at this time.    Heather Light CPhT  11/2/2021  08:33 EDT      I, Jordana Desir, PharmD, have reviewed the note in full and agree with the assessment and plan.  11/02/21  09:45 EDT

## 2021-11-12 ENCOUNTER — ANTICOAGULATION VISIT (OUTPATIENT)
Dept: PHARMACY | Facility: HOSPITAL | Age: 61
End: 2021-11-12

## 2021-11-12 LAB — INR PPP: 2.6

## 2021-11-12 NOTE — PROGRESS NOTES
Anticoagulation Clinic - Remote Progress Note  Remote Lab    Indication: paroxysmal afib   Referring Provider: Ursula  Initial Warfarin Start Date: 18-19 years ago (2001)  Goal INR: 2.0-3.0  Current Drug Interactions: pantaprozole (moderate)  CHADS-VASc: 3 (HF, HTN)    Diet: no leafy greens 11/2/21  Alcohol:  no  Tobacco: no  OTC Pain Medication: occationally tylenol for sinus HA    INR History:  Date 3/2/20 3/10 3/16 3/24 4/1 4/10 4/24 5/15 6/11 6/25 7/9 7/30 8/27   Total WeeklyDose 45 mg / 5 days 70mg 57.5mg 60mg 57.5mg 60mg 60mg 60mg 60mg 62.5mg 62.5mg 62.5mg 62.5mg   INR 1.84 4.02 3.0 3.28 2.38 2.71 2.26 2.03 1.96 2.06 2.46 2.20 2.38   Notes postop  1x hold misdose         recv'd 8/28     Date 9/24 10/22 10/29 12/1 12/2 12/7 12/14 1/5/21 2/4 3/7 4/15 4/27 5/12   Total WeeklyDose 62.5mg 62.5mg 62.5mg 62.5mg 62.5mg 55mg 62.5mg 62.5mg 62.5mg 62.5mg 62.5mg 60mg 62.5mg   INR 2.27 2.23 2.10 6.90 3.97 2.52 2.22 2.94 2.46 2.68 2.50 2.31 2.58   Notes  Bactrim; clinda  ??? Lab error?  1x miss recv'd 12/15  recv'd 2/8    1x miss; recv'd 5/13     Date 5/18 6/2 6/11 6/21 7/6 8/3 8/6 8/9 8/16 8/22 9/2 9/13 9/20   Total WeeklyDose 62.5mg 52.5mg 66.25  mg 62.5 mg 62.5 mg 58.75 mg 65mg 47.5mg 65mg 60mg 60mg 60mg 65mg   INR 2.06 2.01 3.26 2.25 2.79 1.22 6.07 1.23 3.39 2.36 2.08 1.63 1.9   Notes  1x miss 1x incr dose   3x hold; 1x incr dose 2x incr dose 2x hold  recv'd 8/23  3x hold; 1x incr dose      Date 9/27 10/12 10/25 11/2 11/11          Total WeeklyDose 65mg 62.5mg 62.5mg 55mg 57.5mg          INR 2.4 2.33 3.83 2.40 2.60          Notes                 * takes warfarin in AM    Phone Interview:  Tablet Strength: 7.5mg and 10mg tablet  Patient Contact Info: [phone] (mobile); [phone] (home)  Verbal Release Auth:   Lab Contact Info: EDUARDO Gonzalez [phone]; fax [phone]    Patient Findings  Negatives:  Signs/symptoms of thrombosis, Signs/symptoms of bleeding, Laboratory test error suspected, Change in health,  Change in alcohol use, Change in activity, Upcoming invasive procedure, Emergency department visit, Upcoming dental procedure, Missed doses, Extra doses, Change in medications, Change in diet/appetite, Hospital admission, Bruising, Other complaints     Plan:  1. INR was therapeutic yesterday at 2.60. Instructed Mr. Brennan to continue warfarin 7.5mg oral daily except 10mg on TuesFri until recheck  2. Repeat INR in ~1.5 weeks, 11/22  3. Verbal information provided over the phone. Tito Brennan RBV dosing instructions, expresses understanding by teach back, and has no further questions at this time.    Andrew Iqbal, Tommy  11/12/2021  08:55 EST     I, Preethi Maya, PharmD, have reviewed the note in full and agree with the assessment and plan.  11/12/21  14:12 EST

## 2021-11-23 ENCOUNTER — ANTICOAGULATION VISIT (OUTPATIENT)
Dept: PHARMACY | Facility: HOSPITAL | Age: 61
End: 2021-11-23

## 2021-11-23 LAB — INR PPP: 1.96

## 2021-11-23 NOTE — PROGRESS NOTES
Anticoagulation Clinic - Remote Progress Note  Remote Lab    Indication: paroxysmal afib   Referring Provider: Ursula  Initial Warfarin Start Date: 18-19 years ago (2001)  Goal INR: 2.0-3.0  Current Drug Interactions: pantaprozole (moderate)  CHADS-VASc: 3 (HF, HTN)    Diet: no leafy greens 11/2/21  Alcohol:  no  Tobacco: no  OTC Pain Medication: occationally tylenol for sinus HA    INR History:  Date 3/2/20 3/10 3/16 3/24 4/1 4/10 4/24 5/15 6/11 6/25 7/9 7/30 8/27   Total WeeklyDose 45 mg / 5 days 70mg 57.5mg 60mg 57.5mg 60mg 60mg 60mg 60mg 62.5mg 62.5mg 62.5mg 62.5mg   INR 1.84 4.02 3.0 3.28 2.38 2.71 2.26 2.03 1.96 2.06 2.46 2.20 2.38   Notes postop  1x hold misdose         recv'd 8/28     Date 9/24 10/22 10/29 12/1 12/2 12/7 12/14 1/5/21 2/4 3/7 4/15 4/27 5/12   Total WeeklyDose 62.5mg 62.5mg 62.5mg 62.5mg 62.5mg 55mg 62.5mg 62.5mg 62.5mg 62.5mg 62.5mg 60mg 62.5mg   INR 2.27 2.23 2.10 6.90 3.97 2.52 2.22 2.94 2.46 2.68 2.50 2.31 2.58   Notes  Bactrim; clinda  ??? Lab error?  1x miss recv'd 12/15  recv'd 2/8    1x miss; recv'd 5/13     Date 5/18 6/2 6/11 6/21 7/6 8/3 8/6 8/9 8/16 8/22 9/2 9/13 9/20   Total WeeklyDose 62.5mg 52.5mg 66.25  mg 62.5 mg 62.5 mg 58.75 mg 65mg 47.5mg 65mg 60mg 60mg 60mg 65mg   INR 2.06 2.01 3.26 2.25 2.79 1.22 6.07 1.23 3.39 2.36 2.08 1.63 1.9   Notes  1x miss 1x incr dose   3x hold; 1x incr dose 2x incr dose 2x hold  recv'd 8/23  3x hold; 1x incr dose      Date 9/27 10/12 10/25 11/2 11/11          Total WeeklyDose 65mg 62.5mg 62.5mg 55mg 57.5mg 57.5mg         INR 2.4 2.33 3.83 2.40 2.60 1.96         Notes                 * takes warfarin in AM    Phone Interview:  Tablet Strength: 7.5mg and 10mg tablet  Patient Contact Info: [phone] (mobile); [phone] (home)  Verbal Release Auth:   Lab Contact Info: EDUARDO Gonzalez [phone]; fax [phone]    Patient Findings  Negatives:  Signs/symptoms of thrombosis, Signs/symptoms of bleeding, Laboratory test error suspected, Change  in health, Change in alcohol use, Change in activity, Upcoming invasive procedure, Emergency department visit, Upcoming dental procedure, Missed doses, Extra doses, Change in medications, Change in diet/appetite, Hospital admission, Bruising, Other complaints         Plan:  1. INR was slightly SUBtherapeutic yesterday at 1.96. Instructed Mr. Brennan to continue warfarin 7.5mg oral daily except 10mg on TuesFri until recheck  2. Repeat INR in ~1.5 weeks, 12/06  3. Verbal information provided over the phone. Tito Brennan RBV dosing instructions, expresses understanding by teach back, and has no further questions at this time.    Scotty Green, PharmD  11/23/2021  16:32 EST

## 2021-12-08 ENCOUNTER — ANTICOAGULATION VISIT (OUTPATIENT)
Dept: PHARMACY | Facility: HOSPITAL | Age: 61
End: 2021-12-08

## 2021-12-08 LAB — INR PPP: 2.3

## 2021-12-08 NOTE — PROGRESS NOTES
Anticoagulation Clinic - Remote Progress Note  Remote Lab    Indication: paroxysmal afib   Referring Provider: Ursula  Initial Warfarin Start Date: 18-19 years ago (2001)  Goal INR: 2.0-3.0  Current Drug Interactions: pantaprozole (moderate)  CHADS-VASc: 3 (HF, HTN)    Diet: no leafy greens 11/2/21  Alcohol:  no  Tobacco: no  OTC Pain Medication: occationally tylenol for sinus HA    INR History:  Date 3/2/20 3/10 3/16 3/24 4/1 4/10 4/24 5/15 6/11 6/25 7/9 7/30 8/27   Total WeeklyDose 45 mg / 5 days 70mg 57.5mg 60mg 57.5mg 60mg 60mg 60mg 60mg 62.5mg 62.5mg 62.5mg 62.5mg   INR 1.84 4.02 3.0 3.28 2.38 2.71 2.26 2.03 1.96 2.06 2.46 2.20 2.38   Notes postop  1x hold misdose         recv'd 8/28     Date 9/24 10/22 10/29 12/1 12/2 12/7 12/14 1/5/21 2/4 3/7 4/15 4/27 5/12   Total WeeklyDose 62.5mg 62.5mg 62.5mg 62.5mg 62.5mg 55mg 62.5mg 62.5mg 62.5mg 62.5mg 62.5mg 60mg 62.5mg   INR 2.27 2.23 2.10 6.90 3.97 2.52 2.22 2.94 2.46 2.68 2.50 2.31 2.58   Notes  Bactrim; clinda  ??? Lab error?  1x miss recv'd 12/15  recv'd 2/8    1x miss; recv'd 5/13     Date 5/18 6/2 6/11 6/21 7/6 8/3 8/6 8/9 8/16 8/22 9/2 9/13 9/20   Total WeeklyDose 62.5mg 52.5mg 66.25  mg 62.5 mg 62.5 mg 58.75 mg 65mg 47.5mg 65mg 60mg 60mg 60mg 65mg   INR 2.06 2.01 3.26 2.25 2.79 1.22 6.07 1.23 3.39 2.36 2.08 1.63 1.9   Notes  1x miss 1x incr dose   3x hold; 1x incr dose 2x incr dose 2x hold  recv'd 8/23  3x hold; 1x incr dose      Date 9/27 10/12 10/25 11/2 11/11 11/22 12/7        Total WeeklyDose 65mg 62.5mg 62.5mg 55mg 57.5mg 57.5mg 57.5mg        INR 2.4 2.33 3.83 2.40 2.60 1.96 2.30        Notes                 * takes warfarin in AM    Phone Interview:  Tablet Strength: 7.5mg and 10mg tablet  Patient Contact Info: [phone] (mobile); [phone] (home)  Verbal Release Auth:   Lab Contact Info: EDUARDO Gonzalez [phone]; fax [phone]    Patient Findings  Negatives:  Signs/symptoms of thrombosis, Signs/symptoms of bleeding, Laboratory test error  suspected, Change in health, Change in alcohol use, Change in activity, Upcoming invasive procedure, Emergency department visit, Upcoming dental procedure, Missed doses, Extra doses, Change in medications, Change in diet/appetite, Hospital admission, Bruising, Other complaints       Plan:  1. INR is therapeutic and back WNL today at 2.30. Instructed Mr. Brennan to continue warfarin 7.5mg oral daily except 10mg on TuesFri until recheck.  2. Repeat INR in 3 weeks, 12/28.  3. Verbal information provided over the phone. Tito Brennan RBV dosing instructions, expresses understanding by teach back, and has no further questions at this time.    Andrew Iqbal, SHERWIN  12/8/2021  08:47 EST    I, Moisés Magdaleno, PharmD, have reviewed the note in full and agree with the assessment and plan.  12/08/21  11:57 EST

## 2021-12-29 ENCOUNTER — ANTICOAGULATION VISIT (OUTPATIENT)
Dept: PHARMACY | Facility: HOSPITAL | Age: 61
End: 2021-12-29

## 2021-12-29 LAB — INR PPP: 2.21

## 2021-12-29 NOTE — PROGRESS NOTES
Anticoagulation Clinic - Remote Progress Note  Remote Lab    Indication: paroxysmal afib   Referring Provider: Ursula  Initial Warfarin Start Date: 18-19 years ago (2001)  Goal INR: 2.0-3.0  Current Drug Interactions: pantaprozole (moderate)  CHADS-VASc: 3 (HF, HTN)    Diet: no leafy greens 11/2/21  Alcohol:  no  Tobacco: no  OTC Pain Medication: occationally tylenol for sinus HA    INR History:  Date 3/2/20 3/10 3/16 3/24 4/1 4/10 4/24 5/15 6/11 6/25 7/9 7/30 8/27   Total WeeklyDose 45 mg / 5 days 70mg 57.5mg 60mg 57.5mg 60mg 60mg 60mg 60mg 62.5mg 62.5mg 62.5mg 62.5mg   INR 1.84 4.02 3.0 3.28 2.38 2.71 2.26 2.03 1.96 2.06 2.46 2.20 2.38   Notes postop  1x hold misdose         recv'd 8/28     Date 9/24 10/22 10/29 12/1 12/2 12/7 12/14 1/5/21 2/4 3/7 4/15 4/27 5/12   Total WeeklyDose 62.5mg 62.5mg 62.5mg 62.5mg 62.5mg 55mg 62.5mg 62.5mg 62.5mg 62.5mg 62.5mg 60mg 62.5mg   INR 2.27 2.23 2.10 6.90 3.97 2.52 2.22 2.94 2.46 2.68 2.50 2.31 2.58   Notes  Bactrim; clinda  ??? Lab error?  1x miss recv'd 12/15  recv'd 2/8    1x miss; recv'd 5/13     Date 5/18 6/2 6/11 6/21 7/6 8/3 8/6 8/9 8/16 8/22 9/2 9/13 9/20   Total WeeklyDose 62.5mg 52.5mg 66.25  mg 62.5 mg 62.5 mg 58.75 mg 65mg 47.5mg 65mg 60mg 60mg 60mg 65mg   INR 2.06 2.01 3.26 2.25 2.79 1.22 6.07 1.23 3.39 2.36 2.08 1.63 1.9   Notes  1x miss 1x incr dose   3x hold; 1x incr dose 2x incr dose 2x hold  recv'd 8/23  3x hold; 1x incr dose      Date 9/27 10/12 10/25 11/2 11/11 11/22 12/7 12/28       Total WeeklyDose 65mg 62.5mg 62.5mg 55mg 57.5mg 57.5mg 57.5mg 57.5mg       INR 2.4 2.33 3.83 2.40 2.60 1.96 2.30 2.21       Notes                 * takes warfarin in AM    Phone Interview:  Tablet Strength: 7.5mg and 10mg tablet (12/29/21)  Patient Contact Info: [phone] (mobile); [phone] (home)  Verbal Release Auth:   Lab Contact Info: EDUARDO Gonzalez [phone]; fax [phone]     Patient Findings  Negatives:  Signs/symptoms of thrombosis, Signs/symptoms of  bleeding, Laboratory test error suspected, Change in health, Change in alcohol use, Change in activity, Upcoming invasive procedure, Emergency department visit, Upcoming dental procedure, Missed doses, Extra doses, Change in medications, Change in diet/appetite, Hospital admission, Bruising, Other complaints     Plan:  1. INR is therapeutic today at 2.21. Instructed Mr. Brennan to continue warfarin 7.5mg oral daily except 10mg on TuesFri until recheck.  2. Repeat INR in 4 weeks, 1/25/22  3. Verbal information provided over the phone. Tito Brennan RBV dosing instructions, expresses understanding by teach back, and has no further questions at this time.    Andrew Iqbal, Tommy  12/29/2021  09:10 Jordana ROWAN, PharmD, have reviewed the note in full and agree with the assessment and plan.  12/29/21  11:34 EST

## 2022-01-25 ENCOUNTER — ANTICOAGULATION VISIT (OUTPATIENT)
Dept: PHARMACY | Facility: HOSPITAL | Age: 62
End: 2022-01-25

## 2022-01-25 LAB — INR PPP: 2.03

## 2022-01-25 NOTE — PROGRESS NOTES
Anticoagulation Clinic - Remote Progress Note  Remote Lab    Indication: paroxysmal afib   Referring Provider: Ursula  Initial Warfarin Start Date: 18-19 years ago (2001)  Goal INR: 2.0-3.0  Current Drug Interactions: pantaprozole (moderate)  CHADS-VASc: 3 (HF, HTN)    Diet: no leafy greens 1/25/22  Alcohol:  no  Tobacco: no  OTC Pain Medication: occationally tylenol for sinus HA    INR History:  Date 3/2/20 3/10 3/16 3/24 4/1 4/10 4/24 5/15 6/11 6/25 7/9 7/30 8/27   Total WeeklyDose 45 mg / 5 days 70mg 57.5mg 60mg 57.5mg 60mg 60mg 60mg 60mg 62.5mg 62.5mg 62.5mg 62.5mg   INR 1.84 4.02 3.0 3.28 2.38 2.71 2.26 2.03 1.96 2.06 2.46 2.20 2.38   Notes postop  1x hold misdose         recv'd 8/28     Date 9/24 10/22 10/29 12/1 12/2 12/7 12/14 1/5/21 2/4 3/7 4/15 4/27 5/12   Total WeeklyDose 62.5mg 62.5mg 62.5mg 62.5mg 62.5mg 55mg 62.5mg 62.5mg 62.5mg 62.5mg 62.5mg 60mg 62.5mg   INR 2.27 2.23 2.10 6.90 3.97 2.52 2.22 2.94 2.46 2.68 2.50 2.31 2.58   Notes  Bactrim; clinda  ??? Lab error?  1x miss recv'd 12/15  recv'd 2/8    1x miss; recv'd 5/13     Date 5/18 6/2 6/11 6/21 7/6 8/3 8/6 8/9 8/16 8/22 9/2 9/13 9/20   Total WeeklyDose 62.5mg 52.5mg 66.25  mg 62.5 mg 62.5 mg 58.75 mg 65mg 47.5mg 65mg 60mg 60mg 60mg 65mg   INR 2.06 2.01 3.26 2.25 2.79 1.22 6.07 1.23 3.39 2.36 2.08 1.63 1.9   Notes  1x miss 1x incr dose   3x hold; 1x incr dose 2x incr dose 2x hold  recv'd 8/23  3x hold; 1x incr dose      Date 9/27 10/12 10/25 11/2 11/11 11/22 12/7 12/28 1/25/22      Total WeeklyDose 65mg 62.5mg 62.5mg 55mg 57.5mg 57.5mg 57.5mg 57.5mg 57.5 mg      INR 2.4 2.33 3.83 2.40 2.60 1.96 2.30 2.21 2.03      Notes                 * takes warfarin in AM    Phone Interview:  Tablet Strength: 7.5mg and 10mg tablet (12/29/21)  Patient Contact Info: [phone] (mobile); [phone] (home)  Verbal Release Auth:   Lab Contact Info: EDUARDO Gonzalez [phone]; fax [phone]    Patient Findings:  Negatives:  Signs/symptoms of thrombosis,  Signs/symptoms of bleeding, Laboratory test error suspected, Change in health, Change in alcohol use, Change in activity, Upcoming invasive procedure, Emergency department visit, Upcoming dental procedure, Missed doses, Extra doses, Change in medications, Change in diet/appetite, Hospital admission, Bruising, Other complaints   Comments:  Mr. Brennan denies any changes since last encounter.     Plan:  1. INR is therapeutic today at [facility]. Instructed Mr. Brennan to continue warfarin 7.5mg oral daily except 10mg on TuesFri until recheck.  2. Repeat INR in two weeks, 2/8/22.  3. Verbal information provided over the phone. Tito Brennan RBV dosing instructions, expresses understanding by teach back, and has no further questions at this time.    Heather Light, SHERWIN  1/25/2022  14:40 EST      FATOUMATA, Jordana Desir, PharmD, have reviewed the note in full and agree with the assessment and plan.  01/25/22  15:56 EST  \

## 2022-02-08 ENCOUNTER — ANTICOAGULATION VISIT (OUTPATIENT)
Dept: PHARMACY | Facility: HOSPITAL | Age: 62
End: 2022-02-08

## 2022-02-08 LAB — INR PPP: 2.49

## 2022-02-08 NOTE — PROGRESS NOTES
Anticoagulation Clinic - Remote Progress Note  Remote Lab    Indication: paroxysmal afib   Referring Provider: Ursula  Initial Warfarin Start Date: 18-19 years ago (2001)  Goal INR: 2.0-3.0  Current Drug Interactions: pantaprozole (moderate)  CHADS-VASc: 3 (HF, HTN)    Diet: no leafy greens 2/8/22  Alcohol:  no  Tobacco: no  OTC Pain Medication: occationally tylenol for sinus HA    INR History:  Date 3/2/20 3/10 3/16 3/24 4/1 4/10 4/24 5/15 6/11 6/25 7/9 7/30 8/27   Total WeeklyDose 45 mg / 5 days 70mg 57.5mg 60mg 57.5mg 60mg 60mg 60mg 60mg 62.5mg 62.5mg 62.5mg 62.5mg   INR 1.84 4.02 3.0 3.28 2.38 2.71 2.26 2.03 1.96 2.06 2.46 2.20 2.38   Notes postop  1x hold misdose         recv'd 8/28     Date 9/24 10/22 10/29 12/1 12/2 12/7 12/14 1/5/21 2/4 3/7 4/15 4/27 5/12   Total WeeklyDose 62.5mg 62.5mg 62.5mg 62.5mg 62.5mg 55mg 62.5mg 62.5mg 62.5mg 62.5mg 62.5mg 60mg 62.5mg   INR 2.27 2.23 2.10 6.90 3.97 2.52 2.22 2.94 2.46 2.68 2.50 2.31 2.58   Notes  Bactrim; clinda  ??? Lab error?  1x miss recv'd 12/15  recv'd 2/8    1x miss; recv'd 5/13     Date 5/18 6/2 6/11 6/21 7/6 8/3 8/6 8/9 8/16 8/22 9/2 9/13 9/20   Total WeeklyDose 62.5mg 52.5mg 66.25  mg 62.5 mg 62.5 mg 58.75 mg 65mg 47.5mg 65mg 60mg 60mg 60mg 65mg   INR 2.06 2.01 3.26 2.25 2.79 1.22 6.07 1.23 3.39 2.36 2.08 1.63 1.9   Notes  1x miss 1x incr dose   3x hold; 1x incr dose 2x incr dose 2x hold  recv'd 8/23  3x hold; 1x incr dose      Date 9/27 10/12 10/25 11/2 11/11 11/22 12/7 12/28 1/25/22 2/8     Total WeeklyDose 65mg 62.5mg 62.5mg 55mg 57.5mg 57.5mg 57.5mg 57.5mg 57.5 mg 57.5 mg     INR 2.4 2.33 3.83 2.40 2.60 1.96 2.30 2.21 2.03 2.49     Notes                 * takes warfarin in AM    Phone Interview:  Tablet Strength: 7.5mg and 10mg tablet (12/29/21)  Patient Contact Info: [phone] (mobile); [phone] (home)  Verbal Release Auth:   Lab Contact Info: EDUARDO Gonzalez [phone]; fax [phone]    Patient Findings:  Negatives:  Signs/symptoms of  thrombosis, Signs/symptoms of bleeding, Laboratory test error suspected, Change in health, Change in alcohol use, Change in activity, Upcoming invasive procedure, Emergency department visit, Upcoming dental procedure, Missed doses, Extra doses, Change in medications, Change in diet/appetite, Hospital admission, Bruising, Other complaints   Comments:  Mr. Brennan denies any changes since last encounter. He did eat a little squash and zucchini but he has a small amount of GLV from time to time.     Plan:  1. INR is therapeutic today at 2.49. Instructed Mr. Brennan to continue warfarin 7.5 mg oral daily except 10 mg on TuesFri until recheck.  2. Repeat INR in 4 weeks, 3/8/22.  3. Verbal information provided over the phone. Tito Brennan RBV dosing instructions, expresses understanding by teach back, and has no further questions at this time.    Heather Light, [facility]  2/8/2022  14:28 Jordana ROWAN, PharmD, have reviewed the note in full and agree with the assessment and plan.  02/08/22  16:26 EST

## 2022-02-18 RX ORDER — WARFARIN SODIUM 7.5 MG/1
TABLET ORAL
Qty: 90 TABLET | Refills: 0 | Status: SHIPPED | OUTPATIENT
Start: 2022-02-18 | End: 2022-04-19

## 2022-02-18 RX ORDER — AMOXICILLIN AND CLAVULANATE POTASSIUM 875; 125 MG/1; MG/1
1 TABLET, FILM COATED ORAL 2 TIMES DAILY
COMMUNITY
Start: 2022-02-15 | End: 2022-02-22

## 2022-02-18 NOTE — TELEPHONE ENCOUNTER
Patient called to request refills on warfarin 7.5mg tablets. He also reported during our conversation that on Tues, 2/15, he started taking Augmentin 875-125mg BID x7 days. Should be due to take last dose on or about 2/22. Have discussed warfarin-Augmentin interaction and s/sx to be aware of. Have strongly recommended patient have INR drawn on Mon, 2/21, to ensure WNL. Patient is agreeable.

## 2022-02-22 LAB — INR PPP: 1.65

## 2022-02-23 ENCOUNTER — ANTICOAGULATION VISIT (OUTPATIENT)
Dept: PHARMACY | Facility: HOSPITAL | Age: 62
End: 2022-02-23

## 2022-02-23 NOTE — PROGRESS NOTES
Anticoagulation Clinic - Remote Progress Note  Remote Lab    Indication: paroxysmal afib   Referring Provider: Ursula  Initial Warfarin Start Date: 18-19 years ago (2001)  Goal INR: 2.0-3.0  Current Drug Interactions: pantaprozole (moderate)  CHADS-VASc: 3 (HF, HTN)    Diet: no leafy greens 2/8/22  Alcohol:  no  Tobacco: no  OTC Pain Medication: occationally tylenol for sinus HA    INR History:  Date 3/2/20 3/10 3/16 3/24 4/1 4/10 4/24 5/15 6/11 6/25 7/9 7/30 8/27   Total WeeklyDose 45 mg / 5 days 70mg 57.5mg 60mg 57.5mg 60mg 60mg 60mg 60mg 62.5mg 62.5mg 62.5mg 62.5mg   INR 1.84 4.02 3.0 3.28 2.38 2.71 2.26 2.03 1.96 2.06 2.46 2.20 2.38   Notes postop  1x hold misdose         recv'd 8/28     Date 9/24 10/22 10/29 12/1 12/2 12/7 12/14 1/5/21 2/4 3/7 4/15 4/27 5/12   Total WeeklyDose 62.5mg 62.5mg 62.5mg 62.5mg 62.5mg 55mg 62.5mg 62.5mg 62.5mg 62.5mg 62.5mg 60mg 62.5mg   INR 2.27 2.23 2.10 6.90 3.97 2.52 2.22 2.94 2.46 2.68 2.50 2.31 2.58   Notes  Bactrim; clinda  ??? Lab error?  1x miss recv'd 12/15  recv'd 2/8    1x miss; recv'd 5/13     Date 5/18 6/2 6/11 6/21 7/6 8/3 8/6 8/9 8/16 8/22 9/2 9/13 9/20   Total WeeklyDose 62.5mg 52.5mg 66.25  mg 62.5 mg 62.5 mg 58.75 mg 65mg 47.5mg 65mg 60mg 60mg 60mg 65mg   INR 2.06 2.01 3.26 2.25 2.79 1.22 6.07 1.23 3.39 2.36 2.08 1.63 1.9   Notes  1x miss 1x incr dose   3x hold; 1x incr dose 2x incr dose 2x hold  recv'd 8/23  3x hold; 1x incr dose      Date 9/27 10/12 10/25 11/2 11/11 11/22 12/7 12/28 1/25/22 2/8 2/23    Total WeeklyDose 65mg 62.5mg 62.5mg 55mg 57.5mg 57.5mg 57.5mg 57.5mg 57.5 mg 57.5 mg     INR 2.4 2.33 3.83 2.40 2.60 1.96 2.30 2.21 2.03 2.49 1.65    Notes                 * takes warfarin in AM    Phone Interview:  Tablet Strength: 7.5mg and 10mg tablet (12/29/21)  Patient Contact Info: [phone] (mobile); [phone] (home)  Verbal Release Auth:   Lab Contact Info: EDUARDO Gonzalez [phone]; fax [phone]    Patient Findings:  Positives:  Change in  medications   Negatives:  Signs/symptoms of thrombosis, Signs/symptoms of bleeding, Laboratory test error suspected, Change in health, Change in alcohol use, Change in activity, Upcoming invasive procedure, Emergency department visit, Upcoming dental procedure, Missed doses, Extra doses, Change in diet/appetite, Hospital admission, Bruising, Other complaints   Comments:  No changes per patient, finished taking Augmentin. Expected INR to be high if anything due to DDI.       Plan:  1. INR is subtherapeutic today at 1.65. No clear cause of low INR, DDI with Augmentin if anything should have increased INR. Patient has already taken dose of warfarin 7.5 mg this AM. Instructed Mr. Brennan to take an slightly increased dose of warfarin warfarin 7.5 mg oral daily except 10 mg on TuesThurSat until recheck (60 mg weekly dose, ~4% increase in dose). Will not be too aggressive in dose adjustment today due to previous stability.  2. Repeat INR in ~1 week, 3/3/22.  3. Verbal information provided over the phone. Tito Brennan RBV dosing instructions, expresses understanding by teach back, and has no further questions at this time.    Moisés Magdaleno, PharmD, BCPS  2/23/2022  08:05 EST

## 2022-03-03 ENCOUNTER — TELEPHONE (OUTPATIENT)
Dept: PHARMACY | Facility: HOSPITAL | Age: 62
End: 2022-03-03

## 2022-03-03 ENCOUNTER — ANTICOAGULATION VISIT (OUTPATIENT)
Dept: PHARMACY | Facility: HOSPITAL | Age: 62
End: 2022-03-03

## 2022-03-03 LAB — INR PPP: 2.1

## 2022-03-03 NOTE — PROGRESS NOTES
Anticoagulation Clinic - Remote Progress Note  Remote Lab    Indication: paroxysmal afib   Referring Provider: Ursula  Initial Warfarin Start Date: 18-19 years ago (2001)  Goal INR: 2.0-3.0  Current Drug Interactions: pantaprozole (moderate)  CHADS-VASc: 3 (HF, HTN)    Diet: no leafy greens 2/8/22  Alcohol:  no  Tobacco: no  OTC Pain Medication: occationally tylenol for sinus HA    INR History:  Date 3/2/20 3/10 3/16 3/24 4/1 4/10 4/24 5/15 6/11 6/25 7/9 7/30 8/27   Total WeeklyDose 45 mg / 5 days 70mg 57.5mg 60mg 57.5mg 60mg 60mg 60mg 60mg 62.5mg 62.5mg 62.5mg 62.5mg   INR 1.84 4.02 3.0 3.28 2.38 2.71 2.26 2.03 1.96 2.06 2.46 2.20 2.38   Notes postop  1x hold misdose         recv'd 8/28     Date 9/24 10/22 10/29 12/1 12/2 12/7 12/14 1/5/21 2/4 3/7 4/15 4/27 5/12   Total WeeklyDose 62.5mg 62.5mg 62.5mg 62.5mg 62.5mg 55mg 62.5mg 62.5mg 62.5mg 62.5mg 62.5mg 60mg 62.5mg   INR 2.27 2.23 2.10 6.90 3.97 2.52 2.22 2.94 2.46 2.68 2.50 2.31 2.58   Notes  Bactrim; clinda  ??? Lab error?  1x miss recv'd 12/15  recv'd 2/8    1x miss; recv'd 5/13     Date 5/18 6/2 6/11 6/21 7/6 8/3 8/6 8/9 8/16 8/22 9/2 9/13 9/20   Total WeeklyDose 62.5mg 52.5mg 66.25  mg 62.5 mg 62.5 mg 58.75 mg 65mg 47.5mg 65mg 60mg 60mg 60mg 65mg   INR 2.06 2.01 3.26 2.25 2.79 1.22 6.07 1.23 3.39 2.36 2.08 1.63 1.9   Notes  1x miss 1x incr dose   3x hold; 1x incr dose 2x incr dose 2x hold  recv'd 8/23  3x hold; 1x incr dose      Date 9/27 10/12 10/25 11/2 11/11 11/22 12/7 12/28 1/25/22 2/8 2/23 3/3     Total WeeklyDose 65mg 62.5mg 62.5mg 55mg 57.5mg 57.5mg 57.5mg 57.5mg 57.5 mg 57.5 mg 57.5 mg 60 mg     INR 2.4 2.33 3.83 2.40 2.60 1.96 2.30 2.21 2.03 2.49 1.65 2.10     Notes                   * takes warfarin in AM    Phone Interview:  Tablet Strength: 7.5mg and 10mg tablet (12/29/21)  Patient Contact Info: [phone] (mobile); [phone] (home)  Verbal Release Auth:   Lab Contact Info: EDUARDO Gonzalez [phone]; fax [phone]      Positives:   Upcoming invasive procedure   Negatives:  Signs/symptoms of thrombosis, Signs/symptoms of bleeding, Laboratory test error suspected, Change in health, Change in alcohol use, Change in activity, Emergency department visit, Upcoming dental procedure, Missed doses, Extra doses, Change in medications, Change in diet/appetite, Hospital admission, Bruising, Other complaints   Comments:  Having procedure on his hand 3/28 With Dr Alfred at  (Phone: [phone])   Spoke with Julia hold warfarin 3-5 days prior to procedure. Ok to resume warfarin post op day 1           Plan:  1. INR is back WNL today at 2.10. Instructed Mr. Brennan to continue warfarin 7.5 mg daily except 10 mg on TuesThursSat until recheck.  2. Repeat INR in CLINIC prior to apt with Dr Vergara 3/16.   3. Verbal information provided over the phone. Tito Brennan RBV dosing instructions, expresses understanding by teach back, and has no further questions at this time.  4. Perioperative ivent open, can discuss at next encounter. Plan sent to Dr Vergara for approval    Jordana Desir, PharmD.  03/03/22   13:37 EST

## 2022-03-03 NOTE — TELEPHONE ENCOUNTER
Dr. Vergara,    We were recently informed that Tito Brennan is undergoing a procedure on his hand on 3/28/22] with Dr Alfred at [facility]. Dr. Alfred requested that the patient hold warfarin 4 days prior to procedure and resume warfarin post-op day 1.     Tito Brennan is a 61 y.o. male on warfarin for a fib, therefore bridge therapy is not recommended    [3/24]: Hold warfarin 4 days   [3/28]: procedure  [3/29]: resume warfarin [2-3 boosted doses if appropriate]    Please advise if you are agreeable to plan above or if you prefer an alternative approach to Tito Brennan's anticoagulation plan for the upcoming procedure. In addition, please advise if surgeon's office should contact your office for further cardiac clearance.        Thank you,    Jordana Desir, PharmD    [facility] Anticoagulation Team  (t) [phone]  (f) [phone]

## 2022-03-16 ENCOUNTER — ANTICOAGULATION VISIT (OUTPATIENT)
Dept: PHARMACY | Facility: HOSPITAL | Age: 62
End: 2022-03-16

## 2022-03-16 ENCOUNTER — OFFICE VISIT (OUTPATIENT)
Dept: CARDIOLOGY | Facility: CLINIC | Age: 62
End: 2022-03-16

## 2022-03-16 ENCOUNTER — HOSPITAL ENCOUNTER (OUTPATIENT)
Dept: CARDIOLOGY | Facility: HOSPITAL | Age: 62
Discharge: HOME | End: 2022-03-16
Admitting: INTERNAL MEDICINE

## 2022-03-16 VITALS — BODY MASS INDEX: 37.37 KG/M2 | HEIGHT: 73 IN | WEIGHT: 282 LBS

## 2022-03-16 VITALS
BODY MASS INDEX: 37.37 KG/M2 | HEIGHT: 73 IN | SYSTOLIC BLOOD PRESSURE: 128 MMHG | WEIGHT: 282 LBS | HEART RATE: 105 BPM | DIASTOLIC BLOOD PRESSURE: 76 MMHG | OXYGEN SATURATION: 97 %

## 2022-03-16 DIAGNOSIS — I42.8 NICM (NONISCHEMIC CARDIOMYOPATHY): ICD-10-CM

## 2022-03-16 DIAGNOSIS — I48.21 PERMANENT ATRIAL FIBRILLATION: Primary | ICD-10-CM

## 2022-03-16 DIAGNOSIS — I10 PRIMARY HYPERTENSION: ICD-10-CM

## 2022-03-16 DIAGNOSIS — I48.21 PERMANENT ATRIAL FIBRILLATION: ICD-10-CM

## 2022-03-16 LAB
ASCENDING AORTA: 3 CM
BH CV ECHO MEAS - AO MAX PG (FULL): 8.7 MMHG
BH CV ECHO MEAS - AO MAX PG: 11.3 MMHG
BH CV ECHO MEAS - AO MEAN PG (FULL): 4.7 MMHG
BH CV ECHO MEAS - AO MEAN PG: 6 MMHG
BH CV ECHO MEAS - AO ROOT AREA (BSA CORRECTED): 1.7
BH CV ECHO MEAS - AO ROOT AREA: 13.6 CM^2
BH CV ECHO MEAS - AO ROOT DIAM: 4.2 CM
BH CV ECHO MEAS - AO V2 MAX: 168.3 CM/SEC
BH CV ECHO MEAS - AO V2 MEAN: 112.4 CM/SEC
BH CV ECHO MEAS - AO V2 VTI: 32.1 CM
BH CV ECHO MEAS - AVA(I,A): 1.9 CM^2
BH CV ECHO MEAS - AVA(I,D): 1.9 CM^2
BH CV ECHO MEAS - AVA(V,A): 1.9 CM^2
BH CV ECHO MEAS - AVA(V,D): 1.9 CM^2
BH CV ECHO MEAS - BSA(HAYCOCK): 2.6 M^2
BH CV ECHO MEAS - BSA: 2.5 M^2
BH CV ECHO MEAS - BZI_BMI: 37.2 KILOGRAMS/M^2
BH CV ECHO MEAS - BZI_METRIC_HEIGHT: 185.4 CM
BH CV ECHO MEAS - BZI_METRIC_WEIGHT: 127.9 KG
BH CV ECHO MEAS - EDV(CUBED): 142.9 ML
BH CV ECHO MEAS - EDV(MOD-SP2): 82 ML
BH CV ECHO MEAS - EDV(MOD-SP4): 128 ML
BH CV ECHO MEAS - EDV(TEICH): 131.1 ML
BH CV ECHO MEAS - EF(CUBED): 56.7 %
BH CV ECHO MEAS - EF(MOD-BP): 50 %
BH CV ECHO MEAS - EF(MOD-SP2): 46.3 %
BH CV ECHO MEAS - EF(MOD-SP4): 50 %
BH CV ECHO MEAS - EF(TEICH): 48 %
BH CV ECHO MEAS - ESV(CUBED): 61.9 ML
BH CV ECHO MEAS - ESV(MOD-SP2): 44 ML
BH CV ECHO MEAS - ESV(MOD-SP4): 64 ML
BH CV ECHO MEAS - ESV(TEICH): 68.2 ML
BH CV ECHO MEAS - FS: 24.3 %
BH CV ECHO MEAS - IVS/LVPW: 1
BH CV ECHO MEAS - IVSD: 1 CM
BH CV ECHO MEAS - LA DIMENSION: 5.2 CM
BH CV ECHO MEAS - LA/AO: 1.3
BH CV ECHO MEAS - LAD MAJOR: 7.5 CM
BH CV ECHO MEAS - LAT PEAK E' VEL: 12.6 CM/SEC
BH CV ECHO MEAS - LATERAL E/E' RATIO: 7.1
BH CV ECHO MEAS - LV DIASTOLIC VOL/BSA (35-75): 51.4 ML/M^2
BH CV ECHO MEAS - LV IVRT: 0.09 SEC
BH CV ECHO MEAS - LV MASS(C)D: 195.4 GRAMS
BH CV ECHO MEAS - LV MASS(C)DI: 78.5 GRAMS/M^2
BH CV ECHO MEAS - LV MAX PG: 2.7 MMHG
BH CV ECHO MEAS - LV MEAN PG: 1.3 MMHG
BH CV ECHO MEAS - LV SYSTOLIC VOL/BSA (12-30): 25.7 ML/M^2
BH CV ECHO MEAS - LV V1 MAX: 81.4 CM/SEC
BH CV ECHO MEAS - LV V1 MEAN: 51 CM/SEC
BH CV ECHO MEAS - LV V1 VTI: 15.7 CM
BH CV ECHO MEAS - LVIDD: 5.2 CM
BH CV ECHO MEAS - LVIDS: 4 CM
BH CV ECHO MEAS - LVLD AP2: 7.4 CM
BH CV ECHO MEAS - LVLD AP4: 8.1 CM
BH CV ECHO MEAS - LVLS AP2: 6.9 CM
BH CV ECHO MEAS - LVLS AP4: 7.2 CM
BH CV ECHO MEAS - LVOT AREA (M): 3.8 CM^2
BH CV ECHO MEAS - LVOT AREA: 3.9 CM^2
BH CV ECHO MEAS - LVOT DIAM: 2.2 CM
BH CV ECHO MEAS - LVPWD: 1 CM
BH CV ECHO MEAS - MED PEAK E' VEL: 7.3 CM/SEC
BH CV ECHO MEAS - MEDIAL E/E' RATIO: 12.1
BH CV ECHO MEAS - MV DEC SLOPE: 453.7 CM/SEC^2
BH CV ECHO MEAS - MV DEC TIME: 0.21 SEC
BH CV ECHO MEAS - MV E MAX VEL: 90.8 CM/SEC
BH CV ECHO MEAS - MV MAX PG: 5.7 MMHG
BH CV ECHO MEAS - MV MEAN PG: 2.8 MMHG
BH CV ECHO MEAS - MV P1/2T MAX VEL: 110.2 CM/SEC
BH CV ECHO MEAS - MV P1/2T: 71.2 MSEC
BH CV ECHO MEAS - MV V2 MAX: 118.9 CM/SEC
BH CV ECHO MEAS - MV V2 MEAN: 77.8 CM/SEC
BH CV ECHO MEAS - MV V2 VTI: 26.2 CM
BH CV ECHO MEAS - MVA P1/2T LCG: 2 CM^2
BH CV ECHO MEAS - MVA(P1/2T): 3.1 CM^2
BH CV ECHO MEAS - MVA(VTI): 2.3 CM^2
BH CV ECHO MEAS - PA ACC SLOPE: 449.6 CM/SEC^2
BH CV ECHO MEAS - PA ACC TIME: 0.13 SEC
BH CV ECHO MEAS - PA MAX PG: 4.7 MMHG
BH CV ECHO MEAS - PA PR(ACCEL): 18.8 MMHG
BH CV ECHO MEAS - PA V2 MAX: 108.6 CM/SEC
BH CV ECHO MEAS - RAP SYSTOLE: 3 MMHG
BH CV ECHO MEAS - RVSP: 28.6 MMHG
BH CV ECHO MEAS - SI(AO): 175.2 ML/M^2
BH CV ECHO MEAS - SI(CUBED): 32.5 ML/M^2
BH CV ECHO MEAS - SI(LVOT): 24.5 ML/M^2
BH CV ECHO MEAS - SI(MOD-SP2): 15.3 ML/M^2
BH CV ECHO MEAS - SI(MOD-SP4): 25.7 ML/M^2
BH CV ECHO MEAS - SI(TEICH): 25.3 ML/M^2
BH CV ECHO MEAS - SV(AO): 436.2 ML
BH CV ECHO MEAS - SV(CUBED): 81 ML
BH CV ECHO MEAS - SV(LVOT): 61.1 ML
BH CV ECHO MEAS - SV(MOD-SP2): 38 ML
BH CV ECHO MEAS - SV(MOD-SP4): 64 ML
BH CV ECHO MEAS - SV(TEICH): 62.9 ML
BH CV ECHO MEAS - TAPSE (>1.6): 1.5 CM
BH CV ECHO MEAS - TR MAX PG: 25.6 MMHG
BH CV ECHO MEAS - TR MAX VEL: 253 CM/SEC
BH CV ECHO MEASUREMENTS AVERAGE E/E' RATIO: 9.13
BH CV VAS BP LEFT ARM: NORMAL MMHG
BH CV XLRA - RV BASE: 4.3 CM
BH CV XLRA - RV LENGTH: 6.9 CM
BH CV XLRA - RV MID: 3.3 CM
BH CV XLRA - TDI S': 10.9 CM/SEC
INR PPP: 2.2 (ref 0.91–1.09)
IVRT: 87 MSEC
LEFT ATRIUM VOLUME INDEX: 32.5 ML/M^2
LEFT ATRIUM VOLUME: 81 ML
PROTHROMBIN TIME: 26 SECONDS (ref 10–13.8)

## 2022-03-16 PROCEDURE — 93306 TTE W/DOPPLER COMPLETE: CPT | Performed by: INTERNAL MEDICINE

## 2022-03-16 PROCEDURE — 93000 ELECTROCARDIOGRAM COMPLETE: CPT | Performed by: INTERNAL MEDICINE

## 2022-03-16 PROCEDURE — 36416 COLLJ CAPILLARY BLOOD SPEC: CPT

## 2022-03-16 PROCEDURE — 85610 PROTHROMBIN TIME: CPT

## 2022-03-16 PROCEDURE — G0463 HOSPITAL OUTPT CLINIC VISIT: HCPCS

## 2022-03-16 PROCEDURE — 99213 OFFICE O/P EST LOW 20 MIN: CPT | Performed by: INTERNAL MEDICINE

## 2022-03-16 PROCEDURE — 93306 TTE W/DOPPLER COMPLETE: CPT

## 2022-03-16 RX ORDER — SACUBITRIL AND VALSARTAN 49; 51 MG/1; MG/1
1 TABLET, FILM COATED ORAL 2 TIMES DAILY
Qty: 60 TABLET | Refills: 6 | Status: SHIPPED | OUTPATIENT
Start: 2022-03-16 | End: 2022-10-26

## 2022-03-16 RX ORDER — METOPROLOL SUCCINATE 100 MG/1
100 TABLET, EXTENDED RELEASE ORAL 2 TIMES DAILY
Qty: 60 TABLET | Refills: 6 | Status: SHIPPED | OUTPATIENT
Start: 2022-03-16 | End: 2022-10-26

## 2022-03-16 NOTE — PROGRESS NOTES
Tito Brennan  1960  [phone]    03/16/2022    [facility] CARDIOLOGY     Referring Provider: No ref. provider found     Gabe Mims MD  [address] [facility] [place] [address]    Chief Complaint   Patient presents with   • Atrial Fibrillation       Problem List:   1. Persistent atrial fibrillation  a. CHADSVASc = 3, on warfarin  b. Diagnosed ~2000 at time of colonoscopy, asymptomatic  c. Admitted for Tikosyn initiation, 06/2019, failed ECV x 3, ERAF after ICV, Tikosyn discontinued  2. NICM  a. Echocardiogram 12/28/2018: EF 40%, LA size 5.2 cm in atrial fibrillation during exam  b. Echo 10/10/2019 LVEF 40%, Mild MR  3. Coronary artery disease  a. Nuclear stress test 2/13/2019: Moderate size, moderate intensity anteroseptal wall reversible defect suggestive of ischemia, EF 37%  b. Left heart catheterization 3/6/2019: Nonobstructive, noncritical coronary artery disease to the distal RCA, 20-30%, EF 35-40%  c. Echo 10/10/2019 LVEF 40%, Mild MR  4. Diabetes mellitus type II  5. Hypertension    Allergies  No Known Allergies    Current Medications    Current Outpatient Medications:   •  cetirizine (zyrTEC) 10 MG tablet, Take 10 mg by mouth Daily., Disp: , Rfl:   •  Magnesium Oxide 400 (240 Mg) MG tablet, Take 1 tablet by mouth Daily., Disp: 30 tablet, Rfl: 11  •  metFORMIN (GLUCOPHAGE) 1000 MG tablet, Take 1,000 mg by mouth 2 (Two) Times a Day With Meals., Disp: , Rfl:   •  metoprolol succinate XL (TOPROL-XL) 100 MG 24 hr tablet, Take 1 tablet by mouth 2 (two) times a day., Disp: 60 tablet, Rfl: 6  •  metoprolol tartrate (LOPRESSOR) 25 MG tablet, Take 1 tablet by mouth As Needed (palpitatons, hr >100)., Disp: 60 tablet, Rfl: 11  •  pantoprazole (PROTONIX) 40 MG EC tablet, Take 1 tablet by mouth Daily., Disp: 30 tablet, Rfl: 6  •  sacubitril-valsartan (Entresto) 49-51 MG tablet, Take 1 tablet by mouth 2 (Two) Times a Day., Disp: 60 tablet, Rfl: 6  •  sildenafil (VIAGRA) 100 MG tablet, Take 100 mg by  "mouth Daily As Needed for Erectile Dysfunction., Disp: , Rfl:   •  warfarin (COUMADIN) 10 MG tablet, Take 1 tablet by mouth daily or as directed by anticoagulation clinic., Disp: 65 tablet, Rfl: 0  •  warfarin (COUMADIN) 7.5 MG tablet, Take one tablet by mouth daily or as directed by the anticoagulation clinic, Disp: 90 tablet, Rfl: 0    History of Present Illness     Pt presents for follow up of AF/NICM/CAD/HTN. Since we last saw the pt, pt denies any palps,  SOB, CP, LH, and dizziness. Denies any hospitalizations, ER visits, bleeding, or TIA/CVA symptoms. Overall feels well. BP stable HR usually at 70's bpm. Has upcoming hand surgery on 28th    ROS:  General:  Denies fatigue, weight gain or loss  Cardiovascular:  Denies CP, PND, syncope, near syncope, edema or palpitations.  Pulmonary:  Denies GOODWIN, cough, or wheezing      Vitals:    03/16/22 1129   BP: 128/76   BP Location: Right arm   Patient Position: Sitting   Pulse: 105   SpO2: 97%   Weight: 128 kg (282 lb)   Height: 185.4 cm (73\")     Body mass index is 37.21 kg/m².  PE:  General: NAD  Neck: no JVD, no carotid bruits, no TM  Heart irreg irreg rate and rhythm, NL S1, S2, no rubs, murmurs  Lungs: CTA, no wheezes, rhonchi, or rales  Abd: soft, non-tender, NL BS  Ext: No musculoskeletal deformities, no edema, cyanosis, or clubbing  Psych: normal mood and affect    Diagnostic Data:        ECG 12 Lead    Date/Time: 3/16/2022 11:41 AM  Performed by: Adam Vergara MD  Authorized by: Adam Vergara MD   Comparison: compared with previous ECG from 7/3/2019  Similar to previous ECG  Rhythm: atrial fibrillation  BPM: 80              1. Permanent atrial fibrillation (HCC)    2. NICM (nonischemic cardiomyopathy) (HCC)    3. Primary hypertension          Plan:    1. Permanent Afib: asymptomatic HR stable    2. NICM: EF 40% Echo 2019. Entresto 49/51 BID: Need echocardiogram  3. HTN: Controlled on BB, Entresto  4. Anticoagulation: Chadsvasc=4 on warfarin     F/up in " 6 months

## 2022-03-16 NOTE — PROGRESS NOTES
Anticoagulation Clinic - Remote Progress Note  Remote Lab    Indication: paroxysmal afib   Referring Provider: Ursula  Initial Warfarin Start Date: 18-19 years ago (2001)  Goal INR: 2.0-3.0  Current Drug Interactions: pantaprozole (moderate); Fish oil and black cherry pills occassionally  CHADS-VASc: 3 (HF, HTN)    Diet: no leafy greens 2/8/22  Alcohol:  no  Tobacco: no  OTC Pain Medication: occationally tylenol for sinus HA    INR History:  Date 3/2/20 3/10 3/16 3/24 4/1 4/10 4/24 5/15 6/11 6/25 7/9 7/30 8/27   Total WeeklyDose 45 mg / 5 days 70mg 57.5mg 60mg 57.5mg 60mg 60mg 60mg 60mg 62.5mg 62.5mg 62.5mg 62.5mg   INR 1.84 4.02 3.0 3.28 2.38 2.71 2.26 2.03 1.96 2.06 2.46 2.20 2.38   Notes postop  1x hold misdose         recv'd 8/28     Date 9/24 10/22 10/29 12/1 12/2 12/7 12/14 1/5/21 2/4 3/7 4/15 4/27 5/12   Total WeeklyDose 62.5mg 62.5mg 62.5mg 62.5mg 62.5mg 55mg 62.5mg 62.5mg 62.5mg 62.5mg 62.5mg 60mg 62.5mg   INR 2.27 2.23 2.10 6.90 3.97 2.52 2.22 2.94 2.46 2.68 2.50 2.31 2.58   Notes  Bactrim; clinda  ??? Lab error?  1x miss recv'd 12/15  recv'd 2/8    1x miss; recv'd 5/13     Date 5/18 6/2 6/11 6/21 7/6 8/3 8/6 8/9 8/16 8/22 9/2 9/13 9/20   Total WeeklyDose 62.5mg 52.5mg 66.25  mg 62.5 mg 62.5 mg 58.75 mg 65mg 47.5mg 65mg 60mg 60mg 60mg 65mg   INR 2.06 2.01 3.26 2.25 2.79 1.22 6.07 1.23 3.39 2.36 2.08 1.63 1.9   Notes  1x miss 1x incr dose   3x hold; 1x incr dose 2x incr dose 2x hold  recv'd 8/23  3x hold; 1x incr dose      Date 9/27 10/12 10/25 11/2 11/11 11/22 12/7 12/28 1/25/22 2/8 2/23 3/3     Total WeeklyDose 65mg 62.5mg 62.5mg 55mg 57.5mg 57.5mg 57.5mg 57.5mg 57.5 mg 57.5 mg 57.5 mg 60 mg     INR 2.4 2.33 3.83 2.40 2.60 1.96 2.30 2.21 2.03 2.49 1.65 2.10     Notes                 * takes warfarin in AM    Phone Interview:  Tablet Strength: 7.5mg and 10mg tablet (12/29/21)  Patient Contact Info: [phone] (mobile); [phone] (home)  Verbal Release Auth: Charo Schneider (mother)  Lab Contact Info:  [facility] [phone]; fax [phone]    Patient Findings  Negatives:  Signs/symptoms of thrombosis, Signs/symptoms of bleeding, Laboratory test error suspected, Change in health, Change in alcohol use, Change in activity, Upcoming invasive procedure, Emergency department visit, Upcoming dental procedure, Missed doses, Extra doses, Change in medications, Change in diet/appetite, Hospital admission, Bruising, Other complaints     Plan:  1. INR is back WNL today at 2.10. Instructed Mr. Brennan to continue warfarin 7.5 mg daily except 10 mg on TuesThursSat until recheck. Until surgery then following dosing directions below:     [3/24]: Hold warfarin 4 days   [3/28]: procedure  [3/29]: resume warfarin 15mg  [3/30]: warfarin 10mg       2. Repeat INR at [facility] per usual.  3. Verbal information provided over the phone. Tito Brennan RBV dosing instructions, expresses understanding by teach back, and has no further questions at this time.  4. Perioperative ivent open, can discuss at next encounter. Plan sent to Dr Vergara for approval      Leyla Saba, PharmD  03/16/22   10:58 EDT

## 2022-03-23 ENCOUNTER — TELEPHONE (OUTPATIENT)
Dept: CARDIOLOGY | Facility: CLINIC | Age: 62
End: 2022-03-23

## 2022-03-23 NOTE — TELEPHONE ENCOUNTER
Adam Vergara MD Childers, Tracy R, RN  Please call the patient and tell him that his LVEF is now improved to 45 to 50% on his echo.  This is good news.

## 2022-04-05 ENCOUNTER — ANTICOAGULATION VISIT (OUTPATIENT)
Dept: PHARMACY | Facility: HOSPITAL | Age: 62
End: 2022-04-05

## 2022-04-05 LAB — INR PPP: 1.51

## 2022-04-05 NOTE — PROGRESS NOTES
Anticoagulation Clinic - Remote Progress Note  Remote Lab    Indication: paroxysmal afib   Referring Provider: Ursula  Initial Warfarin Start Date: 18-19 years ago (2001)  Goal INR: 2.0-3.0  Current Drug Interactions: pantaprozole (moderate); Fish oil and black cherry pills occassionally  CHADS-VASc: 3 (HF, HTN)    Diet: no leafy greens 2/8/22  Alcohol:  no  Tobacco: no  OTC Pain Medication: occationally tylenol for sinus HA    INR History:  Date 3/2/20 3/10 3/16 3/24 4/1 4/10 4/24 5/15 6/11 6/25 7/9 7/30 8/27   Total WeeklyDose 45 mg / 5 days 70mg 57.5mg 60mg 57.5mg 60mg 60mg 60mg 60mg 62.5mg 62.5mg 62.5mg 62.5mg   INR 1.84 4.02 3.0 3.28 2.38 2.71 2.26 2.03 1.96 2.06 2.46 2.20 2.38   Notes postop  1x hold misdose         recv'd 8/28     Date 9/24 10/22 10/29 12/1 12/2 12/7 12/14 1/5/21 2/4 3/7 4/15 4/27 5/12   Total WeeklyDose 62.5mg 62.5mg 62.5mg 62.5mg 62.5mg 55mg 62.5mg 62.5mg 62.5mg 62.5mg 62.5mg 60mg 62.5mg   INR 2.27 2.23 2.10 6.90 3.97 2.52 2.22 2.94 2.46 2.68 2.50 2.31 2.58   Notes  Bactrim; clinda  ??? Lab error?  1x miss recv'd 12/15  recv'd 2/8    1x miss; recv'd 5/13     Date 5/18 6/2 6/11 6/21 7/6 8/3 8/6 8/9 8/16 8/22 9/2 9/13 9/20   Total WeeklyDose 62.5mg 52.5mg 66.25  mg 62.5 mg 62.5 mg 58.75 mg 65mg 47.5mg 65mg 60mg 60mg 60mg 65mg   INR 2.06 2.01 3.26 2.25 2.79 1.22 6.07 1.23 3.39 2.36 2.08 1.63 1.9   Notes  1x miss 1x incr dose   3x hold; 1x incr dose 2x incr dose 2x hold  recv'd 8/23  3x hold; 1x incr dose      Date 9/27 10/12 10/25 11/2 11/11 11/22 12/7 12/28 1/25/22 2/8 2/23 3/3   Total WeeklyDose 65mg 62.5mg 62.5mg 55mg 57.5mg 57.5mg 57.5mg 57.5mg 57.5 mg 57.5 mg 57.5 mg 60 mg   INR 2.4 2.33 3.83 2.40 2.60 1.96 2.30 2.21 2.03 2.49 1.65 2.10   Notes                 Date  4/5              Total WeeklyDose 67.5 mg              INR 1.51              Notes proced holdx5                * takes warfarin in AM    Phone Interview:  Tablet Strength: 7.5mg and 10mg tablet (12/29/21)  Patient Contact  Info: [phone] (mobile); [phone] (home)  Verbal Release Auth: Charo Schneider (mother)  Lab Contact Info: EDUARDO Gonzalez [phone]; fax [phone]     Patient Findings  Positives:  Change in medications, Other complaints   Negatives:  Signs/symptoms of thrombosis, Signs/symptoms of bleeding, Laboratory test error suspected, Change in health, Change in alcohol use, Change in activity, Upcoming invasive procedure, Emergency department visit, Upcoming dental procedure, Missed doses, Extra doses, Change in diet/appetite, Hospital admission, Bruising   Comments:  Visit with Dr. Vergara on 3/16/22:  ECHO:  his LVEF is now improved to 45 to 50% on his echo.  No changes noted.   Mr. Brennan is s/p 5 day hold of warfarin for procedure on his hand last week.   He had taken some oxycodone and acetaminophen post - procedure.   He reported visiting chiropractor after procedure as hurts from his hip to his toes.   Otherwise, above findings negative     Plan:    1. INR is sub therapeutic today at 1.51 s/p 5 day hold for procedure/ boosted dose. Instructed Mr. Brennan to boost warfarin 10 mg on 4/6; otherwise, continue maintenance regimen of warfarin 7.5 mg oral daily except 10 mg on TuesThursSat until recheck.   2. Repeat INR on Wednesday, 4/13 at [facility] per usual.  3. Verbal information provided over the phone. Tito BROWNING Brookschaitanya RBV dosing instructions, expresses understanding by teach back, and has no further questions at this time.    Bing Flynn, PharmD  04/05/22   15:19 EDT

## 2022-04-13 ENCOUNTER — ANTICOAGULATION VISIT (OUTPATIENT)
Dept: PHARMACY | Facility: HOSPITAL | Age: 62
End: 2022-04-13

## 2022-04-13 DIAGNOSIS — I48.19 ATRIAL FIBRILLATION, PERSISTENT: Primary | ICD-10-CM

## 2022-04-13 LAB — INR PPP: 2.47

## 2022-04-13 NOTE — PROGRESS NOTES
Anticoagulation Clinic - Remote Progress Note  Remote Lab    Indication: paroxysmal afib   Referring Provider: Ursula  Initial Warfarin Start Date: 18-19 years ago (2001)  Goal INR: 2.0-3.0  Current Drug Interactions: pantaprozole (moderate); Fish oil and black cherry pills occassionally  CHADS-VASc: 3 (HF, HTN)    Diet: no leafy greens 4/13/22  Alcohol:  no  Tobacco: no  OTC Pain Medication: occationally tylenol for sinus HA    INR History:  Date 3/2/20 3/10 3/16 3/24 4/1 4/10 4/24 5/15 6/11 6/25 7/9 7/30 8/27   Total WeeklyDose 45 mg / 5 days 70mg 57.5mg 60mg 57.5mg 60mg 60mg 60mg 60mg 62.5mg 62.5mg 62.5mg 62.5mg   INR 1.84 4.02 3.0 3.28 2.38 2.71 2.26 2.03 1.96 2.06 2.46 2.20 2.38   Notes postop  1x hold misdose         recv'd 8/28     Date 9/24 10/22 10/29 12/1 12/2 12/7 12/14 1/5/21 2/4 3/7 4/15 4/27 5/12   Total WeeklyDose 62.5mg 62.5mg 62.5mg 62.5mg 62.5mg 55mg 62.5mg 62.5mg 62.5mg 62.5mg 62.5mg 60mg 62.5mg   INR 2.27 2.23 2.10 6.90 3.97 2.52 2.22 2.94 2.46 2.68 2.50 2.31 2.58   Notes  Bactrim; clinda  ??? Lab error?  1x miss recv'd 12/15  recv'd 2/8    1x miss; recv'd 5/13     Date 5/18 6/2 6/11 6/21 7/6 8/3 8/6 8/9 8/16 8/22 9/2 9/13 9/20   Total WeeklyDose 62.5mg 52.5mg 66.25  mg 62.5 mg 62.5 mg 58.75 mg 65mg 47.5mg 65mg 60mg 60mg 60mg 65mg   INR 2.06 2.01 3.26 2.25 2.79 1.22 6.07 1.23 3.39 2.36 2.08 1.63 1.9   Notes  1x miss 1x incr dose   3x hold; 1x incr dose 2x incr dose 2x hold  recv'd 8/23  3x hold; 1x incr dose      Date 9/27 10/12 10/25 11/2 11/11 11/22 12/7 12/28 1/25/22 2/8 2/23 3/3   Total WeeklyDose 65mg 62.5mg 62.5mg 55mg 57.5mg 57.5mg 57.5mg 57.5mg 57.5 mg 57.5 mg 57.5 mg 60 mg   INR 2.4 2.33 3.83 2.40 2.60 1.96 2.30 2.21 2.03 2.49 1.65 2.10   Notes                 Date  4/5 4/13             Total WeeklyDose 67.5 mg 62.5 mg             INR 1.51 2.47             Notes proced holdx5                * takes warfarin in AM    Phone Interview:  Tablet Strength: 7.5mg and 10mg tablet  (12/29/21)  Patient Contact Info: [phone] (mobile); [phone] (home)  Verbal Release Auth: Charo Schneider (mother)  Lab Contact Info: EDUARDO Gonzalez [phone]; fax [phone]     Patient Findings:  Positives:  Change in medications, Change in diet/appetite   Negatives:  Signs/symptoms of thrombosis, Signs/symptoms of bleeding, Laboratory test error suspected, Change in health, Change in alcohol use, Change in activity, Upcoming invasive procedure, Emergency department visit, Upcoming dental procedure, Missed doses, Extra doses, Hospital admission, Bruising, Other complaints   Comments:  Started colchicine for gout recently. No DDI noted with warfarin. He is starting to eat some GLV. He had green beans yesterday. Advised him to avoid cooked greens or dark greens. He verbalized understanding. All other findings negative.     Plan:  1. INR is back WNL today at 2.47. Significant increase after boosted dose. Spoke with Bing Pelaez, PharmD ,and instructed Mr. Brennan to resume maintenance regimen of warfarin 7.5 mg oral daily except 10 mg on TuesThursSat until recheck.   2. Repeat INR in one week, 4/20/22, to ensure WNL.  3. Verbal information provided over the phone. Tito Brennan RBV dosing instructions, expresses understanding by teach back, and has no further questions at this time.    Heather Light, Tommy  4/13/2022  14:47 EDT      I, Dayana Tong, PharmD, have reviewed the note in full and agree with the assessment and plan.  04/14/22  09:55 EDT

## 2022-04-14 NOTE — PROGRESS NOTES
Starting medrol dosepack + cephalexin 500mg TID x7 days. Will repeat INR 4/18    Jordana Desir PharmD.  04/14/22   15:03 EDT

## 2022-04-19 ENCOUNTER — ANTICOAGULATION VISIT (OUTPATIENT)
Dept: PHARMACY | Facility: HOSPITAL | Age: 62
End: 2022-04-19

## 2022-04-19 LAB — INR PPP: 2.85

## 2022-04-19 RX ORDER — WARFARIN SODIUM 7.5 MG/1
TABLET ORAL
Qty: 90 TABLET | Refills: 1 | Status: SHIPPED | OUTPATIENT
Start: 2022-04-19 | End: 2022-07-11 | Stop reason: SDUPTHER

## 2022-04-19 NOTE — PROGRESS NOTES
Anticoagulation Clinic - Remote Progress Note  Remote Lab    Indication: paroxysmal afib   Referring Provider: Ursula  Initial Warfarin Start Date: 18-19 years ago (2001)  Goal INR: 2.0-3.0  Current Drug Interactions: pantaprozole (moderate); Fish oil and black cherry pills occassionally  CHADS-VASc: 3 (HF, HTN)    Diet: some GLV 4/19/22  Alcohol:  no  Tobacco: no  OTC Pain Medication: occationally tylenol for sinus HA    INR History:  Date 3/2/20 3/10 3/16 3/24 4/1 4/10 4/24 5/15 6/11 6/25 7/9 7/30 8/27   Total WeeklyDose 45 mg / 5 days 70mg 57.5mg 60mg 57.5mg 60mg 60mg 60mg 60mg 62.5mg 62.5mg 62.5mg 62.5mg   INR 1.84 4.02 3.0 3.28 2.38 2.71 2.26 2.03 1.96 2.06 2.46 2.20 2.38   Notes postop  1x hold misdose         recv'd 8/28     Date 9/24 10/22 10/29 12/1 12/2 12/7 12/14 1/5/21 2/4 3/7 4/15 4/27 5/12   Total WeeklyDose 62.5mg 62.5mg 62.5mg 62.5mg 62.5mg 55mg 62.5mg 62.5mg 62.5mg 62.5mg 62.5mg 60mg 62.5mg   INR 2.27 2.23 2.10 6.90 3.97 2.52 2.22 2.94 2.46 2.68 2.50 2.31 2.58   Notes  Bactrim; clinda  ??? Lab error?  1x miss recv'd 12/15  recv'd 2/8    1x miss; recv'd 5/13     Date 5/18 6/2 6/11 6/21 7/6 8/3 8/6 8/9 8/16 8/22 9/2 9/13 9/20   Total WeeklyDose 62.5mg 52.5mg 66.25  mg 62.5 mg 62.5 mg 58.75 mg 65mg 47.5mg 65mg 60mg 60mg 60mg 65mg   INR 2.06 2.01 3.26 2.25 2.79 1.22 6.07 1.23 3.39 2.36 2.08 1.63 1.9   Notes  1x miss 1x incr dose   3x hold; 1x incr dose 2x incr dose 2x hold  recv'd 8/23  3x hold; 1x incr dose      Date 9/27 10/12 10/25 11/2 11/11 11/22 12/7 12/28 1/25/22 2/8 2/23 3/3   Total WeeklyDose 65mg 62.5mg 62.5mg 55mg 57.5mg 57.5mg 57.5mg 57.5mg 57.5 mg 57.5 mg 57.5 mg 60 mg   INR 2.4 2.33 3.83 2.40 2.60 1.96 2.30 2.21 2.03 2.49 1.65 2.10   Notes                 Date  4/5 4/13 4/18            Total WeeklyDose 67.5 mg 62.5 mg 60 mg            INR 1.51 2.47 2.85            Notes proced holdx5  Medrol + keflex              * takes warfarin in AM    Phone Interview:  Tablet Strength: 7.5mg and  10mg tablet (12/29/21)  Patient Contact Info: [phone] (mobile); [phone] (home)  Verbal Release Auth: Charo Schneider (mother)  Lab Contact Info: EDUARDO Gonzalez [phone]; fax [phone]     Patient Findings:  Positives:  Change in medications   Negatives:  Signs/symptoms of thrombosis, Signs/symptoms of bleeding, Laboratory test error suspected, Change in health, Change in alcohol use, Change in activity, Upcoming invasive procedure, Emergency department visit, Upcoming dental procedure, Missed doses, Extra doses, Change in diet/appetite, Hospital admission, Bruising, Other complaints   Comments:  Took his last dose of Medrol Dosepak this morning. He estimates he has around 4-5 capsules left of Keflex. He has a mixed vegetable frozen pack he plans on eating this week which contains cauliflower, broccoli and carrots. All other findings negative.      Plan:  1. INR was therapeutic yesterday at 2.85. Spoke with Leyla Saba, PharmD ,and instructed Mr. Brennan to reduce Wednesday to 3.75 mg then otherwise continue maintenance dose of warfarin 7.5 mg daily except 10 mg on TuesThursSat until recheck only.   2. Repeat INR this Friday, 4/22/22, due to current DDI with warfarin.  3. Verbal information provided over the phone. Tito Brennan RBV dosing instructions, expresses understanding by teach back, and has no further questions at this time.    Heather Light CPhT  4/19/2022  08:04 EDT    I, Andrew Karimi, PharmD, have reviewed the note in full and agree with the assessment and plan.  04/19/22  14:05 EDT

## 2022-04-22 ENCOUNTER — TELEPHONE (OUTPATIENT)
Dept: PHARMACY | Facility: HOSPITAL | Age: 62
End: 2022-04-22

## 2022-04-22 NOTE — TELEPHONE ENCOUNTER
Mr Brennan called to check on his INR drawn at [facility] today. We had not received results in the clinic. Called lab at [facility] and they reported an initial INR of 14.1, repeat test resulted at 0.9. Instructed Mr Brennan to take his warfarin as directed and follow up with another lab draw on 4/25 after his MD appointment.

## 2022-04-25 ENCOUNTER — ANTICOAGULATION VISIT (OUTPATIENT)
Dept: PHARMACY | Facility: HOSPITAL | Age: 62
End: 2022-04-25

## 2022-04-25 LAB — INR PPP: 3.68

## 2022-04-25 NOTE — PROGRESS NOTES
Anticoagulation Clinic - Remote Progress Note  Remote Lab    Indication: paroxysmal afib   Referring Provider: Ursula  Initial Warfarin Start Date: 18-19 years ago (2001)  Goal INR: 2.0-3.0  Current Drug Interactions: pantaprozole (moderate); Fish oil and black cherry pills occassionally  CHADS-VASc: 3 (HF, HTN)    Diet: some GLV 4/19/22  Alcohol:  no  Tobacco: no  OTC Pain Medication: occationally tylenol for sinus HA    INR History:  Date 3/2/20 3/10 3/16 3/24 4/1 4/10 4/24 5/15 6/11 6/25 7/9 7/30 8/27   Total WeeklyDose 45 mg / 5 days 70mg 57.5mg 60mg 57.5mg 60mg 60mg 60mg 60mg 62.5mg 62.5mg 62.5mg 62.5mg   INR 1.84 4.02 3.0 3.28 2.38 2.71 2.26 2.03 1.96 2.06 2.46 2.20 2.38   Notes postop  1x hold misdose         recv'd 8/28     Date 9/24 10/22 10/29 12/1 12/2 12/7 12/14 1/5/21 2/4 3/7 4/15 4/27 5/12   Total WeeklyDose 62.5mg 62.5mg 62.5mg 62.5mg 62.5mg 55mg 62.5mg 62.5mg 62.5mg 62.5mg 62.5mg 60mg 62.5mg   INR 2.27 2.23 2.10 6.90 3.97 2.52 2.22 2.94 2.46 2.68 2.50 2.31 2.58   Notes  Bactrim; clinda  ??? Lab error?  1x miss recv'd 12/15  recv'd 2/8    1x miss; recv'd 5/13     Date 5/18 6/2 6/11 6/21 7/6 8/3 8/6 8/9 8/16 8/22 9/2 9/13 9/20   Total WeeklyDose 62.5mg 52.5mg 66.25  mg 62.5 mg 62.5 mg 58.75 mg 65mg 47.5mg 65mg 60mg 60mg 60mg 65mg   INR 2.06 2.01 3.26 2.25 2.79 1.22 6.07 1.23 3.39 2.36 2.08 1.63 1.9   Notes  1x miss 1x incr dose   3x hold; 1x incr dose 2x incr dose 2x hold  recv'd 8/23  3x hold; 1x incr dose      Date 9/27 10/12 10/25 11/2 11/11 11/22 12/7 12/28 1/25/22 2/8 2/23 3/3   Total WeeklyDose 65mg 62.5mg 62.5mg 55mg 57.5mg 57.5mg 57.5mg 57.5mg 57.5 mg 57.5 mg 57.5 mg 60 mg   INR 2.4 2.33 3.83 2.40 2.60 1.96 2.30 2.21 2.03 2.49 1.65 2.10   Notes                 Date  4/5 4/13 4/18 4/25           Total WeeklyDose 67.5 mg 62.5 mg 60 mg 56.25           INR 1.51 2.47 2.85 3.68           Notes proced holdx5  Medrol + keflex Medrol + keflex             * takes warfarin in AM    Phone  Interview:  Tablet Strength: 7.5mg and 10mg tablet (12/29/21)  Patient Contact Info: [phone] (mobile); [phone] (home)  Verbal Release Auth: Charo Schneider (mother)  Lab Contact Info: EDUARDO Gonzalez [phone]; fax [phone]     Patient Findings:  Positives:  Change in medications   Negatives:  Signs/symptoms of thrombosis, Signs/symptoms of bleeding, Laboratory test error suspected, Change in health, Change in alcohol use, Change in activity, Upcoming invasive procedure, Emergency department visit, Upcoming dental procedure, Missed doses, Extra doses, Change in diet/appetite, Hospital admission, Bruising, Other complaints   Comments:  Finished keflex/prednisone at the end of last week. Believes he is going to start a new pain medicine-- will call clinic with medication name         Plan:  1. INR is supratherapeutic at 3.68. Considering he has already taken today's dose, instructed Mr. Brennan to reduce dose tomorrow to 3.75 mg then otherwise continue maintenance dose of warfarin 7.5 mg daily except 10 mg on TuesThursSat until recheck only.   2. Repeat INR 5/2 to ensure WNL  3. Verbal information provided over the phone. Tito Brennan RBV dosing instructions, expresses understanding by teach back, and has no further questions at this time.    Jordana Desir, PharmD.  04/25/22   09:21 EDT      Starting Norco 5/325mg BID x7 prn pain from bone spur foot   Jordana Desir, PharmD.  04/25/22   09:56 EDT

## 2022-04-28 ENCOUNTER — TELEPHONE (OUTPATIENT)
Dept: PHARMACY | Facility: HOSPITAL | Age: 62
End: 2022-04-28

## 2022-04-28 NOTE — TELEPHONE ENCOUNTER
Patient calls today to report he has been prescribed diclofenac 1% gel to use for foot pain BID.    He has been made aware of the DDI with warfarin and plans to check and previously advised on Monday, 5/2/22.    He has no further questions at this time.

## 2022-05-03 ENCOUNTER — ANTICOAGULATION VISIT (OUTPATIENT)
Dept: PHARMACY | Facility: HOSPITAL | Age: 62
End: 2022-05-03

## 2022-05-03 LAB — INR PPP: 3.33

## 2022-05-03 NOTE — PROGRESS NOTES
Anticoagulation Clinic - Remote Progress Note  Remote Lab    Indication: paroxysmal afib   Referring Provider: Ursula  Initial Warfarin Start Date: 18-19 years ago (2001)  Goal INR: 2.0-3.0  Current Drug Interactions: pantaprozole (moderate); Fish oil and black cherry pills occassionally  CHADS-VASc: 3 (HF, HTN)    Diet: some GLV 5/3/22  Alcohol:  no  Tobacco: no  OTC Pain Medication: occationally tylenol for sinus HA    INR History:  Date 3/2/20 3/10 3/16 3/24 4/1 4/10 4/24 5/15 6/11 6/25 7/9 7/30 8/27   Total WeeklyDose 45 mg / 5 days 70mg 57.5mg 60mg 57.5mg 60mg 60mg 60mg 60mg 62.5mg 62.5mg 62.5mg 62.5mg   INR 1.84 4.02 3.0 3.28 2.38 2.71 2.26 2.03 1.96 2.06 2.46 2.20 2.38   Notes postop  1x hold misdose         recv'd 8/28     Date 9/24 10/22 10/29 12/1 12/2 12/7 12/14 1/5/21 2/4 3/7 4/15 4/27 5/12   Total WeeklyDose 62.5mg 62.5mg 62.5mg 62.5mg 62.5mg 55mg 62.5mg 62.5mg 62.5mg 62.5mg 62.5mg 60mg 62.5mg   INR 2.27 2.23 2.10 6.90 3.97 2.52 2.22 2.94 2.46 2.68 2.50 2.31 2.58   Notes  Bactrim; clinda  ??? Lab error?  1x miss recv'd 12/15  recv'd 2/8    1x miss; recv'd 5/13     Date 5/18 6/2 6/11 6/21 7/6 8/3 8/6 8/9 8/16 8/22 9/2 9/13 9/20   Total WeeklyDose 62.5mg 52.5mg 66.25  mg 62.5 mg 62.5 mg 58.75 mg 65mg 47.5mg 65mg 60mg 60mg 60mg 65mg   INR 2.06 2.01 3.26 2.25 2.79 1.22 6.07 1.23 3.39 2.36 2.08 1.63 1.9   Notes  1x miss 1xboost   3x hold; 1xboost 2xboost 2x hold  recv'd 8/23  3x hold; 1xboost      Date 9/27 10/12 10/25 11/2 11/11 11/22 12/7 12/28 1/25/22 2/8 2/23 3/3   Total WeeklyDose 65mg 62.5mg 62.5mg 55mg 57.5mg 57.5mg 57.5mg 57.5mg 57.5 mg 57.5 mg 57.5 mg 60 mg   INR 2.4 2.33 3.83 2.40 2.60 1.96 2.30 2.21 2.03 2.49 1.65 2.10   Notes                 Date  4/5 4/13 4/18 4/25 5/3          Total WeeklyDose 67.5 mg 62.5 mg 60 mg 56.25 53.75 mg 55 mg         INR 1.51 2.47 2.85 3.68 3.33          Notes proced holdx5  Medrol + keflex Medrol + keflex voltaren gel, norco redx2           * takes warfarin in  AM    Phone Interview:  Tablet Strength: 7.5mg and 10mg tablet (12/29/21)  Patient Contact Info: [phone] (mobile); [phone] (home)  Verbal Release Auth: Charo Schneider (mother)  Lab Contact Info: EDUARDO Gonzalez [phone]; fax [phone]     Patient Findings:  Positives:  Change in medications   Negatives:  Signs/symptoms of thrombosis, Signs/symptoms of bleeding, Laboratory test error suspected, Change in health, Change in alcohol use, Change in activity, Upcoming invasive procedure, Emergency department visit, Upcoming dental procedure, Missed doses, Extra doses, Change in diet/appetite, Hospital admission, Bruising, Other complaints   Comments:  Called last week to report he is using diclofenac gel BID on his foot for bone spur. Verified he is using BID and he states he is only taking  Norco every 2-3 days PRN. All other findings negative.     Plan:  1. INR is supratherapeutic this morning at 3.33, yet improving. Considering he has already taken today's dose, instructed Mr. Brennan to reduce regimen to warfarin 7.5 mg oral daily until recheck only.   His usual maintenance regimen is warfarin 7.5 mg oral daily except 10 mg on TueThurSat.   2. Repeat INR in one week, 5/9/22.  3. Verbal information provided over the phone. Tito Brennan RBV dosing instructions, expresses understanding by teach back, and has no further questions at this time.    Heather Light CPhT  5/3/2022  09:17 EDT    I, Bing Flynn, PharmD, have reviewed the note in full and agree with the assessment and plan.  Continue to evaluate warfarin dosing needs.    05/03/22  11:12 EDT

## 2022-05-10 ENCOUNTER — ANTICOAGULATION VISIT (OUTPATIENT)
Dept: PHARMACY | Facility: HOSPITAL | Age: 62
End: 2022-05-10

## 2022-05-10 LAB — INR PPP: 2.36

## 2022-05-10 NOTE — PROGRESS NOTES
Anticoagulation Clinic - Remote Progress Note  Remote Lab    Indication: paroxysmal afib   Referring Provider: Ursula  Initial Warfarin Start Date: 18-19 years ago (2001)  Goal INR: 2.0-3.0  Current Drug Interactions: pantaprozole (moderate); Fish oil and black cherry pills occassionally  CHADS-VASc: 3 (HF, HTN)    Diet: some GLV 5/10/22  Alcohol:  no  Tobacco: no  OTC Pain Medication: occationally tylenol for sinus HA    INR History:  Date 3/2/20 3/10 3/16 3/24 4/1 4/10 4/24 5/15 6/11 6/25 7/9 7/30 8/27   Total WeeklyDose 45 mg / 5 days 70mg 57.5mg 60mg 57.5mg 60mg 60mg 60mg 60mg 62.5mg 62.5mg 62.5mg 62.5mg   INR 1.84 4.02 3.0 3.28 2.38 2.71 2.26 2.03 1.96 2.06 2.46 2.20 2.38   Notes postop  1x hold misdose         recv'd 8/28     Date 9/24 10/22 10/29 12/1 12/2 12/7 12/14 1/5/21 2/4 3/7 4/15 4/27 5/12   Total WeeklyDose 62.5mg 62.5mg 62.5mg 62.5mg 62.5mg 55mg 62.5mg 62.5mg 62.5mg 62.5mg 62.5mg 60mg 62.5mg   INR 2.27 2.23 2.10 6.90 3.97 2.52 2.22 2.94 2.46 2.68 2.50 2.31 2.58   Notes  Bactrim; clinda  ??? Lab error?  1x miss recv'd 12/15  recv'd 2/8    1x miss; recv'd 5/13     Date 5/18 6/2 6/11 6/21 7/6 8/3 8/6 8/9 8/16 8/22 9/2 9/13 9/20   Total WeeklyDose 62.5mg 52.5mg 66.25  mg 62.5 mg 62.5 mg 58.75 mg 65mg 47.5mg 65mg 60mg 60mg 60mg 65mg   INR 2.06 2.01 3.26 2.25 2.79 1.22 6.07 1.23 3.39 2.36 2.08 1.63 1.9   Notes  1x miss 1xboost   3x hold; 1xboost 2xboost 2x hold  recv'd 8/23  3x hold; 1xboost      Date 9/27 10/12 10/25 11/2 11/11 11/22 12/7 12/28 1/25/22 2/8 2/23 3/3   Total WeeklyDose 65mg 62.5mg 62.5mg 55mg 57.5mg 57.5mg 57.5mg 57.5mg 57.5 mg 57.5 mg 57.5 mg 60 mg   INR 2.4 2.33 3.83 2.40 2.60 1.96 2.30 2.21 2.03 2.49 1.65 2.10   Notes                 Date  4/5 4/13 4/18 4/25 5/3 5/10         Total WeeklyDose 67.5 mg 62.5 mg 60 mg 56.25 53.75 mg 55 mg 57.5 mg        INR 1.51 2.47 2.85 3.68 3.33 2.36         Notes proced holdx5  Medrol + keflex Medrol + keflex voltaren gel, norco redx2           * takes  warfarin in AM    Phone Interview:  Tablet Strength: 7.5mg and 10mg tablet (12/29/21)  Patient Contact Info: [phone] (mobile); [phone] (home)  Verbal Release Auth: Charo Schneider (mother)  Lab Contact Info: EDUARDO Gonzalez [phone]; fax [phone]     Patient Findings:  Negatives:  Signs/symptoms of thrombosis, Signs/symptoms of bleeding, Laboratory test error suspected, Change in health, Change in alcohol use, Change in activity, Upcoming invasive procedure, Emergency department visit, Upcoming dental procedure, Missed doses, Extra doses, Change in medications, Change in diet/appetite, Hospital admission, Bruising, Other complaints   Comments:  He reports he doesn't feel the diclofenac gel is helping his foot so he has quit using it. The gel is making his foot turn red and he worries about his blood being too thin in that area. He has only taken 1-2 Norco tablets in the last week. All other findings negative.     Plan:  1. INR is back WNL today at 2.36 following two dose reductions. At this time, instructed Mr. Brennan to take warfarin 7.5 mg daily except 10 mg on TuesSat until recheck only.   His usual maintenance regimen is warfarin 7.5 mg oral daily except 10 mg on TueThurSat.   2. Repeat INR on Thursday, 5/19/22.  3. Verbal information provided over the phone. Tito Brennan RBV dosing instructions, expresses understanding by teach back, and has no further questions at this time.    Heather Light CPhT  5/10/2022  12:01 EDT    57.5 mg next week  I, Andrew Karimi, PharmD, have reviewed the note in full and agree with the assessment and plan.  05/10/22  15:24 EDT

## 2022-05-18 NOTE — PROGRESS NOTES
Called EDUARDO Gonzalez and INR will likely not be available until tomorrow (5/19). Patient takes warfarin at 0630 before going to work. He is still using diclofenac gel. Per Leyla Saba, PharmD, have instructed Mr. Brennan to take warfarin 7.5 mg tomorrow morning and we will call him with INR result once received.     Heather Light, Tommy  5/18/2022  16:24 EDT

## 2022-05-19 ENCOUNTER — ANTICOAGULATION VISIT (OUTPATIENT)
Dept: PHARMACY | Facility: HOSPITAL | Age: 62
End: 2022-05-19

## 2022-05-19 LAB — INR PPP: 2.27

## 2022-05-19 NOTE — PROGRESS NOTES
Anticoagulation Clinic - Remote Progress Note  Remote Lab    Indication: paroxysmal afib   Referring Provider: Ursula  Initial Warfarin Start Date: 18-19 years ago (2001)  Goal INR: 2.0-3.0  Current Drug Interactions: pantaprozole (moderate); Fish oil and black cherry pills occassionally  CHADS-VASc: 3 (HF, HTN)    Diet: some GLV (green beans etc-no cooked greens) 5/19/22  Alcohol:  no  Tobacco: no  OTC Pain Medication: occationally tylenol for sinus HA    INR History:  Date 3/2/20 3/10 3/16 3/24 4/1 4/10 4/24 5/15 6/11 6/25 7/9 7/30 8/27   Total WeeklyDose 45 mg / 5 days 70mg 57.5mg 60mg 57.5mg 60mg 60mg 60mg 60mg 62.5mg 62.5mg 62.5mg 62.5mg   INR 1.84 4.02 3.0 3.28 2.38 2.71 2.26 2.03 1.96 2.06 2.46 2.20 2.38   Notes postop  1x hold misdose         recv'd 8/28     Date 9/24 10/22 10/29 12/1 12/2 12/7 12/14 1/5/21 2/4 3/7 4/15 4/27 5/12   Total WeeklyDose 62.5mg 62.5mg 62.5mg 62.5mg 62.5mg 55mg 62.5mg 62.5mg 62.5mg 62.5mg 62.5mg 60mg 62.5mg   INR 2.27 2.23 2.10 6.90 3.97 2.52 2.22 2.94 2.46 2.68 2.50 2.31 2.58   Notes  Bactrim; clinda  ??? Lab error?  1x miss recv'd 12/15  recv'd 2/8    1x miss; recv'd 5/13     Date 5/18 6/2 6/11 6/21 7/6 8/3 8/6 8/9 8/16 8/22 9/2 9/13 9/20   Total WeeklyDose 62.5mg 52.5mg 66.25  mg 62.5 mg 62.5 mg 58.75 mg 65mg 47.5mg 65mg 60mg 60mg 60mg 65mg   INR 2.06 2.01 3.26 2.25 2.79 1.22 6.07 1.23 3.39 2.36 2.08 1.63 1.9   Notes  1x miss 1xboost   3x hold; 1xboost 2xboost 2x hold  recv'd 8/23  3x hold; 1xboost      Date 9/27 10/12 10/25 11/2 11/11 11/22 12/7 12/28 1/25/22 2/8 2/23 3/3   Total WeeklyDose 65mg 62.5mg 62.5mg 55mg 57.5mg 57.5mg 57.5mg 57.5mg 57.5 mg 57.5 mg 57.5 mg 60 mg   INR 2.4 2.33 3.83 2.40 2.60 1.96 2.30 2.21 2.03 2.49 1.65 2.10   Notes                 Date  4/5 4/13 4/18 4/25 5/3 5/10 5/18        Total WeeklyDose 67.5 mg 62.5 mg 60 mg 56.25 53.75 mg 55 mg 57.5 mg        INR 1.51 2.47 2.85 3.68 3.33 2.36 2.27        Notes proced holdx5  Medrol + keflex Medrol + keflex  voltaren gel, norco redx2 rec 5/19          * takes warfarin in AM    Phone Interview:  Tablet Strength: 7.5mg and 10mg tablet (12/29/21)  Patient Contact Info: [phone] (mobile); [phone] (home)  Verbal Release Auth: Charo Schneider (mother)  Lab Contact Info: EDUARDO Gonzalez [phone]; fax [phone]     Patient Findings:  Negatives:  Signs/symptoms of thrombosis, Signs/symptoms of bleeding, Laboratory test error suspected, Change in health, Change in alcohol use, Change in activity, Upcoming invasive procedure, Emergency department visit, Upcoming dental procedure, Missed doses, Extra doses, Change in medications, Change in diet/appetite, Hospital admission, Bruising, Other complaints   Comments:  He has a follow up appointment regarding his foot on Tuesday. He was advised to call the clinic if any changes are made or any procedues scheduled. He states he is still using diclofenac gel sometimes.     Plan:  1. INR was therapeutic yesterday at 2.27. Results received today.  At this time, instructed Mr. Brennan to continue warfarin 7.5 mg oral daily except 10 mg on TuesSat until recheck.  His usual maintenance regimen is warfarin 7.5 mg oral daily except 10 mg on TueThurSat.   2. Repeat INR in two weeks, 6/1/22.  3. Verbal information provided over the phone. Tito Brennan RBV dosing instructions, expresses understanding by teach back, and has no further questions at this time.    Heather Light, Tommy  5/19/2022  14:50 EDT    I, Bing Flynn, PharmD, have reviewed the note in full and agree with the assessment and plan.  05/19/22  15:09 EDT

## 2022-06-02 ENCOUNTER — ANTICOAGULATION VISIT (OUTPATIENT)
Dept: PHARMACY | Facility: HOSPITAL | Age: 62
End: 2022-06-02

## 2022-06-02 LAB — INR PPP: 2.45

## 2022-06-02 NOTE — PROGRESS NOTES
Anticoagulation Clinic - Remote Progress Note  Remote Lab    Indication: paroxysmal afib   Referring Provider: Ursula  Initial Warfarin Start Date: 18-19 years ago (2001)  Goal INR: 2.0-3.0  Current Drug Interactions: pantaprozole (moderate); Fish oil and black cherry pills occassionally  CHADS-VASc: 3 (HF, HTN)    Diet: some GLV (green beans etc-no cooked greens) 5/19/22  Alcohol:  no  Tobacco: no  OTC Pain Medication: occationally tylenol for sinus HA    INR History:  Date 3/2/20 3/10 3/16 3/24 4/1 4/10 4/24 5/15 6/11 6/25 7/9 7/30 8/27   Total WeeklyDose 45 mg / 5 days 70mg 57.5mg 60mg 57.5mg 60mg 60mg 60mg 60mg 62.5mg 62.5mg 62.5mg 62.5mg   INR 1.84 4.02 3.0 3.28 2.38 2.71 2.26 2.03 1.96 2.06 2.46 2.20 2.38   Notes postop  1x hold misdose         recv'd 8/28     Date 9/24 10/22 10/29 12/1 12/2 12/7 12/14 1/5/21 2/4 3/7 4/15 4/27 5/12   Total WeeklyDose 62.5mg 62.5mg 62.5mg 62.5mg 62.5mg 55mg 62.5mg 62.5mg 62.5mg 62.5mg 62.5mg 60mg 62.5mg   INR 2.27 2.23 2.10 6.90 3.97 2.52 2.22 2.94 2.46 2.68 2.50 2.31 2.58   Notes  Bactrim; clinda  ??? Lab error?  1x miss recv'd 12/15  recv'd 2/8    1x miss; recv'd 5/13     Date 5/18 6/2 6/11 6/21 7/6 8/3 8/6 8/9 8/16 8/22 9/2 9/13 9/20   Total WeeklyDose 62.5mg 52.5mg 66.25  mg 62.5 mg 62.5 mg 58.75 mg 65mg 47.5mg 65mg 60mg 60mg 60mg 65mg   INR 2.06 2.01 3.26 2.25 2.79 1.22 6.07 1.23 3.39 2.36 2.08 1.63 1.9   Notes  1x miss 1xboost   3x hold; 1xboost 2xboost 2x hold  recv'd 8/23  3x hold; 1xboost      Date 9/27 10/12 10/25 11/2 11/11 11/22 12/7 12/28 1/25/22 2/8 2/23 3/3   Total WeeklyDose 65mg 62.5mg 62.5mg 55mg 57.5mg 57.5mg 57.5mg 57.5mg 57.5 mg 57.5 mg 57.5 mg 60 mg   INR 2.4 2.33 3.83 2.40 2.60 1.96 2.30 2.21 2.03 2.49 1.65 2.10   Notes                 Date  4/5 4/13 4/18 4/25 5/3 5/10 5/18 6/2       Total WeeklyDose 67.5 mg 62.5 mg 60 mg 56.25 53.75 mg 55 mg 57.5 mg 57.5       INR 1.51 2.47 2.85 3.68 3.33 2.36 2.27 2.45       Notes proced holdx5  Medrol + keflex  Medrol + keflex voltaren gel, norco redx2 rec 5/19          * takes warfarin in AM    Phone Interview:  Tablet Strength: 7.5mg and 10mg tablet (12/29/21)  Patient Contact Info: [phone] (mobile); [phone] (home)  Verbal Release Auth: Charo Schneider (mother)  Lab Contact Info: EDUARDO Gonzalez [phone]; fax [phone]    [facility] 6/2/2022 @ 10:13 EDT  *DISREGARD FOLLOWING UNTIL PT IS CONTACTED*    Patient Findings:  Negatives:  Signs/symptoms of thrombosis, Signs/symptoms of bleeding, Laboratory test error suspected, Change in health, Change in alcohol use, Change in activity, Upcoming invasive procedure, Emergency department visit, Upcoming dental procedure, Missed doses, Extra doses, Change in medications, Change in diet/appetite, Hospital admission, Bruising, Other complaints   Comments:  He has a follow up appointment regarding his foot on Tuesday. He was advised to call the clinic if any changes are made or any procedues scheduled. He states he is still using diclofenac gel sometimes.     Plan:  1. INR was therapeutic yesterday at 2.27. Results received today.  At this time, instructed Mr. Brennan to continue warfarin 7.5 mg oral daily except 10 mg on TuesSat until recheck.  His usual maintenance regimen is warfarin 7.5 mg oral daily except 10 mg on TueThurSat.   2. Repeat INR in two weeks, 6/1/22.  3. Verbal information provided over the phone. Tito BROWNING Brookschaitanya RBV dosing instructions, expresses understanding by teach back, and has no further questions at this time.

## 2022-06-02 NOTE — PROGRESS NOTES
Anticoagulation Clinic - Remote Progress Note  Remote Lab    Indication: paroxysmal afib   Referring Provider: Ursula  Initial Warfarin Start Date: 18-19 years ago (2001)  Goal INR: 2.0-3.0  Current Drug Interactions: pantaprozole (moderate); Fish oil and black cherry pills occassionally  CHADS-VASc: 3 (HF, HTN)    Diet: some GLV (green beans etc-no cooked greens) 5/19/22  Alcohol:  no  Tobacco: no  OTC Pain Medication: occationally tylenol for sinus HA    INR History:  Date 3/2/20 3/10 3/16 3/24 4/1 4/10 4/24 5/15 6/11 6/25 7/9 7/30 8/27   Total WeeklyDose 45 mg / 5 days 70mg 57.5mg 60mg 57.5mg 60mg 60mg 60mg 60mg 62.5mg 62.5mg 62.5mg 62.5mg   INR 1.84 4.02 3.0 3.28 2.38 2.71 2.26 2.03 1.96 2.06 2.46 2.20 2.38   Notes postop  1x hold misdose         recv'd 8/28     Date 9/24 10/22 10/29 12/1 12/2 12/7 12/14 1/5/21 2/4 3/7 4/15 4/27 5/12   Total WeeklyDose 62.5mg 62.5mg 62.5mg 62.5mg 62.5mg 55mg 62.5mg 62.5mg 62.5mg 62.5mg 62.5mg 60mg 62.5mg   INR 2.27 2.23 2.10 6.90 3.97 2.52 2.22 2.94 2.46 2.68 2.50 2.31 2.58   Notes  Bactrim; clinda  ??? Lab error?  1x miss recv'd 12/15  recv'd 2/8    1x miss; recv'd 5/13     Date 5/18 6/2 6/11 6/21 7/6 8/3 8/6 8/9 8/16 8/22 9/2 9/13 9/20   Total WeeklyDose 62.5mg 52.5mg 66.25  mg 62.5 mg 62.5 mg 58.75 mg 65mg 47.5mg 65mg 60mg 60mg 60mg 65mg   INR 2.06 2.01 3.26 2.25 2.79 1.22 6.07 1.23 3.39 2.36 2.08 1.63 1.9   Notes  1x miss 1xboost   3x hold; 1xboost 2xboost 2x hold  recv'd 8/23  3x hold; 1xboost      Date 9/27 10/12 10/25 11/2 11/11 11/22 12/7 12/28 1/25/22 2/8 2/23 3/3   Total WeeklyDose 65mg 62.5mg 62.5mg 55mg 57.5mg 57.5mg 57.5mg 57.5mg 57.5 mg 57.5 mg 57.5 mg 60 mg   INR 2.4 2.33 3.83 2.40 2.60 1.96 2.30 2.21 2.03 2.49 1.65 2.10   Notes                 Date  4/5 4/13 4/18 4/25 5/3 5/10 5/18 6/2       Total WeeklyDose 67.5 mg 62.5 mg 60 mg 56.25 53.75 mg 55 mg 57.5 mg 57.5 mg       INR 1.51 2.47 2.85 3.68 3.33 2.36 2.27 2.45       Notes proced holdx5  Medrol + keflex  Medrol + keflex voltaren gel, norco redx2 rec 5/19  Mis-dose        * takes warfarin in AM    Phone Interview:  Tablet Strength: 7.5mg and 10mg tablet (12/29/21)  Patient Contact Info: [phone] (mobile); [phone] (home)  Verbal Release Auth: Charo Schneider (mother)  Lab Contact Info: EDUARDO Gonzalez [phone]; fax [phone]     Patient Findings:  Negatives:  Signs/symptoms of thrombosis, Signs/symptoms of bleeding, Laboratory test error suspected, Change in health, Change in alcohol use, Change in activity, Upcoming invasive procedure, Emergency department visit, Upcoming dental procedure, Missed doses, Extra doses, Change in medications, Change in diet/appetite, Hospital admission, Bruising, Other complaints   Comments:  Accidentally took 10 mg this morning versus intended 7.5 mg. Will lower Saturday's dose from 10 mg to 7.5 mg to equal same weekly dose this week (see tracker).     Still using diclofenac gel once daily in the morning on his foot. All other findings negative.     Plan:  1. INR was therapeutic yesterday at 2.27. Results received today.  Patient mis-dosed this morning (see tracker). At this time, instructed Mr. Brennan to lower Saturday's dose to 7.5 mg then next week continue warfarin 7.5 mg oral daily except 10 mg on TuesSat until recheck.  2. Repeat INR in two weeks, 6/16/22.  3. Verbal information provided over the phone. Tito Brennan RBV dosing instructions, expresses understanding by teach back, and has no further questions at this time.    Heather Light, Tommy  6/2/2022  14:08 EDT    I, Jordana Desir, PharmD, have reviewed the note in full and agree with the assessment and plan.  06/02/22  15:03 EDT

## 2022-06-08 ENCOUNTER — ANTICOAGULATION VISIT (OUTPATIENT)
Dept: PHARMACY | Facility: HOSPITAL | Age: 62
End: 2022-06-08

## 2022-06-08 LAB — INR PPP: 2.4

## 2022-06-08 NOTE — PROGRESS NOTES
Anticoagulation Clinic - Remote Progress Note  Remote Lab    Indication: paroxysmal afib   Referring Provider: Ursula  Initial Warfarin Start Date: 18-19 years ago (2001)  Goal INR: 2.0-3.0  Current Drug Interactions: pantaprozole (moderate); Fish oil and black cherry pills occassionally  CHADS-VASc: 3 (HF, HTN)    Diet: some GLV (green beans etc-no cooked greens) 6/8/22  Alcohol:  no  Tobacco: no  OTC Pain Medication: occationally tylenol for sinus HA    INR History:  Date 3/2/20 3/10 3/16 3/24 4/1 4/10 4/24 5/15 6/11 6/25 7/9 7/30 8/27   Total WeeklyDose 45 mg / 5 days 70mg 57.5mg 60mg 57.5mg 60mg 60mg 60mg 60mg 62.5mg 62.5mg 62.5mg 62.5mg   INR 1.84 4.02 3.0 3.28 2.38 2.71 2.26 2.03 1.96 2.06 2.46 2.20 2.38   Notes postop  1x hold misdose         recv'd 8/28     Date 9/24 10/22 10/29 12/1 12/2 12/7 12/14 1/5/21 2/4 3/7 4/15 4/27 5/12   Total WeeklyDose 62.5mg 62.5mg 62.5mg 62.5mg 62.5mg 55mg 62.5mg 62.5mg 62.5mg 62.5mg 62.5mg 60mg 62.5mg   INR 2.27 2.23 2.10 6.90 3.97 2.52 2.22 2.94 2.46 2.68 2.50 2.31 2.58   Notes  Bactrim; clinda  ??? Lab error?  1x miss recv'd 12/15  recv'd 2/8    1x miss; recv'd 5/13     Date 5/18 6/2 6/11 6/21 7/6 8/3 8/6 8/9 8/16 8/22 9/2 9/13 9/20   Total WeeklyDose 62.5mg 52.5mg 66.25  mg 62.5 mg 62.5 mg 58.75 mg 65mg 47.5mg 65mg 60mg 60mg 60mg 65mg   INR 2.06 2.01 3.26 2.25 2.79 1.22 6.07 1.23 3.39 2.36 2.08 1.63 1.9   Notes  1x miss 1xboost   3x hold; 1xboost 2xboost 2x hold  recv'd 8/23  3x hold; 1xboost      Date 9/27 10/12 10/25 11/2 11/11 11/22 12/7 12/28 1/25/22 2/8 2/23 3/3   Total WeeklyDose 65mg 62.5mg 62.5mg 55mg 57.5mg 57.5mg 57.5mg 57.5mg 57.5 mg 57.5 mg 57.5 mg 60 mg   INR 2.4 2.33 3.83 2.40 2.60 1.96 2.30 2.21 2.03 2.49 1.65 2.10   Notes                 Date  4/5 4/13 4/18 4/25 5/3 5/10 5/18 6/2 6/8      Total WeeklyDose 67.5 mg 62.5 mg 60 mg 56.25 53.75 mg 55 mg 57.5 mg 57.5 mg 57.5 mg      INR 1.51 2.47 2.85 3.68 3.33 2.36 2.27 2.45 2.40      Notes proced holdx5   Medrol + keflex Medrol + keflex voltaren gel, norco redx2 rec 5/19  Mis-dose        * takes warfarin in AM    Phone Interview:  Tablet Strength: 7.5mg and 10mg tablet (12/29/21)  Patient Contact Info: [phone] (mobile); [phone] (home)  Verbal Release Auth: Charo Schneider (mother)  Lab Contact Info: EDUARDO Gonzalez [phone]; fax [phone]     Patient Findings:  Negatives:  Signs/symptoms of thrombosis, Signs/symptoms of bleeding, Laboratory test error suspected, Change in health, Change in alcohol use, Change in activity, Upcoming invasive procedure, Emergency department visit, Upcoming dental procedure, Missed doses, Extra doses, Change in medications, Change in diet/appetite, Hospital admission, Bruising, Other complaints   Comments:  Confirmed dosing for the past week with patient. He is scheduled to have imaging of his foot done tomorrow. He will call the clinic if any interventions are required. Still using diclofenac gel on his foot once daily in the morning.     Plan:  1. INR is therapeutic this morning at 2.40; patient mis-dosed last week (see tracker). Instructed Mr. Brennan to resume warfarin 7.5 mg oral daily except 10 mg on TuesSat until recheck.  2. Repeat INR in 4 weeks, 7/6/22.  3. Verbal information provided over the phone. Tito Brennan RBV dosing instructions, expresses understanding by teach back, and has no further questions at this time.    Heather Light CPhT  6/8/2022   13:42 EDT    I, Scotty Green, LazaroD, have reviewed the note in full and agree with the assessment and plan.  6/8/2022  15:49 EDT

## 2022-06-09 ENCOUNTER — TELEPHONE (OUTPATIENT)
Dept: PHARMACY | Facility: HOSPITAL | Age: 62
End: 2022-06-09

## 2022-06-09 NOTE — TELEPHONE ENCOUNTER
Patient called to report that his gout medication is planning to be switched from colchicine to allopurinol although his physician, Dr Huerta with [facility] wanted to make sure there was no significant interaction with his warfarin prior to prescribing. Discussed with patient that allopurinol has a potential to increase INR but that it can be prescribed with warfarin as long as INR is monitored. Will move up next INR check from 7/6/22 (4 weeks) to 6/23/22 (2 weeks) to ensure no significant impact on INR from new DDI. Called Dr Huerta's office ([phone]) at patient's request to inform them of fact that warfarin can be prescribed with warfarin as long as INR is monitored closely and could only leave a message. Called Mr Brennan back to inform him I left message with office and if he has not heard anything to call Dr Huerta's office back. He then informed me that his office could be reached by calling the  at [facility] ([phone], option 0). I called this number and never received answer after 5 minutes. Discussed again with patient with instructions to reattempt follow up with Dr Huerta's office if he has not heard back from them.    Moisés Magdaleno, PharmD, BCPS  6/9/2022  15:40 EDT

## 2022-06-13 ENCOUNTER — TELEPHONE (OUTPATIENT)
Dept: CARDIOLOGY | Facility: CLINIC | Age: 62
End: 2022-06-13

## 2022-06-13 NOTE — TELEPHONE ENCOUNTER
Yes, but INR will need to be monitored more frequently when initiating the Allopurinol as it will increase the INR. Reduction in warfarin will likely be needed.

## 2022-06-13 NOTE — TELEPHONE ENCOUNTER
Dr. Huerta would like to know if he can start the patient on Allopurinol since he takes warfarin daily?      (P)[phone].

## 2022-06-14 NOTE — TELEPHONE ENCOUNTER
Patient notified and aware that he can take Allopurinol with warfarin and that he will need to have his INR's checked more frequently.

## 2022-06-14 NOTE — TELEPHONE ENCOUNTER
I tried to call the patient and Dr. Huerta's office. I was unable to reach either of them. I left messages for them to call me back at the office.

## 2022-06-27 ENCOUNTER — ANTICOAGULATION VISIT (OUTPATIENT)
Dept: PHARMACY | Facility: HOSPITAL | Age: 62
End: 2022-06-27

## 2022-06-27 LAB — INR PPP: 2.38

## 2022-06-27 NOTE — PROGRESS NOTES
Anticoagulation Clinic - Remote Progress Note  Remote Lab    Indication: paroxysmal afib   Referring Provider: Ursula  Initial Warfarin Start Date: 18-19 years ago (2001)  Goal INR: 2.0-3.0  Current Drug Interactions: pantaprozole (moderate); Fish oil and black cherry pills occassionally  CHADS-VASc: 3 (HF, HTN)    Diet: some GLV (green beans etc-no cooked greens) 6/27/22  Alcohol:  no  Tobacco: no  OTC Pain Medication: occationally tylenol for sinus HA    INR History:  Date 3/2/20 3/10 3/16 3/24 4/1 4/10 4/24 5/15 6/11 6/25 7/9 7/30 8/27   Total WeeklyDose 45 mg / 5 days 70mg 57.5mg 60mg 57.5mg 60mg 60mg 60mg 60mg 62.5mg 62.5mg 62.5mg 62.5mg   INR 1.84 4.02 3.0 3.28 2.38 2.71 2.26 2.03 1.96 2.06 2.46 2.20 2.38   Notes postop  1x hold misdose         recv'd 8/28     Date 9/24 10/22 10/29 12/1 12/2 12/7 12/14 1/5/21 2/4 3/7 4/15 4/27 5/12   Total WeeklyDose 62.5mg 62.5mg 62.5mg 62.5mg 62.5mg 55mg 62.5mg 62.5mg 62.5mg 62.5mg 62.5mg 60mg 62.5mg   INR 2.27 2.23 2.10 6.90 3.97 2.52 2.22 2.94 2.46 2.68 2.50 2.31 2.58   Notes  Bactrim; clinda  ??? Lab error?  1x miss recv'd 12/15  recv'd 2/8    1x miss; recv'd 5/13     Date 5/18 6/2 6/11 6/21 7/6 8/3 8/6 8/9 8/16 8/22 9/2 9/13 9/20   Total WeeklyDose 62.5mg 52.5mg 66.25  mg 62.5 mg 62.5 mg 58.75 mg 65mg 47.5mg 65mg 60mg 60mg 60mg 65mg   INR 2.06 2.01 3.26 2.25 2.79 1.22 6.07 1.23 3.39 2.36 2.08 1.63 1.9   Notes  1x miss 1xboost   3x hold; 1xboost 2xboost 2x hold  recv'd 8/23  3x hold; 1xboost      Date 9/27 10/12 10/25 11/2 11/11 11/22 12/7 12/28 1/25/22 2/8 2/23 3/3   Total WeeklyDose 65mg 62.5mg 62.5mg 55mg 57.5mg 57.5mg 57.5mg 57.5mg 57.5 mg 57.5 mg 57.5 mg 60 mg   INR 2.4 2.33 3.83 2.40 2.60 1.96 2.30 2.21 2.03 2.49 1.65 2.10   Notes                 Date  4/5 4/13 4/18 4/25 5/3 5/10 5/18 6/2 6/8 6/24     Total WeeklyDose 67.5 mg 62.5 mg 60 mg 56.25 53.75 mg 55 mg 57.5 mg 57.5 mg 57.5 mg 57.5 mg     INR 1.51 2.47 2.85 3.68 3.33 2.36 2.27 2.45 2.40 2.38     Notes  "proced holdx5  Medrol + keflex Medrol + keflex voltaren gel, norco redx2 rec 5/19  Mis-dose rec'd 6/27  allopurinol       * takes warfarin in AM    Phone Interview:  Tablet Strength: 7.5mg and 10mg tablet (12/29/21)  Patient Contact Info: [phone] (mobile); [phone] (home)  Verbal Release Auth: Charo Schneider (mother)  Lab Contact Info: EDUARDO Gonzalez [phone]; fax [phone]     Patient Findings:  Positives:  Change in medications   Negatives:  Signs/symptoms of thrombosis, Signs/symptoms of bleeding, Laboratory test error suspected, Change in health, Change in alcohol use, Change in activity, Upcoming invasive procedure, Emergency department visit, Upcoming dental procedure, Missed doses, Extra doses, Change in diet/appetite, Hospital admission, Bruising, Other complaints   Comments:  Started allopurinol about ~ one week ago. He states he is seeing a \"vein doctor\" soon and may have to have a stent put in his lower leg. He will keep clinic informed. All other findings negative.     Plan:  1. INR was therapeutic Friday afternoon at 2.38; result received this morning. Spoke with Jordana Desir, PharmD, and instructed Mr. Brennan to continue warfarin 7.5 mg oral daily except 10 mg on TuesSat until recheck.  2. Repeat INR in ~ two weeks, 7/7/22.  3. Verbal information provided over the phone. Tito Brennan RBV dosing instructions, expresses understanding by teach back, and has no further questions at this time.    Heather Light CPhT  6/27/2022  08:53 EDT    I, Jordana Desir, PharmD, have reviewed the note in full and agree with the assessment and plan.  06/27/22  11:03 EDT    "

## 2022-06-28 ENCOUNTER — TELEPHONE (OUTPATIENT)
Dept: PHARMACY | Facility: HOSPITAL | Age: 62
End: 2022-06-28

## 2022-07-08 ENCOUNTER — ANTICOAGULATION VISIT (OUTPATIENT)
Dept: PHARMACY | Facility: HOSPITAL | Age: 62
End: 2022-07-08

## 2022-07-08 LAB — INR PPP: 1.98

## 2022-07-08 NOTE — PROGRESS NOTES
Anticoagulation Clinic - Remote Progress Note  Remote Lab    Indication: paroxysmal afib   Referring Provider: Ursula  Initial Warfarin Start Date: 18-19 years ago (2001)  Goal INR: 2.0-3.0  Current Drug Interactions: pantaprozole (moderate); Fish oil and black cherry pills occassionally  CHADS-VASc: 3 (HF, HTN)    Diet: some GLV (green beans etc-no cooked greens) 6/27/22  Alcohol:  no  Tobacco: no  OTC Pain Medication: occationally tylenol for sinus HA    INR History:  Date 3/2/20 3/10 3/16 3/24 4/1 4/10 4/24 5/15 6/11 6/25 7/9 7/30 8/27   Total WeeklyDose 45 mg / 5 days 70mg 57.5mg 60mg 57.5mg 60mg 60mg 60mg 60mg 62.5mg 62.5mg 62.5mg 62.5mg   INR 1.84 4.02 3.0 3.28 2.38 2.71 2.26 2.03 1.96 2.06 2.46 2.20 2.38   Notes postop  1x hold misdose         recv'd 8/28     Date 9/24 10/22 10/29 12/1 12/2 12/7 12/14 1/5/21 2/4 3/7 4/15 4/27 5/12   Total WeeklyDose 62.5mg 62.5mg 62.5mg 62.5mg 62.5mg 55mg 62.5mg 62.5mg 62.5mg 62.5mg 62.5mg 60mg 62.5mg   INR 2.27 2.23 2.10 6.90 3.97 2.52 2.22 2.94 2.46 2.68 2.50 2.31 2.58   Notes  Bactrim; clinda  ??? Lab error?  1x miss recv'd 12/15  recv'd 2/8    1x miss; recv'd 5/13     Date 5/18 6/2 6/11 6/21 7/6 8/3 8/6 8/9 8/16 8/22 9/2 9/13 9/20   Total WeeklyDose 62.5mg 52.5mg 66.25  mg 62.5 mg 62.5 mg 58.75 mg 65mg 47.5mg 65mg 60mg 60mg 60mg 65mg   INR 2.06 2.01 3.26 2.25 2.79 1.22 6.07 1.23 3.39 2.36 2.08 1.63 1.9   Notes  1x miss 1xboost   3x hold; 1xboost 2xboost 2x hold  recv'd 8/23  3x hold; 1xboost      Date 9/27 10/12 10/25 11/2 11/11 11/22 12/7 12/28 1/25/22 2/8 2/23 3/3   Total WeeklyDose 65mg 62.5mg 62.5mg 55mg 57.5mg 57.5mg 57.5mg 57.5mg 57.5 mg 57.5 mg 57.5 mg 60 mg   INR 2.4 2.33 3.83 2.40 2.60 1.96 2.30 2.21 2.03 2.49 1.65 2.10   Notes                 Date  4/5 4/13 4/18 4/25 5/3 5/10 5/18 6/2 6/8 6/24 7/8    Total WeeklyDose 67.5 mg 62.5 mg 60 mg 56.25 53.75 mg 55 mg 57.5 mg 57.5 mg 57.5 mg 57.5 mg 57.5 mg    INR 1.51 2.47 2.85 3.68 3.33 2.36 2.27 2.45 2.40 2.38 1.98     Notes proced holdx5  Medrol + keflex Medrol + keflex voltaren gel, norco redx2 rec 5/19  Mis-dose rec'd 6/27  allopurinol       * takes warfarin in AM    Phone Interview:  Tablet Strength: 7.5mg and 10mg tablet (12/29/21)  Patient Contact Info: [phone] (mobile); [phone] (home)  Verbal Release Auth: Charo Schneider (mother)  Lab Contact Info: EDUARDO Gonzalez [phone]; fax [phone]     Patient Findings  Negatives:  Signs/symptoms of thrombosis, Signs/symptoms of bleeding, Laboratory test error suspected, Change in health, Change in alcohol use, Change in activity, Upcoming invasive procedure, Emergency department visit, Upcoming dental procedure, Missed doses, Extra doses, Change in medications, Change in diet/appetite, Hospital admission, Bruising, Other complaints   Comments:  All findings negative per pt.      Plan:  1. INR was slightly SUBtherapeutic Friday, 7/8, at 1.98; didn't speak to pt until 7/11. Per Jordana Desir, PharmD, instructed Mr. Brennan to continue warfarin 7.5 mg oral daily except 10 mg on TuesSat until recheck.  2. Repeat INR in ~ two weeks, 7/22/22.  3. Verbal information provided over the phone. Tito Brennan RBV dosing instructions, expresses understanding by teach back, and has no further questions at this time.    Memo Caballero CPhT  7/11/2022  09:05 EDT     Warfarin refilled.  IEtelvina, PharmD, have reviewed the note in full and agree with the assessment and plan.  07/11/22  09:40 EDT

## 2022-07-11 DIAGNOSIS — I48.19 ATRIAL FIBRILLATION, PERSISTENT: ICD-10-CM

## 2022-07-11 RX ORDER — WARFARIN SODIUM 7.5 MG/1
TABLET ORAL
Qty: 90 TABLET | Refills: 1 | Status: SHIPPED | OUTPATIENT
Start: 2022-07-11 | End: 2022-10-26 | Stop reason: SDUPTHER

## 2022-07-11 RX ORDER — WARFARIN SODIUM 10 MG/1
TABLET ORAL
Qty: 65 TABLET | Refills: 0 | Status: SHIPPED | OUTPATIENT
Start: 2022-07-11 | End: 2022-10-26 | Stop reason: SDUPTHER

## 2022-07-22 ENCOUNTER — ANTICOAGULATION VISIT (OUTPATIENT)
Dept: PHARMACY | Facility: HOSPITAL | Age: 62
End: 2022-07-22

## 2022-07-22 LAB — INR PPP: 2.09

## 2022-07-22 NOTE — PROGRESS NOTES
Anticoagulation Clinic - Remote Progress Note  Remote Lab    Indication: paroxysmal afib   Referring Provider: Ursula  Initial Warfarin Start Date: 18-19 years ago (2001)  Goal INR: 2.0-3.0  Current Drug Interactions: pantaprozole (moderate); Fish oil and black cherry pills occassionally  CHADS-VASc: 3 (HF, HTN)    Diet: some GLV (green beans etc-no cooked greens) 6/27/22  Alcohol:  no  Tobacco: no  OTC Pain Medication: occationally tylenol for sinus HA    INR History:  Date 3/2/20 3/10 3/16 3/24 4/1 4/10 4/24 5/15 6/11 6/25 7/9 7/30 8/27   Total WeeklyDose 45 mg / 5 days 70mg 57.5mg 60mg 57.5mg 60mg 60mg 60mg 60mg 62.5mg 62.5mg 62.5mg 62.5mg   INR 1.84 4.02 3.0 3.28 2.38 2.71 2.26 2.03 1.96 2.06 2.46 2.20 2.38   Notes postop  1x hold misdose         recv'd 8/28     Date 9/24 10/22 10/29 12/1 12/2 12/7 12/14 1/5/21 2/4 3/7 4/15 4/27 5/12   Total WeeklyDose 62.5mg 62.5mg 62.5mg 62.5mg 62.5mg 55mg 62.5mg 62.5mg 62.5mg 62.5mg 62.5mg 60mg 62.5mg   INR 2.27 2.23 2.10 6.90 3.97 2.52 2.22 2.94 2.46 2.68 2.50 2.31 2.58   Notes  Bactrim; clinda  ??? Lab error?  1x miss recv'd 12/15  recv'd 2/8    1x miss; recv'd 5/13     Date 5/18 6/2 6/11 6/21 7/6 8/3 8/6 8/9 8/16 8/22 9/2 9/13 9/20   Total WeeklyDose 62.5mg 52.5mg 66.25  mg 62.5 mg 62.5 mg 58.75 mg 65mg 47.5mg 65mg 60mg 60mg 60mg 65mg   INR 2.06 2.01 3.26 2.25 2.79 1.22 6.07 1.23 3.39 2.36 2.08 1.63 1.9   Notes  1x miss 1xboost   3x hold; 1xboost 2xboost 2x hold  recv'd 8/23  3x hold; 1xboost      Date 9/27 10/12 10/25 11/2 11/11 11/22 12/7 12/28 1/25/22 2/8 2/23 3/3   Total WeeklyDose 65mg 62.5mg 62.5mg 55mg 57.5mg 57.5mg 57.5mg 57.5mg 57.5 mg 57.5 mg 57.5 mg 60 mg   INR 2.4 2.33 3.83 2.40 2.60 1.96 2.30 2.21 2.03 2.49 1.65 2.10   Notes                 Date  4/5 4/13 4/18 4/25 5/3 5/10 5/18 6/2 6/8 6/24 7/8 7/22   Total WeeklyDose 67.5 mg 62.5 mg 60 mg 56.25 53.75 mg 55 mg 57.5 mg 57.5 mg 57.5 mg 57.5 mg 57.5 mg 57.5 mg   INR 1.51 2.47 2.85 3.68 3.33 2.36 2.27 2.45  2.40 2.38 1.98 2.09   Notes proced holdx5  Medrol + keflex Medrol + keflex voltaren gel, norco redx2 rec 5/19  Mis-dose rec'd 6/27  allopurinol       * takes warfarin in AM    Phone Interview:  Tablet Strength: 7.5mg and 10mg tablet (12/29/21)  Patient Contact Info: [phone] (mobile); [phone] (home)  Verbal Release Auth: Charo Schneider (mother)  Lab Contact Info: EDUARDO Gonzalez [phone]; fax [phone]     Patient Findings  Negatives:  Signs/symptoms of thrombosis, Signs/symptoms of bleeding, Laboratory test error suspected, Change in health, Change in alcohol use, Change in activity, Upcoming invasive procedure, Emergency department visit, Upcoming dental procedure, Missed doses, Extra doses, Change in medications, Change in diet/appetite, Hospital admission, Bruising, Other complaints   Comments:  All findings negative per pt.      Plan:  1. INR was therapeutic today at 2.09. Instructed Mr. Brennan to continue warfarin 7.5 mg oral daily except 10 mg on TuesSat until recheck.  2. Repeat INR in ~ two weeks, 8/5/22.  3. Verbal information provided over the phone. Tito Brennan RBV dosing instructions, expresses understanding by teach back, and has no further questions at this time.    Memo Caballero C[facility]leen  7/22/22    Dayana HAM, PharmD, have reviewed the note in full and agree with the assessment and plan.  07/22/22  13:57 EDT

## 2022-08-12 ENCOUNTER — ANTICOAGULATION VISIT (OUTPATIENT)
Dept: PHARMACY | Facility: HOSPITAL | Age: 62
End: 2022-08-12

## 2022-08-12 LAB — INR PPP: 1.86

## 2022-08-12 NOTE — PROGRESS NOTES
Anticoagulation Clinic - Remote Progress Note  Remote Lab    Indication: paroxysmal afib   Referring Provider: Ursula  Initial Warfarin Start Date: 18-19 years ago (2001)  Goal INR: 2.0-3.0  Current Drug Interactions: pantaprozole (moderate); Fish oil and black cherry pills occassionally  CHADS-VASc: 3 (HF, HTN)    Diet: some GLV (green beans etc-no cooked greens) 6/27/22  Alcohol:  no  Tobacco: no  OTC Pain Medication: occationally tylenol for sinus HA    INR History:  Date 3/2/20 3/10 3/16 3/24 4/1 4/10 4/24 5/15 6/11 6/25 7/9 7/30 8/27   Total WeeklyDose 45 mg / 5 days 70mg 57.5mg 60mg 57.5mg 60mg 60mg 60mg 60mg 62.5mg 62.5mg 62.5mg 62.5mg   INR 1.84 4.02 3.0 3.28 2.38 2.71 2.26 2.03 1.96 2.06 2.46 2.20 2.38   Notes postop  1x hold misdose         recv'd 8/28     Date 9/24 10/22 10/29 12/1 12/2 12/7 12/14 1/5/21 2/4 3/7 4/15 4/27 5/12   Total WeeklyDose 62.5mg 62.5mg 62.5mg 62.5mg 62.5mg 55mg 62.5mg 62.5mg 62.5mg 62.5mg 62.5mg 60mg 62.5mg   INR 2.27 2.23 2.10 6.90 3.97 2.52 2.22 2.94 2.46 2.68 2.50 2.31 2.58   Notes  Bactrim; clinda  ??? Lab error?  1x miss recv'd 12/15  recv'd 2/8    1x miss; recv'd 5/13     Date 5/18 6/2 6/11 6/21 7/6 8/3 8/6 8/9 8/16 8/22 9/2 9/13 9/20   Total WeeklyDose 62.5mg 52.5mg 66.25  mg 62.5 mg 62.5 mg 58.75 mg 65mg 47.5mg 65mg 60mg 60mg 60mg 65mg   INR 2.06 2.01 3.26 2.25 2.79 1.22 6.07 1.23 3.39 2.36 2.08 1.63 1.9   Notes  1x miss 1xboost   3x hold; 1xboost 2xboost 2x hold  recv'd 8/23  3x hold; 1xboost      Date 9/27 10/12 10/25 11/2 11/11 11/22 12/7 12/28 1/25/22 2/8 2/23 3/3   Total WeeklyDose 65mg 62.5mg 62.5mg 55mg 57.5mg 57.5mg 57.5mg 57.5mg 57.5 mg 57.5 mg 57.5 mg 60 mg   INR 2.4 2.33 3.83 2.40 2.60 1.96 2.30 2.21 2.03 2.49 1.65 2.10   Notes                 Date  4/5 4/13 4/18 4/25 5/3 5/10 5/18 6/2 6/8 6/24 7/8 7/22   Total WeeklyDose 67.5 mg 62.5 mg 60 mg 56.25 53.75 mg 55 mg 57.5 mg 57.5 mg 57.5 mg 57.5 mg 57.5 mg 57.5 mg   INR 1.51 2.47 2.85 3.68 3.33 2.36 2.27 2.45  2.40 2.38 1.98 2.09   Notes proced holdx5  Medrol + keflex Medrol + keflex voltaren gel, norco redx2 rec 5/19  Mis-dose rec'd 6/27  allopurinol       Date 8./11              Total WeeklyDose 57.5 mg              INR 1.86              Notes               * takes warfarin in AM    Phone Interview:  Tablet Strength: 7.5mg and 10mg tablet (12/29/21)  Patient Contact Info: [phone] (mobile); [phone] (home)  Verbal Release Auth: Charo Schneider (mother)  Lab Contact Info: EDUARDO Gonzalez [phone]; fax [phone]     Patient Findings    Negatives:  Signs/symptoms of thrombosis, Signs/symptoms of bleeding, Laboratory test error suspected, Change in health, Change in alcohol use, Change in activity, Upcoming invasive procedure, Emergency department visit, Upcoming dental procedure, Missed doses, Extra doses, Change in medications, Change in diet/appetite, Hospital admission, Bruising, Other complaints   Comments:  All findings negative per pt.      Plan:  1. INR was SUBtherapeutic yesterday at 1.86. Per Andrew Karimi, PharmD, instructed Mr. Brennan to  boost Sundays warfarin dose to 10 mg then continue warfarin 7.5 mg oral daily except 10 mg on TuesSat until recheck. Pt takes warfarin in AM. Unable to boost to 10mg tonight.  2. Repeat INR in ~ one week, 8/18/22.  3. Verbal information provided over the phone. Tito Brennan RBV dosing instructions, expresses understanding by teach back, and has no further questions at this time.    Memo Caballero CPhT  8/12/2022  08:37 EDT     I, Leyla Saba, PharmD, have reviewed the note in full and agree with the assessment and plan.  08/12/22  12:23 EDT

## 2022-08-19 ENCOUNTER — ANTICOAGULATION VISIT (OUTPATIENT)
Dept: PHARMACY | Facility: HOSPITAL | Age: 62
End: 2022-08-19

## 2022-08-19 LAB — INR PPP: 1.83

## 2022-08-19 NOTE — PROGRESS NOTES
Anticoagulation Clinic - Remote Progress Note  Remote Lab    Indication: paroxysmal afib   Referring Provider: Ursula  Initial Warfarin Start Date: 18-19 years ago (2001)  Goal INR: 2.0-3.0  Current Drug Interactions: pantaprozole (moderate); Fish oil and black cherry pills occassionally  CHADS-VASc: 3 (HF, HTN)    Diet: some GLV (green beans etc-no cooked greens) 6/27/22  Alcohol:  no  Tobacco: no  OTC Pain Medication: occationally tylenol for sinus HA    INR History:  Date 3/2/20 3/10 3/16 3/24 4/1 4/10 4/24 5/15 6/11 6/25 7/9 7/30 8/27   Total WeeklyDose 45 mg / 5 days 70mg 57.5mg 60mg 57.5mg 60mg 60mg 60mg 60mg 62.5mg 62.5mg 62.5mg 62.5mg   INR 1.84 4.02 3.0 3.28 2.38 2.71 2.26 2.03 1.96 2.06 2.46 2.20 2.38   Notes postop  1x hold misdose         recv'd 8/28     Date 9/24 10/22 10/29 12/1 12/2 12/7 12/14 1/5/21 2/4 3/7 4/15 4/27 5/12   Total WeeklyDose 62.5mg 62.5mg 62.5mg 62.5mg 62.5mg 55mg 62.5mg 62.5mg 62.5mg 62.5mg 62.5mg 60mg 62.5mg   INR 2.27 2.23 2.10 6.90 3.97 2.52 2.22 2.94 2.46 2.68 2.50 2.31 2.58   Notes  Bactrim; clinda  ??? Lab error?  1x miss recv'd 12/15  recv'd 2/8    1x miss; recv'd 5/13     Date 5/18 6/2 6/11 6/21 7/6 8/3 8/6 8/9 8/16 8/22 9/2 9/13 9/20   Total WeeklyDose 62.5mg 52.5mg 66.25  mg 62.5 mg 62.5 mg 58.75 mg 65mg 47.5mg 65mg 60mg 60mg 60mg 65mg   INR 2.06 2.01 3.26 2.25 2.79 1.22 6.07 1.23 3.39 2.36 2.08 1.63 1.9   Notes  1x miss 1xboost   3x hold; 1xboost 2xboost 2x hold  recv'd 8/23  3x hold; 1xboost      Date 9/27 10/12 10/25 11/2 11/11 11/22 12/7 12/28 1/25/22 2/8 2/23 3/3   Total WeeklyDose 65mg 62.5mg 62.5mg 55mg 57.5mg 57.5mg 57.5mg 57.5mg 57.5 mg 57.5 mg 57.5 mg 60 mg   INR 2.4 2.33 3.83 2.40 2.60 1.96 2.30 2.21 2.03 2.49 1.65 2.10   Notes                 Date  4/5 4/13 4/18 4/25 5/3 5/10 5/18 6/2 6/8 6/24 7/8 7/22   Total WeeklyDose 67.5 mg 62.5 mg 60 mg 56.25 53.75 mg 55 mg 57.5 mg 57.5 mg 57.5 mg 57.5 mg 57.5 mg 57.5 mg   INR 1.51 2.47 2.85 3.68 3.33 2.36 2.27 2.45  2.40 2.38 1.98 2.09   Notes proced holdx5  Medrol + keflex Medrol + keflex voltaren gel, norco redx2 rec 5/19  Mis-dose rec'd 6/27  allopurinol       Date 8./11 8/19             Total WeeklyDose 57.5 mg 60 mg             INR 1.86 1.83             Notes               * takes warfarin in AM    Phone Interview:  Tablet Strength: 7.5mg and 10mg tablet (12/29/21)  Patient Contact Info: [phone] (mobile); [phone] (home)  Verbal Release Auth: Charo Schneider (mother)  Lab Contact Info: EDUARDO Gonzalez [phone]; fax [phone]     Patient Findings    Negatives:  Signs/symptoms of thrombosis, Signs/symptoms of bleeding, Laboratory test error suspected, Change in health, Change in alcohol use, Change in activity, Upcoming invasive procedure, Emergency department visit, Upcoming dental procedure, Missed doses, Extra doses, Change in medications, Change in diet/appetite, Hospital admission, Bruising, Other complaints   Comments:  All findings negative per pt.      Plan:  1. INR is again SUB therapeutic today at 1.83. Per [facility] Vel[facility], instructed Mr. Brennan to take warfarin 10 mg daily except warfarin 7.5 mg MonWedFri until recheck.     until recheck. Pt takes warfarin in AM. Unable to boost to 10mg tonight.  2. Repeat INR in one week, 8/26/22.  3. Verbal information provided over the phone. Titolisette Brennan RBV dosing instructions, expresses understanding by teach back, and has no further questions at this time.    Memo Caballero CPhT  8/19/2022  13:55 EDT     Patient will have received 62.5mg at next check    I, Ira Veliz [facility], have reviewed the note in full and agree with the assessment and plan.  08/19/22  14:41 EDT

## 2022-08-29 ENCOUNTER — ANTICOAGULATION VISIT (OUTPATIENT)
Dept: PHARMACY | Facility: HOSPITAL | Age: 62
End: 2022-08-29

## 2022-08-29 LAB — INR PPP: 2.15

## 2022-08-29 NOTE — PROGRESS NOTES
Anticoagulation Clinic - Remote Progress Note  Remote Lab    Indication: paroxysmal afib   Referring Provider: Ursula  Initial Warfarin Start Date: 18-19 years ago (2001)  Goal INR: 2.0-3.0  Current Drug Interactions: pantaprozole (moderate); Fish oil and black cherry pills occassionally  CHADS-VASc: 3 (HF, HTN)    Diet: some GLV (green beans etc-no cooked greens) 6/27/22  Alcohol:  no  Tobacco: no  OTC Pain Medication: occationally tylenol for sinus HA    INR History:  Date 3/2/20 3/10 3/16 3/24 4/1 4/10 4/24 5/15 6/11 6/25 7/9 7/30 8/27   Total WeeklyDose 45 mg / 5 days 70mg 57.5mg 60mg 57.5mg 60mg 60mg 60mg 60mg 62.5mg 62.5mg 62.5mg 62.5mg   INR 1.84 4.02 3.0 3.28 2.38 2.71 2.26 2.03 1.96 2.06 2.46 2.20 2.38   Notes postop  1x hold misdose         recv'd 8/28     Date 9/24 10/22 10/29 12/1 12/2 12/7 12/14 1/5/21 2/4 3/7 4/15 4/27 5/12   Total WeeklyDose 62.5mg 62.5mg 62.5mg 62.5mg 62.5mg 55mg 62.5mg 62.5mg 62.5mg 62.5mg 62.5mg 60mg 62.5mg   INR 2.27 2.23 2.10 6.90 3.97 2.52 2.22 2.94 2.46 2.68 2.50 2.31 2.58   Notes  Bactrim; clinda  ??? Lab error?  1x miss recv'd 12/15  recv'd 2/8    1x miss; recv'd 5/13     Date 5/18 6/2 6/11 6/21 7/6 8/3 8/6 8/9 8/16 8/22 9/2 9/13 9/20   Total WeeklyDose 62.5mg 52.5mg 66.25  mg 62.5 mg 62.5 mg 58.75 mg 65mg 47.5mg 65mg 60mg 60mg 60mg 65mg   INR 2.06 2.01 3.26 2.25 2.79 1.22 6.07 1.23 3.39 2.36 2.08 1.63 1.9   Notes  1x miss 1xboost   3x hold; 1xboost 2xboost 2x hold  recv'd 8/23  3x hold; 1xboost      Date 9/27 10/12 10/25 11/2 11/11 11/22 12/7 12/28 1/25/22 2/8 2/23 3/3   Total WeeklyDose 65mg 62.5mg 62.5mg 55mg 57.5mg 57.5mg 57.5mg 57.5mg 57.5 mg 57.5 mg 57.5 mg 60 mg   INR 2.4 2.33 3.83 2.40 2.60 1.96 2.30 2.21 2.03 2.49 1.65 2.10   Notes                 Date  4/5 4/13 4/18 4/25 5/3 5/10 5/18 6/2 6/8 6/24 7/8 7/22   Total WeeklyDose 67.5 mg 62.5 mg 60 mg 56.25 53.75 mg 55 mg 57.5 mg 57.5 mg 57.5 mg 57.5 mg 57.5 mg 57.5 mg   INR 1.51 2.47 2.85 3.68 3.33 2.36 2.27 2.45  2.40 2.38 1.98 2.09   Notes proced holdx5  Medrol + keflex Medrol + keflex voltaren gel, norco redx2 rec 5/19  Mis-dose rec'd 6/27  allopurinol       Date 8./11 8/19 8/29            Total WeeklyDose 57.5 mg 60 mg 62.5 mg            INR 1.86 1.83 2.15            Notes               * takes warfarin in AM    Phone Interview:  Tablet Strength: 7.5mg and 10mg tablet (12/29/21)  Patient Contact Info: [phone] (mobile); [phone] (home)  Verbal Release Auth: Charo Schneider (mother)  Lab Contact Info: EDUARDO Gonzalez [phone]; fax [phone]     Patient Findings    Negatives:  Signs/symptoms of thrombosis, Signs/symptoms of bleeding, Laboratory test error suspected, Change in health, Change in alcohol use, Change in activity, Upcoming invasive procedure, Emergency department visit, Upcoming dental procedure, Missed doses, Extra doses, Change in medications, Change in diet/appetite, Hospital admission, Bruising, Other complaints   Comments:  All findings negative per pt.      Plan:  1. INR is therapeutic today at 2.15. Instructed Mr. Brennan to continue warfarin 10 mg daily except warfarin 7.5 mg MonWedFri until recheck. Pt takes warfarin in AM.   2. Repeat INR in one week, 9/6/22, d/t holiday.  3. Verbal information provided over the phone. Tito Brennan RBV dosing instructions, expresses understanding by teach back, and has no further questions at this time.    Memo Caballero CPhT  8/29/2022  14:34 EDT     Can extend interval at recheck if [facility]  I, Jordana Desir, PharmD, have reviewed the note in full and agree with the assessment and plan.  08/30/22  09:10 EDT

## 2022-09-02 ENCOUNTER — ANTICOAGULATION VISIT (OUTPATIENT)
Dept: PHARMACY | Facility: HOSPITAL | Age: 62
End: 2022-09-02

## 2022-09-02 LAB — INR PPP: 2.29

## 2022-09-02 NOTE — PROGRESS NOTES
Anticoagulation Clinic - Remote Progress Note  Remote Lab    Indication: paroxysmal afib   Referring Provider: Ursula  Initial Warfarin Start Date: 18-19 years ago (2001)  Goal INR: 2.0-3.0  Current Drug Interactions: pantaprozole (moderate); Fish oil and black cherry pills occassionally  CHADS-VASc: 3 (HF, HTN)    Diet: some GLV (green beans etc-no cooked greens) 6/27/22  Alcohol:  no  Tobacco: no  OTC Pain Medication: occationally tylenol for sinus HA    INR History:  Date 3/2/20 3/10 3/16 3/24 4/1 4/10 4/24 5/15 6/11 6/25 7/9 7/30 8/27   Total WeeklyDose 45 mg / 5 days 70mg 57.5mg 60mg 57.5mg 60mg 60mg 60mg 60mg 62.5mg 62.5mg 62.5mg 62.5mg   INR 1.84 4.02 3.0 3.28 2.38 2.71 2.26 2.03 1.96 2.06 2.46 2.20 2.38   Notes postop  1x hold misdose         recv'd 8/28     Date 9/24 10/22 10/29 12/1 12/2 12/7 12/14 1/5/21 2/4 3/7 4/15 4/27 5/12   Total WeeklyDose 62.5mg 62.5mg 62.5mg 62.5mg 62.5mg 55mg 62.5mg 62.5mg 62.5mg 62.5mg 62.5mg 60mg 62.5mg   INR 2.27 2.23 2.10 6.90 3.97 2.52 2.22 2.94 2.46 2.68 2.50 2.31 2.58   Notes  Bactrim; clinda  ??? Lab error?  1x miss recv'd 12/15  recv'd 2/8    1x miss; recv'd 5/13     Date 5/18 6/2 6/11 6/21 7/6 8/3 8/6 8/9 8/16 8/22 9/2 9/13 9/20   Total WeeklyDose 62.5mg 52.5mg 66.25  mg 62.5 mg 62.5 mg 58.75 mg 65mg 47.5mg 65mg 60mg 60mg 60mg 65mg   INR 2.06 2.01 3.26 2.25 2.79 1.22 6.07 1.23 3.39 2.36 2.08 1.63 1.9   Notes  1x miss 1xboost   3x hold; 1xboost 2xboost 2x hold  recv'd 8/23  3x hold; 1xboost      Date 9/27 10/12 10/25 11/2 11/11 11/22 12/7 12/28 1/25/22 2/8 2/23 3/3   Total WeeklyDose 65mg 62.5mg 62.5mg 55mg 57.5mg 57.5mg 57.5mg 57.5mg 57.5 mg 57.5 mg 57.5 mg 60 mg   INR 2.4 2.33 3.83 2.40 2.60 1.96 2.30 2.21 2.03 2.49 1.65 2.10   Notes                 Date  4/5 4/13 4/18 4/25 5/3 5/10 5/18 6/2 6/8 6/24 7/8 7/22   Total WeeklyDose 67.5 mg 62.5 mg 60 mg 56.25 53.75 mg 55 mg 57.5 mg 57.5 mg 57.5 mg 57.5 mg 57.5 mg 57.5 mg   INR 1.51 2.47 2.85 3.68 3.33 2.36 2.27 2.45  2.40 2.38 1.98 2.09   Notes proced holdx5  Medrol + keflex Medrol + keflex voltaren gel, norco redx2 rec 5/19  Mis-dose rec'd 6/27  allopurinol       Date 8./11 8/19 8/29 9/2           Total WeeklyDose 57.5 mg 60 mg 62.5 mg 62.5 mg           INR 1.86 1.83 2.15 2.29           Notes               * takes warfarin in AM    Phone Interview:  Tablet Strength: 7.5mg and 10mg tablet (12/29/21)  Patient Contact Info: [phone] (mobile); [phone] (home)  Verbal Release Auth: Charo Schneider (mother)  Lab Contact Info: EDUARDO Gonzalez [phone]; fax [phone]     Patient Findings  Negatives:  Signs/symptoms of thrombosis, Signs/symptoms of bleeding, Laboratory test error suspected, Change in health, Change in alcohol use, Change in activity, Upcoming invasive procedure, Emergency department visit, Upcoming dental procedure, Missed doses, Extra doses, Change in medications, Change in diet/appetite, Hospital admission, Bruising, Other complaints   Comments:  All findings negative per pt.      Plan:  1. INR is therapeutic today at 2.29. Instructed Mr. Brennan to continue warfarin 10 mg daily except warfarin 7.5 mg MonWedFri until recheck. Pt takes warfarin in AM.   2. Repeat INR in two weeks, 9/16/22.  3. Verbal information provided over the phone. Titolisette Guychaitanya RBV dosing instructions, expresses understanding by teach back, and has no further questions at this time.    Memo Caballero CPhT  9/2/2022  11:33 EDT     I, Leyla Saba, PharmD, have reviewed the note in full and agree with the assessment and plan.  09/02/22  14:18 EDT

## 2022-09-16 ENCOUNTER — ANTICOAGULATION VISIT (OUTPATIENT)
Dept: PHARMACY | Facility: HOSPITAL | Age: 62
End: 2022-09-16

## 2022-09-16 LAB — INR PPP: 2.69

## 2022-09-16 NOTE — PROGRESS NOTES
Anticoagulation Clinic - Remote Progress Note  Remote Lab    Indication: paroxysmal afib   Referring Provider: Ursula  Initial Warfarin Start Date: 18-19 years ago (2001)  Goal INR: 2.0-3.0  Current Drug Interactions: pantaprozole (moderate); Fish oil and black cherry pills occassionally  CHADS-VASc: 3 (HF, HTN)    Diet: some GLV (green beans etc-no cooked greens) 6/27/22  Alcohol:  no  Tobacco: no  OTC Pain Medication: occationally tylenol for sinus HA    INR History:  Date 3/2/20 3/10 3/16 3/24 4/1 4/10 4/24 5/15 6/11 6/25 7/9 7/30 8/27   Total WeeklyDose 45 mg / 5 days 70mg 57.5mg 60mg 57.5mg 60mg 60mg 60mg 60mg 62.5mg 62.5mg 62.5mg 62.5mg   INR 1.84 4.02 3.0 3.28 2.38 2.71 2.26 2.03 1.96 2.06 2.46 2.20 2.38   Notes postop  1x hold misdose         recv'd 8/28     Date 9/24 10/22 10/29 12/1 12/2 12/7 12/14 1/5/21 2/4 3/7 4/15 4/27 5/12   Total WeeklyDose 62.5mg 62.5mg 62.5mg 62.5mg 62.5mg 55mg 62.5mg 62.5mg 62.5mg 62.5mg 62.5mg 60mg 62.5mg   INR 2.27 2.23 2.10 6.90 3.97 2.52 2.22 2.94 2.46 2.68 2.50 2.31 2.58   Notes  Bactrim; clinda  ??? Lab error?  1x miss recv'd 12/15  recv'd 2/8    1x miss; recv'd 5/13     Date 5/18 6/2 6/11 6/21 7/6 8/3 8/6 8/9 8/16 8/22 9/2 9/13 9/20   Total WeeklyDose 62.5mg 52.5mg 66.25  mg 62.5 mg 62.5 mg 58.75 mg 65mg 47.5mg 65mg 60mg 60mg 60mg 65mg   INR 2.06 2.01 3.26 2.25 2.79 1.22 6.07 1.23 3.39 2.36 2.08 1.63 1.9   Notes  1x miss 1xboost   3x hold; 1xboost 2xboost 2x hold  recv'd 8/23  3x hold; 1xboost      Date 9/27 10/12 10/25 11/2 11/11 11/22 12/7 12/28 1/25/22 2/8 2/23 3/3   Total WeeklyDose 65mg 62.5mg 62.5mg 55mg 57.5mg 57.5mg 57.5mg 57.5mg 57.5 mg 57.5 mg 57.5 mg 60 mg   INR 2.4 2.33 3.83 2.40 2.60 1.96 2.30 2.21 2.03 2.49 1.65 2.10   Notes                 Date  4/5 4/13 4/18 4/25 5/3 5/10 5/18 6/2 6/8 6/24 7/8 7/22   Total WeeklyDose 67.5 mg 62.5 mg 60 mg 56.25 53.75 mg 55 mg 57.5 mg 57.5 mg 57.5 mg 57.5 mg 57.5 mg 57.5 mg   INR 1.51 2.47 2.85 3.68 3.33 2.36 2.27 2.45  2.40 2.38 1.98 2.09   Notes proced holdx5  Medrol + keflex Medrol + keflex voltaren gel, norco redx2 rec 5/19  Mis-dose rec'd 6/27  allopurinol       Date 8./11 8/19 8/29 9/2 9/16          Total WeeklyDose 57.5 mg 60 mg 62.5 mg 62.5 mg 62.5 mg          INR 1.86 1.83 2.15 2.29 2.69          Notes               * takes warfarin in AM    Phone Interview:  Tablet Strength: 7.5mg and 10mg tablet (12/29/21)  Patient Contact Info: [phone] (mobile); [phone] (home)  Verbal Release Auth: Charo Schneider (mother)  Lab Contact Info: EDUARDO Gonzalez [phone]; fax [phone]     Patient Findings  Negatives:  Signs/symptoms of thrombosis, Signs/symptoms of bleeding, Laboratory test error suspected, Change in health, Change in alcohol use, Change in activity, Upcoming invasive procedure, Emergency department visit, Upcoming dental procedure, Missed doses, Extra doses, Change in medications, Change in diet/appetite, Hospital admission, Bruising, Other complaints   Comments:  All findings negative per pt.      Plan:  1. INR is therapeutic today at 2.69. Instructed Mr. Brennan to continue warfarin 10 mg daily except warfarin 7.5 mg MonWedFri until recheck. Pt takes warfarin in AM.   2. Repeat INR in two weeks, 9/30/22.  3. Verbal information provided over the phone. Titolisette Brennan RBV dosing instructions, expresses understanding by teach back, and has no further questions at this time.    Memo Caballero CPhT  9/16/2022  11:18 EDT     I, Dayana Tong, PharmD, have reviewed the note in full and agree with the assessment and plan.  09/16/22  12:25 EDT

## 2022-09-29 ENCOUNTER — TELEPHONE (OUTPATIENT)
Dept: PHARMACY | Facility: HOSPITAL | Age: 62
End: 2022-09-29

## 2022-09-30 ENCOUNTER — ANTICOAGULATION VISIT (OUTPATIENT)
Dept: PHARMACY | Facility: HOSPITAL | Age: 62
End: 2022-09-30

## 2022-09-30 LAB — INR PPP: 2.53

## 2022-09-30 NOTE — PROGRESS NOTES
Anticoagulation Clinic - Remote Progress Note  Remote Lab    Indication: paroxysmal afib   Referring Provider: Ursula  Initial Warfarin Start Date: 18-19 years ago (2001)  Goal INR: 2.0-3.0  Current Drug Interactions: pantaprozole (moderate); Fish oil and black cherry pills occassionally  CHADS-VASc: 3 (HF, HTN)    Diet: some GLV (green beans etc-no cooked greens) 6/27/22  Alcohol:  no  Tobacco: no  OTC Pain Medication: occationally tylenol for sinus HA    INR History:  Date 3/2/20 3/10 3/16 3/24 4/1 4/10 4/24 5/15 6/11 6/25 7/9 7/30 8/27   Total WeeklyDose 45 mg / 5 days 70mg 57.5mg 60mg 57.5mg 60mg 60mg 60mg 60mg 62.5mg 62.5mg 62.5mg 62.5mg   INR 1.84 4.02 3.0 3.28 2.38 2.71 2.26 2.03 1.96 2.06 2.46 2.20 2.38   Notes postop  1x hold misdose         recv'd 8/28     Date 9/24 10/22 10/29 12/1 12/2 12/7 12/14 1/5/21 2/4 3/7 4/15 4/27 5/12   Total WeeklyDose 62.5mg 62.5mg 62.5mg 62.5mg 62.5mg 55mg 62.5mg 62.5mg 62.5mg 62.5mg 62.5mg 60mg 62.5mg   INR 2.27 2.23 2.10 6.90 3.97 2.52 2.22 2.94 2.46 2.68 2.50 2.31 2.58   Notes  Bactrim; clinda  ??? Lab error?  1x miss recv'd 12/15  recv'd 2/8    1x miss; recv'd 5/13     Date 5/18 6/2 6/11 6/21 7/6 8/3 8/6 8/9 8/16 8/22 9/2 9/13 9/20   Total WeeklyDose 62.5mg 52.5mg 66.25  mg 62.5 mg 62.5 mg 58.75 mg 65mg 47.5mg 65mg 60mg 60mg 60mg 65mg   INR 2.06 2.01 3.26 2.25 2.79 1.22 6.07 1.23 3.39 2.36 2.08 1.63 1.9   Notes  1x miss 1xboost   3x hold; 1xboost 2xboost 2x hold  recv'd 8/23  3x hold; 1xboost      Date 9/27 10/12 10/25 11/2 11/11 11/22 12/7 12/28 1/25/22 2/8 2/23 3/3   Total WeeklyDose 65mg 62.5mg 62.5mg 55mg 57.5mg 57.5mg 57.5mg 57.5mg 57.5 mg 57.5 mg 57.5 mg 60 mg   INR 2.4 2.33 3.83 2.40 2.60 1.96 2.30 2.21 2.03 2.49 1.65 2.10   Notes                 Date  4/5 4/13 4/18 4/25 5/3 5/10 5/18 6/2 6/8 6/24 7/8 7/22   Total WeeklyDose 67.5 mg 62.5 mg 60 mg 56.25 53.75 mg 55 mg 57.5 mg 57.5 mg 57.5 mg 57.5 mg 57.5 mg 57.5 mg   INR 1.51 2.47 2.85 3.68 3.33 2.36 2.27 2.45  2.40 2.38 1.98 2.09   Notes proced holdx5  Medrol + keflex Medrol + keflex voltaren gel, norco redx2 rec 5/19  Mis-dose rec'd 6/27  allopurinol       Date 8./11 8/19 8/29 9/2 9/16 9/30         Total WeeklyDose 57.5 mg 60 mg 62.5 mg 62.5 mg 62.5 mg 62.5 mg         INR 1.86 1.83 2.15 2.29 2.69 2.53         Notes               * takes warfarin in AM    Phone Interview:  Tablet Strength: 7.5mg and 10mg tablet (12/29/21)  Patient Contact Info: [phone] (mobile); [phone] (home)  Verbal Release Auth: Charo Schneider (mother)  Lab Contact Info: EDUARDO Gonzalez [phone]; fax [phone]     Patient Findings    Positives:  Change in medications   Negatives:  Signs/symptoms of thrombosis, Signs/symptoms of bleeding, Laboratory test error suspected, Change in health, Change in alcohol use, Change in activity, Upcoming invasive procedure, Emergency department visit, Upcoming dental procedure, Missed doses, Extra doses, Change in diet/appetite, Hospital admission, Bruising, Other complaints   Comments:  Keflex 500 mg qid x 7 days; started 9/27. All other findings negative.      Plan:  1. INR is therapeutic today at 2.53. Instructed Mr. Brennan to continue warfarin 10 mg daily except warfarin 7.5 mg MonWedFri until recheck. Pt takes warfarin in AM.   2. Repeat INR on 10/3/22 d/t possible DDI.  3. Verbal information provided over the phone. Titolisette Brennan RBV dosing instructions, expresses understanding by teach back, and has no further questions at this time.    Memo Caballero CPhT  9/30/2022  11:15 EDT     I, Dayana Tong, PharmD, have reviewed the note in full and agree with the assessment and plan.  09/30/22  12:11 EDT

## 2022-10-03 ENCOUNTER — ANTICOAGULATION VISIT (OUTPATIENT)
Dept: PHARMACY | Facility: HOSPITAL | Age: 62
End: 2022-10-03

## 2022-10-03 LAB — INR PPP: 2.3

## 2022-10-03 NOTE — PROGRESS NOTES
Anticoagulation Clinic - Remote Progress Note  Remote Lab    Indication: paroxysmal afib   Referring Provider: Ursula  Initial Warfarin Start Date: 18-19 years ago (2001)  Goal INR: 2.0-3.0  Current Drug Interactions: pantaprozole (moderate); Fish oil and black cherry pills occassionally  CHADS-VASc: 3 (HF, HTN)    Diet: some GLV (green beans etc-no cooked greens) 6/27/22  Alcohol:  no  Tobacco: no  OTC Pain Medication: occationally tylenol for sinus HA    INR History:  Date 3/2/20 3/10 3/16 3/24 4/1 4/10 4/24 5/15 6/11 6/25 7/9 7/30 8/27   Total WeeklyDose 45 mg / 5 days 70mg 57.5mg 60mg 57.5mg 60mg 60mg 60mg 60mg 62.5mg 62.5mg 62.5mg 62.5mg   INR 1.84 4.02 3.0 3.28 2.38 2.71 2.26 2.03 1.96 2.06 2.46 2.20 2.38   Notes postop  1x hold misdose         recv'd 8/28     Date 9/24 10/22 10/29 12/1 12/2 12/7 12/14 1/5/21 2/4 3/7 4/15 4/27 5/12   Total WeeklyDose 62.5mg 62.5mg 62.5mg 62.5mg 62.5mg 55mg 62.5mg 62.5mg 62.5mg 62.5mg 62.5mg 60mg 62.5mg   INR 2.27 2.23 2.10 6.90 3.97 2.52 2.22 2.94 2.46 2.68 2.50 2.31 2.58   Notes  Bactrim; clinda  ??? Lab error?  1x miss recv'd 12/15  recv'd 2/8    1x miss; recv'd 5/13     Date 5/18 6/2 6/11 6/21 7/6 8/3 8/6 8/9 8/16 8/22 9/2 9/13 9/20   Total WeeklyDose 62.5mg 52.5mg 66.25  mg 62.5 mg 62.5 mg 58.75 mg 65mg 47.5mg 65mg 60mg 60mg 60mg 65mg   INR 2.06 2.01 3.26 2.25 2.79 1.22 6.07 1.23 3.39 2.36 2.08 1.63 1.9   Notes  1x miss 1xboost   3x hold; 1xboost 2xboost 2x hold  recv'd 8/23  3x hold; 1xboost      Date 9/27 10/12 10/25 11/2 11/11 11/22 12/7 12/28 1/25/22 2/8 2/23 3/3   Total WeeklyDose 65mg 62.5mg 62.5mg 55mg 57.5mg 57.5mg 57.5mg 57.5mg 57.5 mg 57.5 mg 57.5 mg 60 mg   INR 2.4 2.33 3.83 2.40 2.60 1.96 2.30 2.21 2.03 2.49 1.65 2.10   Notes                 Date  4/5 4/13 4/18 4/25 5/3 5/10 5/18 6/2 6/8 6/24 7/8 7/22   Total WeeklyDose 67.5 mg 62.5 mg 60 mg 56.25 53.75 mg 55 mg 57.5 mg 57.5 mg 57.5 mg 57.5 mg 57.5 mg 57.5 mg   INR 1.51 2.47 2.85 3.68 3.33 2.36 2.27 2.45  2.40 2.38 1.98 2.09   Notes proced holdx5  Medrol + keflex Medrol + keflex voltaren gel, norco redx2 rec 5/19  Mis-dose rec'd 6/27  allopurinol       Date 8./11 8/19 8/29 9/2 9/16 9/30 10/3        Total WeeklyDose 57.5 mg 60 mg 62.5 mg 62.5 mg 62.5 mg 62.5 mg 62.5 mg        INR 1.86 1.83 2.15 2.29 2.69 2.53 2.3        Notes       keflex        * takes warfarin in AM    Phone Interview:  Tablet Strength: 7.5mg and 10mg tablet (12/29/21)  Patient Contact Info: [phone] (mobile, preferred); [phone] (home)  Verbal Release Auth: Charo Schneider (mother)  Lab Contact Info: EDUARDO Gonzalez [phone]; fax [phone]     Patient Findings  Positives:  Change in medications   Negatives:  Signs/symptoms of thrombosis, Signs/symptoms of bleeding, Laboratory test error suspected, Change in health, Change in alcohol use, Change in activity, Upcoming invasive procedure, Emergency department visit, Upcoming dental procedure, Missed doses, Extra doses, Change in diet/appetite, Hospital admission, Bruising, Other complaints   Comments:  He took his Keflex from 9/27 thru this morning.  He had an elbow infection which has resolved.   He has been using a nose spray for allergies.   Otherwise, above finding negative     Plan:    1. INR is therapeutic today at 2.53. Instructed Mr. Brennan to continue warfarin 10 mg oral daily except warfarin 7.5 mg MonWedFri until recheck. He takes warfarin in AM.   2. Repeat INR on 10/17/22   3. Verbal information provided over the phone. Tito Brennan RBV dosing instructions, expresses understanding by teach back, and has no further questions at this time.    Bing Flynn, PharmD  10/3/2022  14:06 EDT

## 2022-10-17 ENCOUNTER — ANTICOAGULATION VISIT (OUTPATIENT)
Dept: PHARMACY | Facility: HOSPITAL | Age: 62
End: 2022-10-17

## 2022-10-17 LAB — INR PPP: 2.19

## 2022-10-17 NOTE — PROGRESS NOTES
Anticoagulation Clinic - Remote Progress Note  Remote Lab    Indication: paroxysmal afib   Referring Provider: Ursula  Initial Warfarin Start Date: 18-19 years ago (2001)  Goal INR: 2.0-3.0  Current Drug Interactions: pantaprozole (moderate); Fish oil and black cherry pills occassionally  CHADS-VASc: 3 (HF, HTN)    Diet: some GLV (green beans etc-no cooked greens) 6/27/22  Alcohol:  no  Tobacco: no  OTC Pain Medication: occationally tylenol for sinus HA    INR History:  Date 3/2/20 3/10 3/16 3/24 4/1 4/10 4/24 5/15 6/11 6/25 7/9 7/30 8/27   Total WeeklyDose 45 mg / 5 days 70mg 57.5mg 60mg 57.5mg 60mg 60mg 60mg 60mg 62.5mg 62.5mg 62.5mg 62.5mg   INR 1.84 4.02 3.0 3.28 2.38 2.71 2.26 2.03 1.96 2.06 2.46 2.20 2.38   Notes postop  1x hold misdose         recv'd 8/28     Date 9/24 10/22 10/29 12/1 12/2 12/7 12/14 1/5/21 2/4 3/7 4/15 4/27 5/12   Total WeeklyDose 62.5mg 62.5mg 62.5mg 62.5mg 62.5mg 55mg 62.5mg 62.5mg 62.5mg 62.5mg 62.5mg 60mg 62.5mg   INR 2.27 2.23 2.10 6.90 3.97 2.52 2.22 2.94 2.46 2.68 2.50 2.31 2.58   Notes  Bactrim; clinda  ??? Lab error?  1x miss recv'd 12/15  recv'd 2/8    1x miss; recv'd 5/13     Date 5/18 6/2 6/11 6/21 7/6 8/3 8/6 8/9 8/16 8/22 9/2 9/13 9/20   Total WeeklyDose 62.5mg 52.5mg 66.25  mg 62.5 mg 62.5 mg 58.75 mg 65mg 47.5mg 65mg 60mg 60mg 60mg 65mg   INR 2.06 2.01 3.26 2.25 2.79 1.22 6.07 1.23 3.39 2.36 2.08 1.63 1.9   Notes  1x miss 1xboost   3x hold; 1xboost 2xboost 2x hold  recv'd 8/23  3x hold; 1xboost      Date 9/27 10/12 10/25 11/2 11/11 11/22 12/7 12/28 1/25/22 2/8 2/23 3/3   Total WeeklyDose 65mg 62.5mg 62.5mg 55mg 57.5mg 57.5mg 57.5mg 57.5mg 57.5 mg 57.5 mg 57.5 mg 60 mg   INR 2.4 2.33 3.83 2.40 2.60 1.96 2.30 2.21 2.03 2.49 1.65 2.10   Notes                 Date  4/5 4/13 4/18 4/25 5/3 5/10 5/18 6/2 6/8 6/24 7/8 7/22   Total WeeklyDose 67.5 mg 62.5 mg 60 mg 56.25 53.75 mg 55 mg 57.5 mg 57.5 mg 57.5 mg 57.5 mg 57.5 mg 57.5 mg   INR 1.51 2.47 2.85 3.68 3.33 2.36 2.27 2.45  2.40 2.38 1.98 2.09   Notes proced holdx5  Medrol + keflex Medrol + keflex voltaren gel, norco redx2 rec 5/19  Mis-dose rec'd 6/27  allopurinol       Date 8./11 8/19 8/29 9/2 9/16 9/30 10/3 10/17       Total WeeklyDose 57.5 mg 60 mg 62.5 mg 62.5 mg 62.5 mg 62.5 mg 62.5 mg 62.5 mg       INR 1.86 1.83 2.15 2.29 2.69 2.53 2.3 2.19       Notes       keflex        * takes warfarin in AM    Phone Interview:  Tablet Strength: 7.5mg and 10mg tablet (12/29/21)  Patient Contact Info: [phone] (mobile, preferred); [phone] (home)  Verbal Release Auth: Charo Schneider (mother)  Lab Contact Info: EDUARDO Gonzalez [phone]; fax [phone]     Patient Findings    Negatives:  Signs/symptoms of thrombosis, Signs/symptoms of bleeding, Laboratory test error suspected, Change in health, Change in alcohol use, Change in activity, Upcoming invasive procedure, Emergency department visit, Upcoming dental procedure, Missed doses, Extra doses, Change in medications, Change in diet/appetite, Hospital admission, Bruising, Other complaints   Comments:  All findings negative per pt.      Plan:    1. INR is therapeutic today at 2.19. Instructed Mr. Brennan to continue warfarin 10 mg oral daily except warfarin 7.5 mg MonWedFri until recheck. He takes warfarin in AM.   2. Repeat INR in two weeks on 10/31/22   3. Verbal information provided over the phone. Tito Brennan RBV dosing instructions, expresses understanding by teach back, and has no further questions at this time.    Memo Caballero CPhT  10/17/2022  11:00 EDT     I, Dayana Tong, PharmD, have reviewed the note in full and agree with the assessment and plan.  10/17/22  11:44 EDT

## 2022-10-26 DIAGNOSIS — I48.19 ATRIAL FIBRILLATION, PERSISTENT: ICD-10-CM

## 2022-10-26 RX ORDER — SACUBITRIL AND VALSARTAN 49; 51 MG/1; MG/1
TABLET, FILM COATED ORAL
Qty: 60 TABLET | Refills: 5 | Status: SHIPPED | OUTPATIENT
Start: 2022-10-26

## 2022-10-26 RX ORDER — WARFARIN SODIUM 10 MG/1
TABLET ORAL
Qty: 65 TABLET | Refills: 0 | OUTPATIENT
Start: 2022-10-26

## 2022-10-26 RX ORDER — METOPROLOL SUCCINATE 100 MG/1
TABLET, EXTENDED RELEASE ORAL
Qty: 60 TABLET | Refills: 5 | Status: SHIPPED | OUTPATIENT
Start: 2022-10-26 | End: 2023-02-13 | Stop reason: SDUPTHER

## 2022-10-27 RX ORDER — WARFARIN SODIUM 10 MG/1
TABLET ORAL
Qty: 65 TABLET | Refills: 1 | Status: SHIPPED | OUTPATIENT
Start: 2022-10-27

## 2022-10-27 RX ORDER — WARFARIN SODIUM 7.5 MG/1
TABLET ORAL
Qty: 90 TABLET | Refills: 1 | Status: SHIPPED | OUTPATIENT
Start: 2022-10-27

## 2022-11-01 ENCOUNTER — ANTICOAGULATION VISIT (OUTPATIENT)
Dept: PHARMACY | Facility: HOSPITAL | Age: 62
End: 2022-11-01

## 2022-11-01 LAB — INR PPP: 2.19

## 2022-11-01 NOTE — PROGRESS NOTES
Anticoagulation Clinic - Remote Progress Note  Remote Lab    Indication: paroxysmal afib   Referring Provider: Ursula  Initial Warfarin Start Date: 18-19 years ago (2001)  Goal INR: 2.0-3.0  Current Drug Interactions: pantaprozole (moderate); Fish oil and black cherry pills occassionally  CHADS-VASc: 3 (HF, HTN)    Diet: some GLV (green beans etc-no cooked greens) 6/27/22  Alcohol:  no  Tobacco: no  OTC Pain Medication: occationally tylenol for sinus HA    INR History:  Date 3/2/20 3/10 3/16 3/24 4/1 4/10 4/24 5/15 6/11 6/25 7/9 7/30 8/27   Total WeeklyDose 45 mg / 5 days 70mg 57.5mg 60mg 57.5mg 60mg 60mg 60mg 60mg 62.5mg 62.5mg 62.5mg 62.5mg   INR 1.84 4.02 3.0 3.28 2.38 2.71 2.26 2.03 1.96 2.06 2.46 2.20 2.38   Notes postop  1x hold misdose         recv'd 8/28     Date 9/24 10/22 10/29 12/1 12/2 12/7 12/14 1/5/21 2/4 3/7 4/15 4/27 5/12   Total WeeklyDose 62.5mg 62.5mg 62.5mg 62.5mg 62.5mg 55mg 62.5mg 62.5mg 62.5mg 62.5mg 62.5mg 60mg 62.5mg   INR 2.27 2.23 2.10 6.90 3.97 2.52 2.22 2.94 2.46 2.68 2.50 2.31 2.58   Notes  Bactrim; clinda  ??? Lab error?  1x miss recv'd 12/15  recv'd 2/8    1x miss; recv'd 5/13     Date 5/18 6/2 6/11 6/21 7/6 8/3 8/6 8/9 8/16 8/22 9/2 9/13 9/20   Total WeeklyDose 62.5mg 52.5mg 66.25  mg 62.5 mg 62.5 mg 58.75 mg 65mg 47.5mg 65mg 60mg 60mg 60mg 65mg   INR 2.06 2.01 3.26 2.25 2.79 1.22 6.07 1.23 3.39 2.36 2.08 1.63 1.9   Notes  1x miss 1xboost   3x hold; 1xboost 2xboost 2x hold  recv'd 8/23  3x hold; 1xboost      Date 9/27 10/12 10/25 11/2 11/11 11/22 12/7 12/28 1/25/22 2/8 2/23 3/3   Total WeeklyDose 65mg 62.5mg 62.5mg 55mg 57.5mg 57.5mg 57.5mg 57.5mg 57.5 mg 57.5 mg 57.5 mg 60 mg   INR 2.4 2.33 3.83 2.40 2.60 1.96 2.30 2.21 2.03 2.49 1.65 2.10   Notes                 Date  4/5 4/13 4/18 4/25 5/3 5/10 5/18 6/2 6/8 6/24 7/8 7/22   Total WeeklyDose 67.5 mg 62.5 mg 60 mg 56.25 53.75 mg 55 mg 57.5 mg 57.5 mg 57.5 mg 57.5 mg 57.5 mg 57.5 mg   INR 1.51 2.47 2.85 3.68 3.33 2.36 2.27 2.45  2.40 2.38 1.98 2.09   Notes proced holdx5  Medrol + keflex Medrol + keflex voltaren gel, norco redx2 rec 5/19  Mis-dose rec'd 6/27  allopurinol       Date 8./11 8/19 8/29 9/2 9/16 9/30 10/3 10/17 11/1      Total WeeklyDose 57.5 mg 60 mg 62.5 mg 62.5 mg 62.5 mg 62.5 mg 62.5 mg 62.5 mg 62.5 mg      INR 1.86 1.83 2.15 2.29 2.69 2.53 2.3 2.19 2.19      Notes       keflex        * takes warfarin in AM    Phone Interview:  Tablet Strength: 7.5mg and 10mg tablet (12/29/21)  Patient Contact Info: [phone] (mobile, preferred); [phone] (home)  Verbal Release Auth: Charo Schneider (mother)  Lab Contact Info: EDUARDO Gonzalez [phone]; fax [phone]     Patient Findings    Negatives:  Signs/symptoms of thrombosis, Signs/symptoms of bleeding, Laboratory test error suspected, Change in health, Change in alcohol use, Change in activity, Upcoming invasive procedure, Emergency department visit, Upcoming dental procedure, Missed doses, Extra doses, Change in medications, Change in diet/appetite, Hospital admission, Bruising, Other complaints   Comments:  All findings negative per pt.      Plan:  1. INR is therapeutic today at 2.19. Instructed Mr. Brennan to continue warfarin 10 mg oral daily except warfarin 7.5 mg MonWedFri until recheck. He takes warfarin in AM.   2. Repeat INR in two weeks on 11/15/22   3. Verbal information provided over the phone. Tito Brennan RBV dosing instructions, expresses understanding by teach back, and has no further questions at this time.    Memo Caballero CPhT  11/1/2022  11:17 EDT     I, Dayana Tong, PharmD, have reviewed the note in full and agree with the assessment and plan.  11/01/22  13:51 EDT

## 2022-11-15 ENCOUNTER — ANTICOAGULATION VISIT (OUTPATIENT)
Dept: PHARMACY | Facility: HOSPITAL | Age: 62
End: 2022-11-15

## 2022-11-15 LAB — INR PPP: 2.39

## 2022-11-15 NOTE — PROGRESS NOTES
Anticoagulation Clinic - Remote Progress Note  Remote Lab    Indication: paroxysmal afib   Referring Provider: Ursula  Initial Warfarin Start Date: 18-19 years ago (2001)  Goal INR: 2.0-3.0  Current Drug Interactions: pantaprozole (moderate); Fish oil and black cherry pills occassionally  CHADS-VASc: 3 (HF, HTN)    Diet: some GLV (green beans etc-no cooked greens) 6/27/22  Alcohol:  no  Tobacco: no  OTC Pain Medication: occationally tylenol for sinus HA    INR History:  Date 3/2/20 3/10 3/16 3/24 4/1 4/10 4/24 5/15 6/11 6/25 7/9 7/30 8/27   Total WeeklyDose 45 mg / 5 days 70mg 57.5mg 60mg 57.5mg 60mg 60mg 60mg 60mg 62.5mg 62.5mg 62.5mg 62.5mg   INR 1.84 4.02 3.0 3.28 2.38 2.71 2.26 2.03 1.96 2.06 2.46 2.20 2.38   Notes postop  1x hold misdose         recv'd 8/28     Date 9/24 10/22 10/29 12/1 12/2 12/7 12/14 1/5/21 2/4 3/7 4/15 4/27 5/12   Total WeeklyDose 62.5mg 62.5mg 62.5mg 62.5mg 62.5mg 55mg 62.5mg 62.5mg 62.5mg 62.5mg 62.5mg 60mg 62.5mg   INR 2.27 2.23 2.10 6.90 3.97 2.52 2.22 2.94 2.46 2.68 2.50 2.31 2.58   Notes  Bactrim; clinda  ??? Lab error?  1x miss recv'd 12/15  recv'd 2/8    1x miss; recv'd 5/13     Date 5/18 6/2 6/11 6/21 7/6 8/3 8/6 8/9 8/16 8/22 9/2 9/13 9/20   Total WeeklyDose 62.5mg 52.5mg 66.25  mg 62.5 mg 62.5 mg 58.75 mg 65mg 47.5mg 65mg 60mg 60mg 60mg 65mg   INR 2.06 2.01 3.26 2.25 2.79 1.22 6.07 1.23 3.39 2.36 2.08 1.63 1.9   Notes  1x miss 1xboost   3x hold; 1xboost 2xboost 2x hold  recv'd 8/23  3x hold; 1xboost      Date 9/27 10/12 10/25 11/2 11/11 11/22 12/7 12/28 1/25/22 2/8 2/23 3/3   Total WeeklyDose 65mg 62.5mg 62.5mg 55mg 57.5mg 57.5mg 57.5mg 57.5mg 57.5 mg 57.5 mg 57.5 mg 60 mg   INR 2.4 2.33 3.83 2.40 2.60 1.96 2.30 2.21 2.03 2.49 1.65 2.10   Notes                 Date  4/5 4/13 4/18 4/25 5/3 5/10 5/18 6/2 6/8 6/24 7/8 7/22   Total WeeklyDose 67.5 mg 62.5 mg 60 mg 56.25 53.75 mg 55 mg 57.5 mg 57.5 mg 57.5 mg 57.5 mg 57.5 mg 57.5 mg   INR 1.51 2.47 2.85 3.68 3.33 2.36 2.27 2.45  2.40 2.38 1.98 2.09   Notes proced holdx5  Medrol + keflex Medrol + keflex voltaren gel, norco redx2 rec 5/19  Mis-dose rec'd 6/27  allopurinol       Date 8./11 8/19 8/29 9/2 9/16 9/30 10/3 10/17 11/1 11/15     Total WeeklyDose 57.5 mg 60 mg 62.5 mg 62.5 mg 62.5 mg 62.5 mg 62.5 mg 62.5 mg 62.5 mg 62.5 mg     INR 1.86 1.83 2.15 2.29 2.69 2.53 2.3 2.19 2.19 2.39     Notes       keflex        * takes warfarin in AM    Phone Interview:  Tablet Strength: 7.5mg and 10mg tablet (12/29/21)  Patient Contact Info: [phone] (mobile, preferred); [phone] (home)  Verbal Release Auth: Charo Schneider (mother)  Lab Contact Info: EDUARDO Gonzalez [phone]; fax [phone]     Patient Findings    Negatives:  Signs/symptoms of thrombosis, Signs/symptoms of bleeding, Laboratory test error suspected, Change in health, Change in alcohol use, Change in activity, Upcoming invasive procedure, Emergency department visit, Upcoming dental procedure, Missed doses, Extra doses, Change in medications, Change in diet/appetite, Hospital admission, Bruising, Other complaints   Comments:  All findings negative per pt.      Plan:  1. INR is therapeutic today at 2.39. Instructed Mr. Brennan to continue warfarin 10 mg oral daily except warfarin 7.5 mg MonWedFri until recheck. He takes warfarin in AM.   2. Repeat INR in two weeks on 11/29/22   3. Verbal information provided over the phone. Tito Brennan RBV dosing instructions, expresses understanding by teach back, and has no further questions at this time.    Memo Caballero CPhT  11/15/2022  16:10 EST     I, Andrew Karimi, PharmD, have reviewed the note in full and agree with the assessment and plan.  11/15/22  16:35 EST

## 2022-12-05 ENCOUNTER — ANTICOAGULATION VISIT (OUTPATIENT)
Dept: PHARMACY | Facility: HOSPITAL | Age: 62
End: 2022-12-05

## 2022-12-05 LAB — INR PPP: 2.5

## 2022-12-05 NOTE — PROGRESS NOTES
Anticoagulation Clinic - Remote Progress Note  Remote Lab    Indication: paroxysmal afib   Referring Provider: Ursula  Initial Warfarin Start Date: 18-19 years ago (2001)  Goal INR: 2.0-3.0  Current Drug Interactions: pantaprozole (moderate); Fish oil and black cherry pills occassionally  CHADS-VASc: 3 (HF, HTN)    Diet: some GLV (green beans etc-no cooked greens) 6/27/22  Alcohol:  no  Tobacco: no  OTC Pain Medication: occationally tylenol for sinus HA    INR History:  Date 3/2/20 3/10 3/16 3/24 4/1 4/10 4/24 5/15 6/11 6/25 7/9 7/30 8/27   Total WeeklyDose 45 mg / 5 days 70mg 57.5mg 60mg 57.5mg 60mg 60mg 60mg 60mg 62.5mg 62.5mg 62.5mg 62.5mg   INR 1.84 4.02 3.0 3.28 2.38 2.71 2.26 2.03 1.96 2.06 2.46 2.20 2.38   Notes postop  1x hold misdose         recv'd 8/28     Date 9/24 10/22 10/29 12/1 12/2 12/7 12/14 1/5/21 2/4 3/7 4/15 4/27 5/12   Total WeeklyDose 62.5mg 62.5mg 62.5mg 62.5mg 62.5mg 55mg 62.5mg 62.5mg 62.5mg 62.5mg 62.5mg 60mg 62.5mg   INR 2.27 2.23 2.10 6.90 3.97 2.52 2.22 2.94 2.46 2.68 2.50 2.31 2.58   Notes  Bactrim; clinda  ??? Lab error?  1x miss recv'd 12/15  recv'd 2/8    1x miss; recv'd 5/13     Date 5/18 6/2 6/11 6/21 7/6 8/3 8/6 8/9 8/16 8/22 9/2 9/13 9/20   Total WeeklyDose 62.5mg 52.5mg 66.25  mg 62.5 mg 62.5 mg 58.75 mg 65mg 47.5mg 65mg 60mg 60mg 60mg 65mg   INR 2.06 2.01 3.26 2.25 2.79 1.22 6.07 1.23 3.39 2.36 2.08 1.63 1.9   Notes  1x miss 1xboost   3x hold; 1xboost 2xboost 2x hold  recv'd 8/23  3x hold; 1xboost      Date 9/27 10/12 10/25 11/2 11/11 11/22 12/7 12/28 1/25/22 2/8 2/23 3/3   Total WeeklyDose 65mg 62.5mg 62.5mg 55mg 57.5mg 57.5mg 57.5mg 57.5mg 57.5 mg 57.5 mg 57.5 mg 60 mg   INR 2.4 2.33 3.83 2.40 2.60 1.96 2.30 2.21 2.03 2.49 1.65 2.10   Notes                 Date  4/5 4/13 4/18 4/25 5/3 5/10 5/18 6/2 6/8 6/24 7/8 7/22   Total WeeklyDose 67.5 mg 62.5 mg 60 mg 56.25 53.75 mg 55 mg 57.5 mg 57.5 mg 57.5 mg 57.5 mg 57.5 mg 57.5 mg   INR 1.51 2.47 2.85 3.68 3.33 2.36 2.27 2.45  2.40 2.38 1.98 2.09   Notes proced holdx5  Medrol + keflex Medrol + keflex voltaren gel, norco redx2 rec 5/19  Mis-dose rec'd 6/27  allopurinol       Date 8./11 8/19 8/29 9/2 9/16 9/30 10/3 10/17 11/1 11/15 12/5    Total WeeklyDose 57.5 mg 60 mg 62.5 mg 62.5 mg 62.5 mg 62.5 mg 62.5 mg 62.5 mg 62.5 mg 62.5 mg 62.5    INR 1.86 1.83 2.15 2.29 2.69 2.53 2.3 2.19 2.19 2.39 2.3    Notes       keflex        * takes warfarin in AM    Phone Interview:  Tablet Strength: 7.5mg and 10mg tablet (12/29/21)  Patient Contact Info: [phone] (mobile, preferred); [phone] (home)  Verbal Release Auth: Charo Schneider (mother)  Lab Contact Info: EDUARDO Gonzalez [phone]; fax [phone]     Patient Findings  Negatives:  Signs/symptoms of thrombosis, Signs/symptoms of bleeding, Laboratory test error suspected, Change in health, Change in alcohol use, Change in activity, Upcoming invasive procedure, Emergency department visit, Upcoming dental procedure, Missed doses, Extra doses, Change in medications, Change in diet/appetite, Hospital admission, Bruising, Other complaints   Comments:  All findings negative per pt.      Plan:  1. INR is therapeutic today at 2.3. Instructed Mr. Brennan to continue warfarin 10 mg oral daily except warfarin 7.5 mg MonWedFri until recheck. He takes warfarin in AM.   2. Repeat INR in four weeks on 1/3/23   3. Verbal information provided over the phone. Tito Bernnan RBV dosing instructions, expresses understanding by teach back, and has no further questions at this time.    Sondra Mai, Pharmacy Intern  12/5/2022  14:51 Leyla ROWAN, PharmD, have reviewed the note in full and agree with the assessment and plan.  12/07/22  12:09 EST

## 2023-01-04 ENCOUNTER — ANTICOAGULATION VISIT (OUTPATIENT)
Dept: PHARMACY | Facility: HOSPITAL | Age: 63
End: 2023-01-04
Payer: COMMERCIAL

## 2023-01-04 LAB — INR PPP: 2.07

## 2023-01-04 NOTE — PROGRESS NOTES
Anticoagulation Clinic - Remote Progress Note  Remote Lab    Indication: paroxysmal afib   Referring Provider: Ursula  Initial Warfarin Start Date: 18-19 years ago (2001)  Goal INR: 2.0-3.0  Current Drug Interactions: pantaprozole (moderate); Fish oil and black cherry pills occassionally  CHADS-VASc: 3 (HF, HTN)    Diet: some GLV (green beans etc-no cooked greens) 6/27/22  Alcohol:  no  Tobacco: no  OTC Pain Medication: occationally tylenol for sinus HA    INR History:  Date 3/2/20 3/10 3/16 3/24 4/1 4/10 4/24 5/15 6/11 6/25 7/9 7/30 8/27   Total WeeklyDose 45 mg / 5 days 70mg 57.5mg 60mg 57.5mg 60mg 60mg 60mg 60mg 62.5mg 62.5mg 62.5mg 62.5mg   INR 1.84 4.02 3.0 3.28 2.38 2.71 2.26 2.03 1.96 2.06 2.46 2.20 2.38   Notes postop  1x hold misdose         recv'd 8/28     Date 9/24 10/22 10/29 12/1 12/2 12/7 12/14 1/5/21 2/4 3/7 4/15 4/27 5/12   Total WeeklyDose 62.5mg 62.5mg 62.5mg 62.5mg 62.5mg 55mg 62.5mg 62.5mg 62.5mg 62.5mg 62.5mg 60mg 62.5mg   INR 2.27 2.23 2.10 6.90 3.97 2.52 2.22 2.94 2.46 2.68 2.50 2.31 2.58   Notes  Bactrim; clinda  ??? Lab error?  1x miss recv'd 12/15  recv'd 2/8    1x miss; recv'd 5/13     Date 5/18 6/2 6/11 6/21 7/6 8/3 8/6 8/9 8/16 8/22 9/2 9/13 9/20   Total WeeklyDose 62.5mg 52.5mg 66.25  mg 62.5 mg 62.5 mg 58.75 mg 65mg 47.5mg 65mg 60mg 60mg 60mg 65mg   INR 2.06 2.01 3.26 2.25 2.79 1.22 6.07 1.23 3.39 2.36 2.08 1.63 1.9   Notes  1x miss 1xboost   3x hold; 1xboost 2xboost 2x hold  recv'd 8/23  3x hold; 1xboost      Date 9/27 10/12 10/25 11/2 11/11 11/22 12/7 12/28 1/25/22 2/8 2/23 3/3   Total WeeklyDose 65mg 62.5mg 62.5mg 55mg 57.5mg 57.5mg 57.5mg 57.5mg 57.5 mg 57.5 mg 57.5 mg 60 mg   INR 2.4 2.33 3.83 2.40 2.60 1.96 2.30 2.21 2.03 2.49 1.65 2.10   Notes                 Date  4/5 4/13 4/18 4/25 5/3 5/10 5/18 6/2 6/8 6/24 7/8 7/22   Total WeeklyDose 67.5 mg 62.5 mg 60 mg 56.25 53.75 mg 55 mg 57.5 mg 57.5 mg 57.5 mg 57.5 mg 57.5 mg 57.5 mg   INR 1.51 2.47 2.85 3.68 3.33 2.36 2.27 2.45  2.40 2.38 1.98 2.09   Notes proced holdx5  Medrol + keflex Medrol + keflex voltaren gel, norco redx2 rec 5/19  Mis-dose rec'd 6/27  allopurinol       Date 8./11 8/19 8/29 9/2 9/16 9/30 10/3 10/17 11/1 11/15 12/5 1/3/23   Total WeeklyDose 57.5 mg 60 mg 62.5 mg 62.5 mg 62.5 mg 62.5 mg 62.5 mg 62.5 mg 62.5 mg 62.5 mg 62.5 mg 62.5 mg   INR 1.86 1.83 2.15 2.29 2.69 2.53 2.3 2.19 2.19 2.39 2.3 2.07   Notes       keflex        * takes warfarin in AM    Phone Interview:  Tablet Strength: 7.5mg and 10mg tablet (12/29/21)  Patient Contact Info: [phone] (mobile, preferred); [phone] (home)  Verbal Release Auth: Charo Schneider (mother)  Lab Contact Info: EDUARDO Gonzalez [phone]; fax [phone]     Patient Findings  Negatives:  Signs/symptoms of thrombosis, Signs/symptoms of bleeding, Laboratory test error suspected, Change in health, Change in alcohol use, Change in activity, Upcoming invasive procedure, Emergency department visit, Upcoming dental procedure, Missed doses, Extra doses, Change in medications, Change in diet/appetite, Hospital admission, Bruising, Other complaints   Comments:  All findings negative per pt.      Plan:  1. INR was therapeutic yesterday at 2.07. Instructed Mr. Brennan to continue warfarin 10 mg oral daily except warfarin 7.5 mg MonWedFri until recheck. He takes warfarin in AM.   2. Repeat INR in four weeks on 1/31/23.   3. Verbal information provided over the phone. Tito Brennan RBV dosing instructions, expresses understanding by teach back, and has no further questions at this time.    Memo Caballero, Tommy  1/4/2023  11:27 EST       Leyla HAM, PharmD, have reviewed the note in full and agree with the assessment and plan.  01/04/23  11:51 EST

## 2023-01-27 ENCOUNTER — TELEPHONE (OUTPATIENT)
Dept: CARDIOLOGY | Facility: CLINIC | Age: 63
End: 2023-01-27
Payer: COMMERCIAL

## 2023-01-27 NOTE — TELEPHONE ENCOUNTER
Patient is scheduled for a palmar fascietomy. [facility] would like warfarin instruction for this surgery.              [facility]  (P)[phone]  (F)[phone]

## 2023-01-31 ENCOUNTER — ANTICOAGULATION VISIT (OUTPATIENT)
Dept: PHARMACY | Facility: HOSPITAL | Age: 63
End: 2023-01-31
Payer: COMMERCIAL

## 2023-01-31 ENCOUNTER — TELEPHONE (OUTPATIENT)
Dept: PHARMACY | Facility: HOSPITAL | Age: 63
End: 2023-01-31
Payer: COMMERCIAL

## 2023-01-31 LAB — INR PPP: 2.47

## 2023-01-31 NOTE — PROGRESS NOTES
Anticoagulation Clinic - Remote Progress Note  Remote Lab    Indication: paroxysmal afib   Referring Provider: Ursula  Initial Warfarin Start Date: 18-19 years ago (2001)  Goal INR: 2.0-3.0  Current Drug Interactions: pantaprozole (moderate); Fish oil and black cherry pills occassionally  CHADS-VASc: 3 (HF, HTN)    Diet: some GLV (green beans etc-no cooked greens) 6/27/22  Alcohol:  no  Tobacco: no  OTC Pain Medication: occationally tylenol for sinus HA    INR History:  Date 3/2/20 3/10 3/16 3/24 4/1 4/10 4/24 5/15 6/11 6/25 7/9 7/30 8/27   Total WeeklyDose 45 mg / 5 days 70mg 57.5mg 60mg 57.5mg 60mg 60mg 60mg 60mg 62.5mg 62.5mg 62.5mg 62.5mg   INR 1.84 4.02 3.0 3.28 2.38 2.71 2.26 2.03 1.96 2.06 2.46 2.20 2.38   Notes postop  1x hold misdose         recv'd 8/28     Date 9/24 10/22 10/29 12/1 12/2 12/7 12/14 1/5/21 2/4 3/7 4/15 4/27 5/12   Total WeeklyDose 62.5mg 62.5mg 62.5mg 62.5mg 62.5mg 55mg 62.5mg 62.5mg 62.5mg 62.5mg 62.5mg 60mg 62.5mg   INR 2.27 2.23 2.10 6.90 3.97 2.52 2.22 2.94 2.46 2.68 2.50 2.31 2.58   Notes  Bactrim; clinda  ??? Lab error?  1x miss recv'd 12/15  recv'd 2/8    1x miss; recv'd 5/13     Date 5/18 6/2 6/11 6/21 7/6 8/3 8/6 8/9 8/16 8/22 9/2 9/13 9/20   Total WeeklyDose 62.5mg 52.5mg 66.25  mg 62.5 mg 62.5 mg 58.75 mg 65mg 47.5mg 65mg 60mg 60mg 60mg 65mg   INR 2.06 2.01 3.26 2.25 2.79 1.22 6.07 1.23 3.39 2.36 2.08 1.63 1.9   Notes  1x miss 1xboost   3x hold; 1xboost 2xboost 2x hold  recv'd 8/23  3x hold; 1xboost      Date 9/27 10/12 10/25 11/2 11/11 11/22 12/7 12/28 1/25/22 2/8 2/23 3/3   Total WeeklyDose 65mg 62.5mg 62.5mg 55mg 57.5mg 57.5mg 57.5mg 57.5mg 57.5 mg 57.5 mg 57.5 mg 60 mg   INR 2.4 2.33 3.83 2.40 2.60 1.96 2.30 2.21 2.03 2.49 1.65 2.10   Notes                 Date  4/5 4/13 4/18 4/25 5/3 5/10 5/18 6/2 6/8 6/24 7/8 7/22   Total WeeklyDose 67.5 mg 62.5 mg 60 mg 56.25 53.75 mg 55 mg 57.5 mg 57.5 mg 57.5 mg 57.5 mg 57.5 mg 57.5 mg   INR 1.51 2.47 2.85 3.68 3.33 2.36 2.27 2.45  2.40 2.38 1.98 2.09   Notes proced holdx5  Medrol + keflex Medrol + keflex voltaren gel, norco redx2 rec 5/19  Mis-dose rec'd 6/27  allopurinol       Date 8./11 8/19 8/29 9/2 9/16 9/30 10/3 10/17 11/1 11/15 12/5 1/3/23   Total WeeklyDose 57.5 mg 60 mg 62.5 mg 62.5 mg 62.5 mg 62.5 mg 62.5 mg 62.5 mg 62.5 mg 62.5 mg 62.5 mg 62.5 mg   INR 1.86 1.83 2.15 2.29 2.69 2.53 2.3 2.19 2.19 2.39 2.3 2.07   Notes       keflex          Date 1/31              Total WeeklyDose 62.5 mg              INR 2.47              Notes                 * takes warfarin in AM    Phone Interview:  Tablet Strength: 7.5mg and 10mg tablet (12/29/21)  Patient Contact Info: [phone] (mobile, preferred); [phone] (home)  Verbal Release Auth: Charo Schneider (mother)  Lab Contact Info: EDUARDO Gonzalez [phone]; fax [phone]     Patient Findings  Positives:  Upcoming invasive procedure   Negatives:  Signs/symptoms of thrombosis, Signs/symptoms of bleeding, Laboratory test error suspected, Change in health, Change in alcohol use, Change in activity, Emergency department visit, Upcoming dental procedure, Missed doses, Extra doses, Change in medications, Change in diet/appetite, Hospital admission, Bruising, Other complaints   Comments:  2/22 hand procedure, [employer] with Dr. Esposito. All other findings negative.      Plan:  1. INR is therapeutic today at 2.47. Instructed Mr. Brennan to continue warfarin 10 mg oral daily except warfarin 7.5 mg MonWedFri until recheck. He takes warfarin in AM.   2. Repeat INR in four weeks on 2/28/23. Will probably change when we get more info surrounding upcoming procedure, Mr Brenann aware.  3. Verbal information provided over the phone. Tito Brennan RBV dosing instructions, expresses understanding by teach back, and has no further questions at this time.    Memo Caballero CPhT  1/31/2023  12:24 EST     andree is being worked on for upcoming procedure.  I, Leyla Saba, PharmD, have reviewed the note in  full and agree with the assessment and plan.  01/31/23  13:21 EST

## 2023-02-13 RX ORDER — METOPROLOL SUCCINATE 100 MG/1
100 TABLET, EXTENDED RELEASE ORAL 2 TIMES DAILY
Qty: 60 TABLET | Refills: 4 | Status: SHIPPED | OUTPATIENT
Start: 2023-02-13

## 2023-03-01 ENCOUNTER — ANTICOAGULATION VISIT (OUTPATIENT)
Dept: PHARMACY | Facility: HOSPITAL | Age: 63
End: 2023-03-01
Payer: COMMERCIAL

## 2023-03-01 LAB — INR PPP: 1.85

## 2023-03-01 NOTE — PROGRESS NOTES
Anticoagulation Clinic - Remote Progress Note  Remote Lab    Indication: paroxysmal afib   Referring Provider: Ursula  Initial Warfarin Start Date: 18-19 years ago (2001)  Goal INR: 2.0-3.0  Current Drug Interactions: pantaprozole (moderate); Fish oil and black cherry pills occassionally  CHADS-VASc: 3 (HF, HTN)    Diet: some GLV (green beans etc-no cooked greens) 6/27/22  Alcohol:  no  Tobacco: no  OTC Pain Medication: occationally tylenol for sinus HA    INR History:  Date 3/2/20 3/10 3/16 3/24 4/1 4/10 4/24 5/15 6/11 6/25 7/9 7/30 8/27   Total WeeklyDose 45 mg / 5 days 70mg 57.5mg 60mg 57.5mg 60mg 60mg 60mg 60mg 62.5mg 62.5mg 62.5mg 62.5mg   INR 1.84 4.02 3.0 3.28 2.38 2.71 2.26 2.03 1.96 2.06 2.46 2.20 2.38   Notes postop  1x hold misdose         recv'd 8/28     Date 9/24 10/22 10/29 12/1 12/2 12/7 12/14 1/5/21 2/4 3/7 4/15 4/27 5/12   Total WeeklyDose 62.5mg 62.5mg 62.5mg 62.5mg 62.5mg 55mg 62.5mg 62.5mg 62.5mg 62.5mg 62.5mg 60mg 62.5mg   INR 2.27 2.23 2.10 6.90 3.97 2.52 2.22 2.94 2.46 2.68 2.50 2.31 2.58   Notes  Bactrim; clinda  ??? Lab error?  1x miss recv'd 12/15  recv'd 2/8    1x miss; recv'd 5/13     Date 5/18 6/2 6/11 6/21 7/6 8/3 8/6 8/9 8/16 8/22 9/2 9/13 9/20   Total WeeklyDose 62.5mg 52.5mg 66.25  mg 62.5 mg 62.5 mg 58.75 mg 65mg 47.5mg 65mg 60mg 60mg 60mg 65mg   INR 2.06 2.01 3.26 2.25 2.79 1.22 6.07 1.23 3.39 2.36 2.08 1.63 1.9   Notes  1x miss 1xboost   3x hold; 1xboost 2xboost 2x hold  recv'd 8/23  3x hold; 1xboost      Date 9/27 10/12 10/25 11/2 11/11 11/22 12/7 12/28 1/25/22 2/8 2/23 3/3   Total WeeklyDose 65mg 62.5mg 62.5mg 55mg 57.5mg 57.5mg 57.5mg 57.5mg 57.5 mg 57.5 mg 57.5 mg 60 mg   INR 2.4 2.33 3.83 2.40 2.60 1.96 2.30 2.21 2.03 2.49 1.65 2.10   Notes                 Date  4/5 4/13 4/18 4/25 5/3 5/10 5/18 6/2 6/8 6/24 7/8 7/22   Total WeeklyDose 67.5 mg 62.5 mg 60 mg 56.25 53.75 mg 55 mg 57.5 mg 57.5 mg 57.5 mg 57.5 mg 57.5 mg 57.5 mg   INR 1.51 2.47 2.85 3.68 3.33 2.36 2.27 2.45  2.40 2.38 1.98 2.09   Notes proced holdx5  Medrol + keflex Medrol + keflex voltaren gel, norco redx2 rec 5/19  Mis-dose rec'd 6/27  allopurinol       Date 8./11 8/19 8/29 9/2 9/16 9/30 10/3 10/17 11/1 11/15 12/5 1/3/23   Total WeeklyDose 57.5 mg 60 mg 62.5 mg 62.5 mg 62.5 mg 62.5 mg 62.5 mg 62.5 mg 62.5 mg 62.5 mg 62.5 mg 62.5 mg   INR 1.86 1.83 2.15 2.29 2.69 2.53 2.3 2.19 2.19 2.39 2.3 2.07   Notes       keflex          Date 1/31  3/1            Total WeeklyDose 62.5 mg 4 day hold 61.25mg            INR 2.47  1.85            Notes               * takes warfarin in AM    Phone Interview:  Tablet Strength: 7.5mg and 10mg tablet (12/29/21)  Patient Contact Info: [phone] (mobile, preferred); [phone] (home)  Verbal Release Auth: Charo Schneider (mother)  Lab Contact Info: EDUARDO Gonzalez [phone]; fax [phone]     Patient Findings  Negatives:  Signs/symptoms of thrombosis, Signs/symptoms of bleeding, Laboratory test error suspected, Change in health, Change in alcohol use, Change in activity, Upcoming invasive procedure, Emergency department visit, Upcoming dental procedure, Missed doses, Extra doses, Change in medications, Change in diet/appetite, Hospital admission, Bruising, Other complaints     Plan:  1. INR is SUBtherapeutic today at 1.85 following hold for procedure. Instructed Mr. Brennan to continue warfarin 10 mg oral daily except warfarin 7.5 mg MonWedFri. He takes warfarin in AM.   2. Repeat INR on 3/6/2023. Will probably change when we get more info surrounding upcoming procedure, Mr Brennan aware.  3. Patient is considering starting superbeet supplement. Appears only beets are the ingredients. He will let us know  If this is something he plans to start and determine if it affects his INR.  4. Verbal information provided over the phone. Tito Brennan RBV dosing instructions, expresses understanding by teach back, and has no further questions at this time.    Leyla Saba, PharmD  3/1/2023  10:55  EST

## 2023-03-01 NOTE — TELEPHONE ENCOUNTER
EDUARDO new lab contact information:   [phone] extension: [phone]; [phone]; [phone]    Verbal received PT: 18 INR: 1.85

## 2023-03-07 ENCOUNTER — ANTICOAGULATION VISIT (OUTPATIENT)
Dept: PHARMACY | Facility: HOSPITAL | Age: 63
End: 2023-03-07
Payer: COMMERCIAL

## 2023-03-07 LAB — INR PPP: 2.53

## 2023-03-07 NOTE — PROGRESS NOTES
Anticoagulation Clinic - Remote Progress Note  Remote Lab    Indication: paroxysmal afib   Referring Provider: Ursula  Initial Warfarin Start Date: 18-19 years ago (2001)  Goal INR: 2.0-3.0  Current Drug Interactions: pantaprozole (moderate); Fish oil and black cherry pills occassionally  CHADS-VASc: 3 (HF, HTN)    Diet: some GLV (green beans etc-no cooked greens) 6/27/22  Alcohol:  no  Tobacco: no  OTC Pain Medication: occationally tylenol for sinus HA    INR History:  Date 3/2/20 3/10 3/16 3/24 4/1 4/10 4/24 5/15 6/11 6/25 7/9 7/30 8/27   Total WeeklyDose 45 mg / 5 days 70mg 57.5mg 60mg 57.5mg 60mg 60mg 60mg 60mg 62.5mg 62.5mg 62.5mg 62.5mg   INR 1.84 4.02 3.0 3.28 2.38 2.71 2.26 2.03 1.96 2.06 2.46 2.20 2.38   Notes postop  1x hold misdose         recv'd 8/28     Date 9/24 10/22 10/29 12/1 12/2 12/7 12/14 1/5/21 2/4 3/7 4/15 4/27 5/12   Total WeeklyDose 62.5mg 62.5mg 62.5mg 62.5mg 62.5mg 55mg 62.5mg 62.5mg 62.5mg 62.5mg 62.5mg 60mg 62.5mg   INR 2.27 2.23 2.10 6.90 3.97 2.52 2.22 2.94 2.46 2.68 2.50 2.31 2.58   Notes  Bactrim; clinda  ??? Lab error?  1x miss recv'd 12/15  recv'd 2/8    1x miss; recv'd 5/13     Date 5/18 6/2 6/11 6/21 7/6 8/3 8/6 8/9 8/16 8/22 9/2 9/13 9/20   Total WeeklyDose 62.5mg 52.5mg 66.25  mg 62.5 mg 62.5 mg 58.75 mg 65mg 47.5mg 65mg 60mg 60mg 60mg 65mg   INR 2.06 2.01 3.26 2.25 2.79 1.22 6.07 1.23 3.39 2.36 2.08 1.63 1.9   Notes  1x miss 1xboost   3x hold; 1xboost 2xboost 2x hold  recv'd 8/23  3x hold; 1xboost      Date 9/27 10/12 10/25 11/2 11/11 11/22 12/7 12/28 1/25/22 2/8 2/23 3/3   Total WeeklyDose 65mg 62.5mg 62.5mg 55mg 57.5mg 57.5mg 57.5mg 57.5mg 57.5 mg 57.5 mg 57.5 mg 60 mg   INR 2.4 2.33 3.83 2.40 2.60 1.96 2.30 2.21 2.03 2.49 1.65 2.10   Notes                 Date  4/5 4/13 4/18 4/25 5/3 5/10 5/18 6/2 6/8 6/24 7/8 7/22   Total WeeklyDose 67.5 mg 62.5 mg 60 mg 56.25 53.75 mg 55 mg 57.5 mg 57.5 mg 57.5 mg 57.5 mg 57.5 mg 57.5 mg   INR 1.51 2.47 2.85 3.68 3.33 2.36 2.27 2.45  2.40 2.38 1.98 2.09   Notes proced holdx5  Medrol + keflex Medrol + keflex voltaren gel, norco redx2 rec 5/19  Mis-dose rec'd 6/27  allopurinol       Date 8./11 8/19 8/29 9/2 9/16 9/30 10/3 10/17 11/1 11/15 12/5 1/3/23   Total WeeklyDose 57.5 mg 60 mg 62.5 mg 62.5 mg 62.5 mg 62.5 mg 62.5 mg 62.5 mg 62.5 mg 62.5 mg 62.5 mg 62.5 mg   INR 1.86 1.83 2.15 2.29 2.69 2.53 2.3 2.19 2.19 2.39 2.3 2.07   Notes       keflex          Date 1/31  3/1 3/7           Total WeeklyDose 62.5 mg 4 day hold 61.25mg 62.5           INR 2.47  1.85 2.53           Notes               * takes warfarin in AM    Phone Interview:  Tablet Strength: 7.5mg and 10mg tablet (12/29/21)  Patient Contact Info: [phone] (mobile, preferred); [phone] (home)  Verbal Release Auth: Charo Schneider (mother)  Lab Contact Info: EDUARDO Gonzalez [phone]; fax [phone]     Patient Findings    Negatives:  Signs/symptoms of thrombosis, Signs/symptoms of bleeding, Laboratory test error suspected, Change in health, Change in alcohol use, Change in activity, Upcoming invasive procedure, Emergency department visit, Upcoming dental procedure, Missed doses, Extra doses, Change in medications, Change in diet/appetite, Hospital admission, Bruising, Other complaints   Comments:  All findings negative per pt.      Plan:  1. INR is therapeutic today at 2.53(goal 2.0-3.0). Instructed Mr. Brennan to continue warfarin 10 mg oral daily except warfarin 7.5 mg MonWedFri. He takes warfarin in AM.   2. Repeat INR in two weeks, 3/21/2023.   3. Patient is considering starting superbeet supplement. Appears only beets are the ingredients. He will let us know  If this is something he plans to start and determine if it affects his INR.  4. Verbal information provided over the phone. Tito R Harp RBV dosing instructions, expresses understanding by teach back, and has no further questions at this time.    Memo Caballero CP  3/7/2023  13:19 YUDELKA HAM, Prim[facility]and, [facility], have reviewed  the note in full and agree with the assessment and plan.  03/07/23  15:36 EST

## 2023-03-20 ENCOUNTER — ANTICOAGULATION VISIT (OUTPATIENT)
Dept: PHARMACY | Facility: HOSPITAL | Age: 63
End: 2023-03-20
Payer: COMMERCIAL

## 2023-03-20 LAB — INR PPP: 2.69

## 2023-03-20 NOTE — PROGRESS NOTES
Anticoagulation Clinic - Remote Progress Note  Remote Lab    Indication: paroxysmal afib   Referring Provider: Ursula  Initial Warfarin Start Date: 18-19 years ago (2001)  Goal INR: 2.0-3.0  Current Drug Interactions: pantaprozole (moderate); Fish oil and black cherry pills occassionally  CHADS-VASc: 3 (HF, HTN)    Diet: some GLV (green beans etc-no cooked greens) 6/27/22  Alcohol:  no  Tobacco: no  OTC Pain Medication: occationally tylenol for sinus HA    INR History:  Date 3/2/20 3/10 3/16 3/24 4/1 4/10 4/24 5/15 6/11 6/25 7/9 7/30 8/27   Total WeeklyDose 45 mg / 5 days 70mg 57.5mg 60mg 57.5mg 60mg 60mg 60mg 60mg 62.5mg 62.5mg 62.5mg 62.5mg   INR 1.84 4.02 3.0 3.28 2.38 2.71 2.26 2.03 1.96 2.06 2.46 2.20 2.38   Notes postop  1x hold misdose         recv'd 8/28     Date 9/24 10/22 10/29 12/1 12/2 12/7 12/14 1/5/21 2/4 3/7 4/15 4/27 5/12   Total WeeklyDose 62.5mg 62.5mg 62.5mg 62.5mg 62.5mg 55mg 62.5mg 62.5mg 62.5mg 62.5mg 62.5mg 60mg 62.5mg   INR 2.27 2.23 2.10 6.90 3.97 2.52 2.22 2.94 2.46 2.68 2.50 2.31 2.58   Notes  Bactrim; clinda  ??? Lab error?  1x miss recv'd 12/15  recv'd 2/8    1x miss; recv'd 5/13     Date 5/18 6/2 6/11 6/21 7/6 8/3 8/6 8/9 8/16 8/22 9/2 9/13 9/20   Total WeeklyDose 62.5mg 52.5mg 66.25  mg 62.5 mg 62.5 mg 58.75 mg 65mg 47.5mg 65mg 60mg 60mg 60mg 65mg   INR 2.06 2.01 3.26 2.25 2.79 1.22 6.07 1.23 3.39 2.36 2.08 1.63 1.9   Notes  1x miss 1xboost   3x hold; 1xboost 2xboost 2x hold  recv'd 8/23  3x hold; 1xboost      Date 9/27 10/12 10/25 11/2 11/11 11/22 12/7 12/28 1/25/22 2/8 2/23 3/3   Total WeeklyDose 65mg 62.5mg 62.5mg 55mg 57.5mg 57.5mg 57.5mg 57.5mg 57.5 mg 57.5 mg 57.5 mg 60 mg   INR 2.4 2.33 3.83 2.40 2.60 1.96 2.30 2.21 2.03 2.49 1.65 2.10   Notes                 Date  4/5 4/13 4/18 4/25 5/3 5/10 5/18 6/2 6/8 6/24 7/8 7/22   Total WeeklyDose 67.5 mg 62.5 mg 60 mg 56.25 53.75 mg 55 mg 57.5 mg 57.5 mg 57.5 mg 57.5 mg 57.5 mg 57.5 mg   INR 1.51 2.47 2.85 3.68 3.33 2.36 2.27 2.45  2.40 2.38 1.98 2.09   Notes proced holdx5  Medrol + keflex Medrol + keflex voltaren gel, norco redx2 rec 5/19  Mis-dose rec'd 6/27  allopurinol       Date 8./11 8/19 8/29 9/2 9/16 9/30 10/3 10/17 11/1 11/15 12/5 1/3/23   Total WeeklyDose 57.5 mg 60 mg 62.5 mg 62.5 mg 62.5 mg 62.5 mg 62.5 mg 62.5 mg 62.5 mg 62.5 mg 62.5 mg 62.5 mg   INR 1.86 1.83 2.15 2.29 2.69 2.53 2.3 2.19 2.19 2.39 2.3 2.07   Notes       keflex          Date 1/31  3/1 3/7 3/20          Total WeeklyDose 62.5 mg 4 day hold 61.25mg 62.5 mg 62.5 mg          INR 2.47  1.85 2.53 2.69          Notes               * takes warfarin in AM    Phone Interview:  Tablet Strength: 7.5mg and 10mg tablet (12/29/21)  Patient Contact Info: [phone] (mobile, preferred); [phone] (home)  Verbal Release Auth: Charo Schneider (mother)  Lab Contact Info: EDUARDO Gonzalez [phone]; fax [phone]     Patient Findings  Negatives:  Signs/symptoms of thrombosis, Signs/symptoms of bleeding, Laboratory test error suspected, Change in health, Change in alcohol use, Change in activity, Upcoming invasive procedure, Emergency department visit, Upcoming dental procedure, Missed doses, Extra doses, Change in medications, Change in diet/appetite, Hospital admission, Bruising, Other complaints   Comments:  All findings negative per pt.      Plan:  1. INR is therapeutic today at 2.69 (goal 2.0-3.0). Instructed Mr. Brennan to continue warfarin 10 mg oral daily except warfarin 7.5 mg MonWedFri. He takes warfarin in AM.   2. Repeat INR in two weeks, 4/3/2023.   3. Patient is considering starting superbeet supplement. Appears only beets are the ingredients. He will let us know  If this is something he plans to start and determine if it affects his INR.  4. Verbal information provided over the phone. Tito Brennan RBV dosing instructions, expresses understanding by teach back, and has no further questions at this time.    Memo Caballero CPhT  3/20/2023  10:55 DAVID HAM, Leyla Saba,  PharmD, have reviewed the note in full and agree with the assessment and plan.  03/20/23  11:02 EDT

## 2023-03-22 ENCOUNTER — OFFICE VISIT (OUTPATIENT)
Dept: CARDIOLOGY | Facility: CLINIC | Age: 63
End: 2023-03-22
Payer: COMMERCIAL

## 2023-03-22 VITALS
HEIGHT: 73 IN | HEART RATE: 95 BPM | SYSTOLIC BLOOD PRESSURE: 142 MMHG | OXYGEN SATURATION: 98 % | BODY MASS INDEX: 37.03 KG/M2 | DIASTOLIC BLOOD PRESSURE: 86 MMHG | WEIGHT: 279.4 LBS

## 2023-03-22 DIAGNOSIS — I48.21 PERMANENT ATRIAL FIBRILLATION: Primary | ICD-10-CM

## 2023-03-22 DIAGNOSIS — I10 ESSENTIAL HYPERTENSION: ICD-10-CM

## 2023-03-22 DIAGNOSIS — I42.8 NICM (NONISCHEMIC CARDIOMYOPATHY): ICD-10-CM

## 2023-03-22 PROCEDURE — 3079F DIAST BP 80-89 MM HG: CPT | Performed by: PHYSICIAN ASSISTANT

## 2023-03-22 PROCEDURE — 93000 ELECTROCARDIOGRAM COMPLETE: CPT | Performed by: PHYSICIAN ASSISTANT

## 2023-03-22 PROCEDURE — 3077F SYST BP >= 140 MM HG: CPT | Performed by: PHYSICIAN ASSISTANT

## 2023-03-22 PROCEDURE — 99213 OFFICE O/P EST LOW 20 MIN: CPT | Performed by: PHYSICIAN ASSISTANT

## 2023-03-22 RX ORDER — CILOSTAZOL 100 MG/1
TABLET ORAL 2 TIMES DAILY
COMMUNITY
Start: 2023-03-20

## 2023-03-22 NOTE — PROGRESS NOTES
Tito BROWNING [facility]  1960  [phone]    03/22/2023    [facility]     Referring Provider: No ref. provider found     Gabe Mims MD  [address] [facility] [place] [address]    Chief Complaint   Patient presents with   • Permanent atrial fibrillation      Problem List:   1. Persistent atrial fibrillation  a. CHADSVASc = 3, on warfarin  b. Diagnosed ~2000 at time of colonoscopy, asymptomatic  c. Admitted for Tikosyn initiation, 06/2019, failed ECV x 3, ERAF after ICV, Tikosyn discontinued  2. NICM  a. Echocardiogram 12/28/2018: EF 40%, LA size 5.2 cm in atrial fibrillation during exam  b. Echo 10/10/2019 LVEF 40%, Mild MR  c. Echocardiogram 3/16/2022: EF 46-50%, RVSP < 35 mmHg  3. Coronary artery disease  a. Nuclear stress test 2/13/2019: Moderate size, moderate intensity anteroseptal wall reversible defect suggestive of ischemia, EF 37%  b. Left heart catheterization 3/6/2019: Nonobstructive, noncritical coronary artery disease to the distal RCA, 20-30%, EF 35-40%  c. Echo 10/10/2019 LVEF 40%, Mild MR  4. Diabetes mellitus type II  5. Hypertension  6. PAD - followed in [place], on Pletal   7. Hand surgery 2/2023     Allergies  No Known Allergies    Current Medications    Current Outpatient Medications:   •  cetirizine (zyrTEC) 10 MG tablet, Take 1 tablet by mouth Daily., Disp: , Rfl:   •  cilostazol (PLETAL) 100 MG tablet, 2 (Two) Times a Day., Disp: , Rfl:   •  Entresto 49-51 MG tablet, TAKE ONE TABLET BY MOUTH TWO TIMES A DAY, Disp: 60 tablet, Rfl: 5  •  Magnesium Oxide 400 (240 Mg) MG tablet, Take 1 tablet by mouth Daily., Disp: 30 tablet, Rfl: 11  •  metFORMIN (GLUCOPHAGE) 1000 MG tablet, Take 1 tablet by mouth 2 (Two) Times a Day With Meals., Disp: , Rfl:   •  metoprolol succinate XL (TOPROL-XL) 100 MG 24 hr tablet, Take 1 tablet by mouth 2 (Two) Times a Day., Disp: 60 tablet, Rfl: 4  •  metoprolol tartrate (LOPRESSOR) 25 MG tablet, Take 1 tablet by mouth As Needed  "(palpitatons, hr >100)., Disp: 60 tablet, Rfl: 11  •  pantoprazole (PROTONIX) 40 MG EC tablet, Take 1 tablet by mouth Daily., Disp: 30 tablet, Rfl: 6  •  sildenafil (VIAGRA) 100 MG tablet, Take 1 tablet by mouth Daily As Needed for Erectile Dysfunction., Disp: , Rfl:   •  warfarin (COUMADIN) 10 MG tablet, Take 1 tablet by mouth daily or as directed by anticoagulation clinic., Disp: 65 tablet, Rfl: 1  •  warfarin (COUMADIN) 7.5 MG tablet, Take 1 tablet by mouth daily, or as directed by the anticoagulation clinic., Disp: 90 tablet, Rfl: 1    History of Present Illness     Pt presents for follow up of atrial fibrillation, NICM, HTN. Since we last saw the pt, pt denies any awareness of AF episodes, SOB, CP, LH, and dizziness, syncope. Denies any hospitalizations, ER visits, bleeding, or TIA/CVA symptoms. Overall feels well. He is following with an interventionalist who specializes in PAD in [place] and has been placed on Pletal, which has improved his symptoms. He had hand surgery a few weeks ago as well.     ROS:  General:  Denies fatigue, weight gain or loss  Cardiovascular:  Denies CP, PND, syncope, near syncope, edema or palpitations.  Pulmonary:  Denies GOODWIN, cough, or wheezing      Vitals:    03/22/23 1144   BP: 142/86   BP Location: Left arm   Patient Position: Sitting   Pulse: 95   SpO2: 98%   Weight: 127 kg (279 lb 6.4 oz)   Height: 185.4 cm (73\")     Body mass index is 36.86 kg/m².  PE:  General: NAD  Neck: no JVD, no carotid bruits, no TM  Heart: irr, irr , NL S1, S2, no rubs, murmurs  Lungs: CTA, no wheezes, rhonchi, or rales  Abd: soft, non-tender, NL BS  Ext: No musculoskeletal deformities, no edema, cyanosis, or clubbing  Psych: normal mood and affect    Diagnostic Data:        ECG 12 Lead    Date/Time: 3/22/2023 12:01 PM  Performed by: Kayla Strong PA  Authorized by: Kayla Strong PA   Comparison: compared with previous ECG from 3/16/2022  Similar to previous ECG  Rhythm: atrial fibrillation  BPM: " 95              Advance Care Planning   ACP discussion was declined by the patient. Patient does not have an advance directive, declines further assistance.       1. Permanent atrial fibrillation (HCC)    2. NICM (nonischemic cardiomyopathy) (HCC)    3. Essential hypertension          Plan:  1. Permanent Atrial Fibrillation:   - asymptomatic,  HR stable    2. NICM:   - EF 46-50% Entresto 49/51 BID and Metoprolol XL  - Class I CHF symptoms.    3. HTN:  -  Controlled on BB, Entresto  4. Anticoagulation:   - Chadsvasc=4 on warfarin, INRs followed by anticoagulation clinic. Discussed NOAC, but he would rather continue warfarin.     F/up in 12 months    Electronically signed by KELIN Farias, 03/22/23, 12:00 PM EDT.

## 2023-04-05 ENCOUNTER — ANTICOAGULATION VISIT (OUTPATIENT)
Dept: PHARMACY | Facility: HOSPITAL | Age: 63
End: 2023-04-05
Payer: COMMERCIAL

## 2023-04-05 LAB — INR PPP: 2.86

## 2023-04-05 NOTE — PROGRESS NOTES
Anticoagulation Clinic - Remote Progress Note  Remote Lab    Indication: paroxysmal afib   Referring Provider: Ursula  Initial Warfarin Start Date: 18-19 years ago (2001)  Goal INR: 2.0-3.0  Current Drug Interactions: pantaprozole (moderate); Fish oil and black cherry pills occassionally  CHADS-VASc: 3 (HF, HTN)    Diet: some GLV (green beans etc-no cooked greens) 6/27/22  Alcohol:  no  Tobacco: no  OTC Pain Medication: occationally tylenol for sinus HA    INR History:  Date 3/2/20 3/10 3/16 3/24 4/1 4/10 4/24 5/15 6/11 6/25 7/9 7/30 8/27   Total WeeklyDose 45 mg / 5 days 70mg 57.5mg 60mg 57.5mg 60mg 60mg 60mg 60mg 62.5mg 62.5mg 62.5mg 62.5mg   INR 1.84 4.02 3.0 3.28 2.38 2.71 2.26 2.03 1.96 2.06 2.46 2.20 2.38   Notes postop  1x hold misdose         recv'd 8/28     Date 9/24 10/22 10/29 12/1 12/2 12/7 12/14 1/5/21 2/4 3/7 4/15 4/27 5/12   Total WeeklyDose 62.5mg 62.5mg 62.5mg 62.5mg 62.5mg 55mg 62.5mg 62.5mg 62.5mg 62.5mg 62.5mg 60mg 62.5mg   INR 2.27 2.23 2.10 6.90 3.97 2.52 2.22 2.94 2.46 2.68 2.50 2.31 2.58   Notes  Bactrim; clinda  ??? Lab error?  1x miss recv'd 12/15  recv'd 2/8    1x miss; recv'd 5/13     Date 5/18 6/2 6/11 6/21 7/6 8/3 8/6 8/9 8/16 8/22 9/2 9/13 9/20   Total WeeklyDose 62.5mg 52.5mg 66.25  mg 62.5 mg 62.5 mg 58.75 mg 65mg 47.5mg 65mg 60mg 60mg 60mg 65mg   INR 2.06 2.01 3.26 2.25 2.79 1.22 6.07 1.23 3.39 2.36 2.08 1.63 1.9   Notes  1x miss 1xboost   3x hold; 1xboost 2xboost 2x hold  recv'd 8/23  3x hold; 1xboost      Date 9/27 10/12 10/25 11/2 11/11 11/22 12/7 12/28 1/25/22 2/8 2/23 3/3   Total WeeklyDose 65mg 62.5mg 62.5mg 55mg 57.5mg 57.5mg 57.5mg 57.5mg 57.5 mg 57.5 mg 57.5 mg 60 mg   INR 2.4 2.33 3.83 2.40 2.60 1.96 2.30 2.21 2.03 2.49 1.65 2.10   Notes                 Date  4/5 4/13 4/18 4/25 5/3 5/10 5/18 6/2 6/8 6/24 7/8 7/22   Total WeeklyDose 67.5 mg 62.5 mg 60 mg 56.25 53.75 mg 55 mg 57.5 mg 57.5 mg 57.5 mg 57.5 mg 57.5 mg 57.5 mg   INR 1.51 2.47 2.85 3.68 3.33 2.36 2.27 2.45  2.40 2.38 1.98 2.09   Notes proced holdx5  Medrol + keflex Medrol + keflex voltaren gel, norco redx2 rec 5/19  Mis-dose rec'd 6/27  allopurinol       Date 8./11 8/19 8/29 9/2 9/16 9/30 10/3 10/17 11/1 11/15 12/5 1/3/23   Total WeeklyDose 57.5 mg 60 mg 62.5 mg 62.5 mg 62.5 mg 62.5 mg 62.5 mg 62.5 mg 62.5 mg 62.5 mg 62.5 mg 62.5 mg   INR 1.86 1.83 2.15 2.29 2.69 2.53 2.3 2.19 2.19 2.39 2.3 2.07   Notes       keflex          Date 1/31  3/1 3/7 3/20 4/5         Total WeeklyDose 62.5 mg 4 day hold 61.25mg 62.5 mg 62.5 mg 62.5 mg         INR 2.47  1.85 2.53 2.69 2.86         Notes               * takes warfarin in AM    Phone Interview:  Tablet Strength: 7.5mg and 10mg tablet (12/29/21)  Patient Contact Info: [phone] (mobile, preferred); [phone] (home)  Verbal Release Auth: Charo Schneider (mother)  Lab Contact Info: EDUARDO Gonzalez [phone]; fax [phone]    Patient Findings  Negatives:  Signs/symptoms of thrombosis, Signs/symptoms of bleeding, Laboratory test error suspected, Change in health, Change in alcohol use, Change in activity, Upcoming invasive procedure, Emergency department visit, Upcoming dental procedure, Missed doses, Extra doses, Change in medications, Change in diet/appetite, Hospital admission, Bruising, Other complaints   Comments:  He denies changes, though INR has been trending upward.   He was agreeable to adding an additional serving of green beans.   He is out of town the week of 4/7, he will recheck his INR on Monday, 4/24   Above findings negative     Plan:    1. INR is therapeutic today at 2.86 (goal 2.0-3.0). Instructed Mr. Brennan to continue warfarin 10 mg oral daily except warfarin 7.5 mg MonWedFri. He takes warfarin in AM. He plans to increase his intake of foods that contain vitamin K.  2. Repeat INR on 4/24 as he is out of town the week of 4/17   3. Patient is considering starting superbeet supplement. Appears only beets are the ingredients. He will let us know  If this is  something he plans to start and determine if it affects his INR.  4. Verbal information provided over the phone. Tito Brennan RBV dosing instructions, expresses understanding by teach back, and has no further questions at this time.    .Bing lFynn, PharmD  4/5/2023  11:27 EDT

## 2023-04-24 ENCOUNTER — ANTICOAGULATION VISIT (OUTPATIENT)
Dept: PHARMACY | Facility: HOSPITAL | Age: 63
End: 2023-04-24
Payer: COMMERCIAL

## 2023-04-24 LAB — INR PPP: 2.38

## 2023-04-24 NOTE — PROGRESS NOTES
Anticoagulation Clinic - Remote Progress Note  Remote Lab    Indication: paroxysmal afib   Referring Provider: Ursula  Initial Warfarin Start Date: 18-19 years ago (2001)  Goal INR: 2.0-3.0  Current Drug Interactions: pantaprozole (moderate); Fish oil and black cherry pills occassionally  CHADS-VASc: 3 (HF, HTN)    Diet: some GLV (green beans etc-no cooked greens) 6/27/22  Alcohol:  no  Tobacco: no  OTC Pain Medication: occationally tylenol for sinus HA    INR History:  Date 3/2/20 3/10 3/16 3/24 4/1 4/10 4/24 5/15 6/11 6/25 7/9 7/30 8/27   Total WeeklyDose 45 mg / 5 days 70mg 57.5mg 60mg 57.5mg 60mg 60mg 60mg 60mg 62.5mg 62.5mg 62.5mg 62.5mg   INR 1.84 4.02 3.0 3.28 2.38 2.71 2.26 2.03 1.96 2.06 2.46 2.20 2.38   Notes postop  1x hold misdose         recv'd 8/28     Date 9/24 10/22 10/29 12/1 12/2 12/7 12/14 1/5/21 2/4 3/7 4/15 4/27 5/12   Total WeeklyDose 62.5mg 62.5mg 62.5mg 62.5mg 62.5mg 55mg 62.5mg 62.5mg 62.5mg 62.5mg 62.5mg 60mg 62.5mg   INR 2.27 2.23 2.10 6.90 3.97 2.52 2.22 2.94 2.46 2.68 2.50 2.31 2.58   Notes  Bactrim; clinda  ??? Lab error?  1x miss recv'd 12/15  recv'd 2/8    1x miss; recv'd 5/13     Date 5/18 6/2 6/11 6/21 7/6 8/3 8/6 8/9 8/16 8/22 9/2 9/13 9/20   Total WeeklyDose 62.5mg 52.5mg 66.25  mg 62.5 mg 62.5 mg 58.75 mg 65mg 47.5mg 65mg 60mg 60mg 60mg 65mg   INR 2.06 2.01 3.26 2.25 2.79 1.22 6.07 1.23 3.39 2.36 2.08 1.63 1.9   Notes  1x miss 1xboost   3x hold; 1xboost 2xboost 2x hold  recv'd 8/23  3x hold; 1xboost      Date 9/27 10/12 10/25 11/2 11/11 11/22 12/7 12/28 1/25/22 2/8 2/23 3/3   Total WeeklyDose 65mg 62.5mg 62.5mg 55mg 57.5mg 57.5mg 57.5mg 57.5mg 57.5 mg 57.5 mg 57.5 mg 60 mg   INR 2.4 2.33 3.83 2.40 2.60 1.96 2.30 2.21 2.03 2.49 1.65 2.10   Notes                 Date  4/5 4/13 4/18 4/25 5/3 5/10 5/18 6/2 6/8 6/24 7/8 7/22   Total WeeklyDose 67.5 mg 62.5 mg 60 mg 56.25 53.75 mg 55 mg 57.5 mg 57.5 mg 57.5 mg 57.5 mg 57.5 mg 57.5 mg   INR 1.51 2.47 2.85 3.68 3.33 2.36 2.27 2.45  2.40 2.38 1.98 2.09   Notes proced holdx5  Medrol + keflex Medrol + keflex voltaren gel, norco redx2 rec 5/19  Mis-dose rec'd 6/27  allopurinol       Date 8./11 8/19 8/29 9/2 9/16 9/30 10/3 10/17 11/1 11/15 12/5 1/3/23   Total WeeklyDose 57.5 mg 60 mg 62.5 mg 62.5 mg 62.5 mg 62.5 mg 62.5 mg 62.5 mg 62.5 mg 62.5 mg 62.5 mg 62.5 mg   INR 1.86 1.83 2.15 2.29 2.69 2.53 2.3 2.19 2.19 2.39 2.3 2.07   Notes       keflex          Date 1/31  3/1 3/7 3/20 4/5 4/24/23        Total WeeklyDose 62.5 mg 4 day hold 61.25mg 62.5 mg 62.5 mg 62.5 mg 62.5 mg         INR 2.47  1.85 2.53 2.69 2.86 2.38        Notes               * takes warfarin in AM    Phone Interview:  Tablet Strength: 7.5mg and 10mg tablet (12/29/21)  Patient Contact Info: [phone] (mobile, preferred); [phone] (home)  Verbal Release Auth: Charo Schneider (mother)  Lab Contact Info: EDUARDO Gonzalez [phone]; fax [phone]      Patient Findings  Negatives:  Signs/symptoms of thrombosis, Signs/symptoms of bleeding, Laboratory test error suspected, Change in health, Change in alcohol use, Change in activity, Upcoming invasive procedure, Emergency department visit, Upcoming dental procedure, Missed doses, Extra doses, Change in medications, Change in diet/appetite, Hospital admission, Bruising, Other complaints   Comments:  All findings negative per patient          Plan:  1. INR is therapeutic today at 2.86 (goal 2.0-3.0). Instructed Mr. Brennan to continue warfarin 10 mg oral daily except warfarin 7.5 mg MonWedFri. He takes warfarin in AM. He plans to increase his intake of foods that contain vitamin K.  2. Repeat INR in 4 weeks on 5/22  3. Patient is considering starting superbeet supplement. Appears only beets are the ingredients. He will let us know  If this is something he plans to start and determine if it affects his INR.  4. Verbal information provided over the phone. Tito Brennan RBV dosing instructions, expresses understanding by teach back, and has no  further questions at this time.    .Naseem Schmidt, Pharmacy Technician  4/24/2023  16:05 EDT       I, Jordana Desir, PharmD, have reviewed the note in full and agree with the assessment and plan.  04/25/23  08:57 EDT

## 2023-05-23 ENCOUNTER — ANTICOAGULATION VISIT (OUTPATIENT)
Dept: PHARMACY | Facility: HOSPITAL | Age: 63
End: 2023-05-23
Payer: COMMERCIAL

## 2023-05-23 LAB — INR PPP: 3

## 2023-05-23 NOTE — PROGRESS NOTES
Anticoagulation Clinic - Remote Progress Note  Remote Lab    Indication: paroxysmal afib   Referring Provider: Ursula  Initial Warfarin Start Date: 18-19 years ago (2001)  Goal INR: 2.0-3.0  Current Drug Interactions: pantaprozole (moderate); Fish oil and black cherry pills occassionally  CHADS-VASc: 3 (HF, HTN)    Diet: some GLV (green beans etc-no cooked greens) 6/27/22  Alcohol:  no  Tobacco: no  OTC Pain Medication: occationally tylenol for sinus HA    INR History:  Date 3/2/20 3/10 3/16 3/24 4/1 4/10 4/24 5/15 6/11 6/25 7/9 7/30 8/27   Total WeeklyDose 45 mg / 5 days 70mg 57.5mg 60mg 57.5mg 60mg 60mg 60mg 60mg 62.5mg 62.5mg 62.5mg 62.5mg   INR 1.84 4.02 3.0 3.28 2.38 2.71 2.26 2.03 1.96 2.06 2.46 2.20 2.38   Notes postop  1x hold misdose         recv'd 8/28     Date 9/24 10/22 10/29 12/1 12/2 12/7 12/14 1/5/21 2/4 3/7 4/15 4/27 5/12   Total WeeklyDose 62.5mg 62.5mg 62.5mg 62.5mg 62.5mg 55mg 62.5mg 62.5mg 62.5mg 62.5mg 62.5mg 60mg 62.5mg   INR 2.27 2.23 2.10 6.90 3.97 2.52 2.22 2.94 2.46 2.68 2.50 2.31 2.58   Notes  Bactrim; clinda  ??? Lab error?  1x miss recv'd 12/15  recv'd 2/8    1x miss; recv'd 5/13     Date 5/18 6/2 6/11 6/21 7/6 8/3 8/6 8/9 8/16 8/22 9/2 9/13 9/20   Total WeeklyDose 62.5mg 52.5mg 66.25  mg 62.5 mg 62.5 mg 58.75 mg 65mg 47.5mg 65mg 60mg 60mg 60mg 65mg   INR 2.06 2.01 3.26 2.25 2.79 1.22 6.07 1.23 3.39 2.36 2.08 1.63 1.9   Notes  1x miss 1xboost   3x hold; 1xboost 2xboost 2x hold  recv'd 8/23  3x hold; 1xboost      Date 9/27 10/12 10/25 11/2 11/11 11/22 12/7 12/28 1/25/22 2/8 2/23 3/3   Total WeeklyDose 65mg 62.5mg 62.5mg 55mg 57.5mg 57.5mg 57.5mg 57.5mg 57.5 mg 57.5 mg 57.5 mg 60 mg   INR 2.4 2.33 3.83 2.40 2.60 1.96 2.30 2.21 2.03 2.49 1.65 2.10   Notes                 Date  4/5 4/13 4/18 4/25 5/3 5/10 5/18 6/2 6/8 6/24 7/8 7/22   Total WeeklyDose 67.5 mg 62.5 mg 60 mg 56.25 53.75 mg 55 mg 57.5 mg 57.5 mg 57.5 mg 57.5 mg 57.5 mg 57.5 mg   INR 1.51 2.47 2.85 3.68 3.33 2.36 2.27 2.45  2.40 2.38 1.98 2.09   Notes proced holdx5  Medrol + keflex Medrol + keflex voltaren gel, norco redx2 rec 5/19  Mis-dose rec'd 6/27  allopurinol       Date 8./11 8/19 8/29 9/2 9/16 9/30 10/3 10/17 11/1 11/15 12/5 1/3/23   Total WeeklyDose 57.5 mg 60 mg 62.5 mg 62.5 mg 62.5 mg 62.5 mg 62.5 mg 62.5 mg 62.5 mg 62.5 mg 62.5 mg 62.5 mg   INR 1.86 1.83 2.15 2.29 2.69 2.53 2.3 2.19 2.19 2.39 2.3 2.07   Notes       keflex          Date 1/31  3/1 3/7 3/20 4/5 4/24/23 5/23/23       Total WeeklyDose 62.5 mg 4 day hold 61.25mg 62.5 mg 62.5 mg 62.5 mg 62.5 mg  62.5 mg       INR 2.47  1.85 2.53 2.69 2.86 2.38 3.0       Notes               * takes warfarin in AM    Phone Interview:  Tablet Strength: 7.5mg and 10mg tablet (12/29/21)  Patient Contact Info: [phone] (mobile, preferred); [phone] (home)  Verbal Release Auth: Charo Schneider (mother)  Lab Contact Info: EDUARDO Gonzalez [phone]; fax [phone]      Patient Findings  Negatives:  Signs/symptoms of thrombosis, Signs/symptoms of bleeding, Laboratory test error suspected, Change in health, Change in alcohol use, Change in activity, Upcoming invasive procedure, Emergency department visit, Upcoming dental procedure, Missed doses, Extra doses, Change in medications, Change in diet/appetite, Hospital admission, Bruising, Other complaints   Comments:  All findings negative per patient          Plan:  1. INR is therapeutic today at 3.0 (goal 2.0-3.0). Instructed Mr. Brennan to continue warfarin 10 mg oral daily except warfarin 7.5 mg MonWedFri. He takes warfarin in AM. He plans to increase his intake of foods that contain vitamin K.  2. Repeat INR in 4 weeks on 6/20  3. Patient is considering starting superbeet supplement. Appears only beets are the ingredients. He will let us know  If this is something he plans to start and determine if it affects his INR.  4. Verbal information provided over the phone. Tito Brennan RBV dosing instructions, expresses understanding by teach  back, and has no further questions at this time.    .Naseem Schmidt, Pharmacy Technician  5/23/2023  16:25 EDT     I, Andrew Karimi, PharmD, have reviewed the note in full and agree with the assessment and plan.  05/24/23  10:14 EDT

## 2023-07-25 ENCOUNTER — TELEPHONE (OUTPATIENT)
Dept: PHARMACY | Facility: HOSPITAL | Age: 63
End: 2023-07-25

## 2023-07-25 NOTE — TELEPHONE ENCOUNTER
Mr. Brennan Called to inform the Anticoagulation clinic he will start cephalexin 500 mg  QID for seven days. Per Bing Flynn, PharmD, Instructed patient to continue warfarin regimen as normal, but recheck at the end of the week 7/28/23.   Patient also reported he will hold off on starting his new Glucosamine supplement until further notice   Naseem Schmidt, Pharmacy Technician  7/25/2023  14:12 EDT

## 2023-07-28 ENCOUNTER — ANTICOAGULATION VISIT (OUTPATIENT)
Dept: PHARMACY | Facility: HOSPITAL | Age: 63
End: 2023-07-28
Payer: COMMERCIAL

## 2023-07-28 LAB — INR PPP: 2.28

## 2023-07-28 NOTE — PROGRESS NOTES
Anticoagulation Clinic - Remote Progress Note  Remote Lab    Indication: paroxysmal afib   Referring Provider: Ursula  Initial Warfarin Start Date: 18-19 years ago (2001)  Goal INR: 2.0-3.0  Current Drug Interactions: pantaprozole (moderate); Fish oil and black cherry pills occassionally  CHADS-VASc: 3 (HF, HTN)    Diet: some GLV (green beans etc-no cooked greens) 6/27/22  Alcohol:  no  Tobacco: no  OTC Pain Medication: occationally tylenol for sinus HA    INR History:  Date 3/2/20 3/10 3/16 3/24 4/1 4/10 4/24 5/15 6/11 6/25 7/9 7/30 8/27   Total WeeklyDose 45 mg / 5 days 70mg 57.5mg 60mg 57.5mg 60mg 60mg 60mg 60mg 62.5mg 62.5mg 62.5mg 62.5mg   INR 1.84 4.02 3.0 3.28 2.38 2.71 2.26 2.03 1.96 2.06 2.46 2.20 2.38   Notes postop  1x hold misdose         recv'd 8/28     Date 9/24 10/22 10/29 12/1 12/2 12/7 12/14 1/5/21 2/4 3/7 4/15 4/27 5/12   Total WeeklyDose 62.5mg 62.5mg 62.5mg 62.5mg 62.5mg 55mg 62.5mg 62.5mg 62.5mg 62.5mg 62.5mg 60mg 62.5mg   INR 2.27 2.23 2.10 6.90 3.97 2.52 2.22 2.94 2.46 2.68 2.50 2.31 2.58   Notes  Bactrim; clinda  ??? Lab error?  1x miss recv'd 12/15  recv'd 2/8    1x miss; recv'd 5/13     Date 5/18 6/2 6/11 6/21 7/6 8/3 8/6 8/9 8/16 8/22 9/2 9/13 9/20   Total WeeklyDose 62.5mg 52.5mg 66.25  mg 62.5 mg 62.5 mg 58.75 mg 65mg 47.5mg 65mg 60mg 60mg 60mg 65mg   INR 2.06 2.01 3.26 2.25 2.79 1.22 6.07 1.23 3.39 2.36 2.08 1.63 1.9   Notes  1x miss 1xboost   3x hold; 1xboost 2xboost 2x hold  recv'd 8/23  3x hold; 1xboost      Date 9/27 10/12 10/25 11/2 11/11 11/22 12/7 12/28 1/25/22 2/8 2/23 3/3   Total WeeklyDose 65mg 62.5mg 62.5mg 55mg 57.5mg 57.5mg 57.5mg 57.5mg 57.5 mg 57.5 mg 57.5 mg 60 mg   INR 2.4 2.33 3.83 2.40 2.60 1.96 2.30 2.21 2.03 2.49 1.65 2.10   Notes                 Date  4/5 4/13 4/18 4/25 5/3 5/10 5/18 6/2 6/8 6/24 7/8 7/22   Total WeeklyDose 67.5 mg 62.5 mg 60 mg 56.25 53.75 mg 55 mg 57.5 mg 57.5 mg 57.5 mg 57.5 mg 57.5 mg 57.5 mg   INR 1.51 2.47 2.85 3.68 3.33 2.36 2.27 2.45  2.40 2.38 1.98 2.09   Notes proced holdx5  Medrol + keflex Medrol + keflex voltaren gel, norco redx2 rec 5/19  Mis-dose rec'd 6/27  allopurinol       Date 8./11 8/19 8/29 9/2 9/16 9/30 10/3 10/17 11/1 11/15 12/5 1/3/23   Total WeeklyDose 57.5 mg 60 mg 62.5 mg 62.5 mg 62.5 mg 62.5 mg 62.5 mg 62.5 mg 62.5 mg 62.5 mg 62.5 mg 62.5 mg   INR 1.86 1.83 2.15 2.29 2.69 2.53 2.3 2.19 2.19 2.39 2.3 2.07   Notes       keflex          Date 1/31  3/1 3/7 3/20 4/5 4/24/23 5/23/23 6/21/23 7/20/23 07/28/23    Total WeeklyDose 62.5 mg 4 day hold 61.25mg 62.5 mg 62.5 mg 62.5 mg 62.5 mg  62.5 mg 62.5 mg 62.5 mg 62.5 mg    INR 2.47  1.85 2.53 2.69 2.86 2.38 3.0 3.06 2.19 2.28    Notes          Glucosamine  Cephalexin    * takes warfarin in AM    Phone Interview:  Tablet Strength: 7.5mg and 10mg tablet (12/29/21)  Patient Contact Info: [phone] (mobile, preferred); [phone] (home)  Verbal Release Auth: Charo Schneider (mother)  Lab Contact Info: EDUARDO Gonzalez [phone]; fax [phone]      Patient Findings    Positives: Change in medications   Negatives: Signs/symptoms of thrombosis, Signs/symptoms of bleeding, Laboratory test error suspected, Change in health, Change in alcohol use, Change in activity, Upcoming invasive procedure, Emergency department visit, Upcoming dental procedure, Missed doses, Extra doses, Change in diet/appetite, Hospital admission, Bruising, Other complaints   Comments: Cephalexin 500 mg QID for 7 days, started on Tuesday (07/25/523)  All other findings negative per pt.     Plan:  1. INR is therapeutic today at 2.28 (goal 2.0-3.0). Per Ira Veliz, PharmD, instructed Mr. Brennan to continue warfarin 10 mg oral daily except warfarin 7.5 mg MonWedFri, and to recheck on 08/01/2023).   2. Repeat INR  on 8/1/2023 D/T new supplement   3. Verbal information provided over the phone. Tito BROWNING Anu RBV dosing instructions, expresses understanding by teach back, and has no further questions at this time.    Kimberly  Jamal  Pharmacy Technician  7/28/2023 15:10 EDT    I, Dayana Tong, PharmD, have reviewed the note in full and agree with the assessment and plan.  07/28/23  16:00 EDT

## 2023-08-01 ENCOUNTER — ANTICOAGULATION VISIT (OUTPATIENT)
Dept: PHARMACY | Facility: HOSPITAL | Age: 63
End: 2023-08-01
Payer: COMMERCIAL

## 2023-08-01 LAB — INR PPP: 2.07

## 2023-08-16 ENCOUNTER — ANTICOAGULATION VISIT (OUTPATIENT)
Dept: PHARMACY | Facility: HOSPITAL | Age: 63
End: 2023-08-16
Payer: COMMERCIAL

## 2023-08-16 LAB — INR PPP: 2.17

## 2023-08-16 NOTE — PROGRESS NOTES
Anticoagulation Clinic - Remote Progress Note  Remote Lab    Indication: paroxysmal afib   Referring Provider: Ursula  Initial Warfarin Start Date: 18-19 years ago (2001)  Goal INR: 2.0-3.0  Current Drug Interactions: pantaprozole (moderate); Fish oil and black cherry pills occassionally  CHADS-VASc: 3 (HF, HTN)    Diet: some GLV (green beans etc-no cooked greens) 6/27/22  Alcohol:  no  Tobacco: no  OTC Pain Medication: occationally tylenol for sinus HA    INR History:  Date 3/2/20 3/10 3/16 3/24 4/1 4/10 4/24 5/15 6/11 6/25 7/9 7/30 8/27   Total WeeklyDose 45 mg / 5 days 70mg 57.5mg 60mg 57.5mg 60mg 60mg 60mg 60mg 62.5mg 62.5mg 62.5mg 62.5mg   INR 1.84 4.02 3.0 3.28 2.38 2.71 2.26 2.03 1.96 2.06 2.46 2.20 2.38   Notes postop  1x hold misdose         recv'd 8/28     Date 9/24 10/22 10/29 12/1 12/2 12/7 12/14 1/5/21 2/4 3/7 4/15 4/27 5/12   Total WeeklyDose 62.5mg 62.5mg 62.5mg 62.5mg 62.5mg 55mg 62.5mg 62.5mg 62.5mg 62.5mg 62.5mg 60mg 62.5mg   INR 2.27 2.23 2.10 6.90 3.97 2.52 2.22 2.94 2.46 2.68 2.50 2.31 2.58   Notes  Bactrim; clinda  ??? Lab error?  1x miss recv'd 12/15  recv'd 2/8    1x miss; recv'd 5/13     Date 5/18 6/2 6/11 6/21 7/6 8/3 8/6 8/9 8/16 8/22 9/2 9/13 9/20   Total WeeklyDose 62.5mg 52.5mg 66.25  mg 62.5 mg 62.5 mg 58.75 mg 65mg 47.5mg 65mg 60mg 60mg 60mg 65mg   INR 2.06 2.01 3.26 2.25 2.79 1.22 6.07 1.23 3.39 2.36 2.08 1.63 1.9   Notes  1x miss 1xboost   3x hold; 1xboost 2xboost 2x hold  recv'd 8/23  3x hold; 1xboost      Date 9/27 10/12 10/25 11/2 11/11 11/22 12/7 12/28 1/25/22 2/8 2/23 3/3   Total WeeklyDose 65mg 62.5mg 62.5mg 55mg 57.5mg 57.5mg 57.5mg 57.5mg 57.5 mg 57.5 mg 57.5 mg 60 mg   INR 2.4 2.33 3.83 2.40 2.60 1.96 2.30 2.21 2.03 2.49 1.65 2.10   Notes                 Date  4/5 4/13 4/18 4/25 5/3 5/10 5/18 6/2 6/8 6/24 7/8 7/22   Total WeeklyDose 67.5 mg 62.5 mg 60 mg 56.25 53.75 mg 55 mg 57.5 mg 57.5 mg 57.5 mg 57.5 mg 57.5 mg 57.5 mg   INR 1.51 2.47 2.85 3.68 3.33 2.36 2.27 2.45  2.40 2.38 1.98 2.09   Notes proced holdx5  Medrol + keflex Medrol + keflex voltaren gel, norco redx2 rec 5/19  Mis-dose rec'd 6/27  allopurinol       Date 8./11 8/19 8/29 9/2 9/16 9/30 10/3 10/17 11/1 11/15 12/5 1/3/23   Total WeeklyDose 57.5 mg 60 mg 62.5 mg 62.5 mg 62.5 mg 62.5 mg 62.5 mg 62.5 mg 62.5 mg 62.5 mg 62.5 mg 62.5 mg   INR 1.86 1.83 2.15 2.29 2.69 2.53 2.3 2.19 2.19 2.39 2.3 2.07   Notes       keflex          Date 1/31  3/1 3/7 3/20 4/5 4/24/23 5/23/23 6/21/23 7/20/23 07/28/23 8/1/23   Total WeeklyDose 62.5 mg 4 day hold 61.25mg 62.5 mg 62.5 mg 62.5 mg 62.5 mg  62.5 mg 62.5 mg 62.5 mg 62.5 mg 62.5 mg    INR 2.47  1.85 2.53 2.69 2.86 2.38 3.0 3.06 2.19 2.28 2.07   Notes          Glucosamine  Cephalexin      Date 08/16              Total WeeklyDose 62.5 mg              INR 2.17              Notes                 * takes warfarin in AM  Phone Interview:  Tablet Strength: 7.5mg and 10mg tablet (12/29/21)  Patient Contact Info: [phone] (mobile, preferred); [phone] (home)  Verbal Release Auth: Charo Schneider (mother)  Lab Contact Info: EDUARDO Gonzalez [phone]; fax [phone]      Patient Findings  Negatives: Signs/symptoms of thrombosis, Signs/symptoms of bleeding, Laboratory test error suspected, Change in health, Change in alcohol use, Change in activity, Upcoming invasive procedure, Emergency department visit, Upcoming dental procedure, Missed doses, Extra doses, Change in medications, Change in diet/appetite, Hospital admission, Bruising, Other complaints   Comments: No longer taking Cephalexin  All findings negative per pt.       Plan:  1. INR is therapeutic today at 2.17 (goal 2.0-3.0). Instructed Mr. Brennan to continue warfarin 10 mg oral daily except warfarin 7.5 mg MonWedFri, until recheck.   2. Repeat INR in in one month on 8/15/2023 resume rechecking monthly if wnl   3. Verbal information provided over the phone. Tito Brennan RBV dosing instructions, expresses understanding by teach back,  and has no further questions at this time.      Kimberly Berry  Pharmacy Technician  8/16/2023 15:30 EDT    I, Andrew Karimi, PharmD, have reviewed the note in full and agree with the assessment and plan.  08/17/23  09:12 EDT

## 2023-09-18 ENCOUNTER — ANTICOAGULATION VISIT (OUTPATIENT)
Dept: PHARMACY | Facility: HOSPITAL | Age: 63
End: 2023-09-18
Payer: COMMERCIAL

## 2023-09-18 LAB — INR PPP: 2.43

## 2023-09-18 NOTE — PROGRESS NOTES
Anticoagulation Clinic - Remote Progress Note  Remote Lab    Indication: paroxysmal afib   Referring Provider: Ursula  Initial Warfarin Start Date: 18-19 years ago (2001)  Goal INR: 2.0-3.0  Current Drug Interactions: pantaprozole (moderate); Fish oil and black cherry pills occassionally  CHADS-VASc: 3 (HF, HTN)    Diet: some GLV (green beans etc-no cooked greens) 6/27/22  Alcohol:  no  Tobacco: no  OTC Pain Medication: occationally tylenol for sinus HA    INR History:  Date 3/2/20 3/10 3/16 3/24 4/1 4/10 4/24 5/15 6/11 6/25 7/9 7/30 8/27   Total WeeklyDose 45 mg / 5 days 70mg 57.5mg 60mg 57.5mg 60mg 60mg 60mg 60mg 62.5mg 62.5mg 62.5mg 62.5mg   INR 1.84 4.02 3.0 3.28 2.38 2.71 2.26 2.03 1.96 2.06 2.46 2.20 2.38   Notes postop  1x hold misdose         recv'd 8/28     Date 9/24 10/22 10/29 12/1 12/2 12/7 12/14 1/5/21 2/4 3/7 4/15 4/27 5/12   Total WeeklyDose 62.5mg 62.5mg 62.5mg 62.5mg 62.5mg 55mg 62.5mg 62.5mg 62.5mg 62.5mg 62.5mg 60mg 62.5mg   INR 2.27 2.23 2.10 6.90 3.97 2.52 2.22 2.94 2.46 2.68 2.50 2.31 2.58   Notes  Bactrim; clinda  ??? Lab error?  1x miss recv'd 12/15  recv'd 2/8    1x miss; recv'd 5/13     Date 5/18 6/2 6/11 6/21 7/6 8/3 8/6 8/9 8/16 8/22 9/2 9/13 9/20   Total WeeklyDose 62.5mg 52.5mg 66.25  mg 62.5 mg 62.5 mg 58.75 mg 65mg 47.5mg 65mg 60mg 60mg 60mg 65mg   INR 2.06 2.01 3.26 2.25 2.79 1.22 6.07 1.23 3.39 2.36 2.08 1.63 1.9   Notes  1x miss 1xboost   3x hold; 1xboost 2xboost 2x hold  recv'd 8/23  3x hold; 1xboost      Date 9/27 10/12 10/25 11/2 11/11 11/22 12/7 12/28 1/25/22 2/8 2/23 3/3   Total WeeklyDose 65mg 62.5mg 62.5mg 55mg 57.5mg 57.5mg 57.5mg 57.5mg 57.5 mg 57.5 mg 57.5 mg 60 mg   INR 2.4 2.33 3.83 2.40 2.60 1.96 2.30 2.21 2.03 2.49 1.65 2.10   Notes                 Date  4/5 4/13 4/18 4/25 5/3 5/10 5/18 6/2 6/8 6/24 7/8 7/22   Total WeeklyDose 67.5 mg 62.5 mg 60 mg 56.25 53.75 mg 55 mg 57.5 mg 57.5 mg 57.5 mg 57.5 mg 57.5 mg 57.5 mg   INR 1.51 2.47 2.85 3.68 3.33 2.36 2.27 2.45  2.40 2.38 1.98 2.09   Notes proced holdx5  Medrol + keflex Medrol + keflex voltaren gel, norco redx2 rec 5/19  Mis-dose rec'd 6/27  allopurinol       Date 8./11 8/19 8/29 9/2 9/16 9/30 10/3 10/17 11/1 11/15 12/5 1/3/23   Total WeeklyDose 57.5 mg 60 mg 62.5 mg 62.5 mg 62.5 mg 62.5 mg 62.5 mg 62.5 mg 62.5 mg 62.5 mg 62.5 mg 62.5 mg   INR 1.86 1.83 2.15 2.29 2.69 2.53 2.3 2.19 2.19 2.39 2.3 2.07   Notes       keflex          Date 1/31  3/1 3/7 3/20 4/5 4/24/23 5/23/23 6/21/23 7/20/23 07/28/23 8/1/23   Total WeeklyDose 62.5 mg 4 day hold 61.25mg 62.5 mg 62.5 mg 62.5 mg 62.5 mg  62.5 mg 62.5 mg 62.5 mg 62.5 mg 62.5 mg    INR 2.47  1.85 2.53 2.69 2.86 2.38 3.0 3.06 2.19 2.28 2.07   Notes          Glucosamine  Cephalexin      Date 08/16 09/18             Total WeeklyDose 62.5 mg 62.5 mg             INR 2.17 2.43             Notes                 * takes warfarin in AM  Phone Interview:  Tablet Strength: 7.5mg and 10mg tablet (12/29/21)  Patient Contact Info: [phone] (mobile, preferred); [phone] (home)  Verbal Release Auth: Charo Schneider (mother)  Lab Contact Info: EDUARDO Gonzalez [phone]; fax [phone]      Patient Findings  Negatives: Signs/symptoms of thrombosis, Signs/symptoms of bleeding, Laboratory test error suspected, Change in health, Change in alcohol use, Change in activity, Upcoming invasive procedure, Emergency department visit, Upcoming dental procedure, Missed doses, Extra doses, Change in medications, Change in diet/appetite, Hospital admission, Bruising, Other complaints   Comments: All findings negative per pt.       Plan:  1. INR is therapeutic today 09/18/23 at 2.43 (goal 2.0-3.0). Instructed Mr. Brennan to continue warfarin 10 mg oral daily except warfarin 7.5 mg MonWedFri, until recheck.   2. Repeat INR in in one month on 10/16/2023 resume rechecking monthly if wnl   3. Verbal information provided over the phone. Tito Brennan RBV dosing instructions, expresses understanding by teach back,  and has no further questions at this time.      Kimberly Berry  Pharmacy Technician  9/18/2023 13:54 EDT    I, Leyla Saba, PharmD, have reviewed the note in full and agree with the assessment and plan.  09/18/23  16:02 EDT

## 2023-10-16 ENCOUNTER — ANTICOAGULATION VISIT (OUTPATIENT)
Dept: PHARMACY | Facility: HOSPITAL | Age: 63
End: 2023-10-16
Payer: COMMERCIAL

## 2023-10-16 LAB — INR PPP: 2.78

## 2023-10-16 NOTE — PROGRESS NOTES
Anticoagulation Clinic - Remote Progress Note  Remote Lab    Indication: paroxysmal afib   Referring Provider: Ursula  Initial Warfarin Start Date: 18-19 years ago (2001)  Goal INR: 2.0-3.0  Current Drug Interactions: pantaprozole (moderate); Fish oil and black cherry pills occassionally  CHADS-VASc: 3 (HF, HTN)    Diet: some GLV (green beans etc-no cooked greens) 6/27/22  Alcohol:  no  Tobacco: no  OTC Pain Medication: occationally tylenol for sinus HA    INR History:  Date 3/2/20 3/10 3/16 3/24 4/1 4/10 4/24 5/15 6/11 6/25 7/9 7/30 8/27   Total WeeklyDose 45 mg / 5 days 70mg 57.5mg 60mg 57.5mg 60mg 60mg 60mg 60mg 62.5mg 62.5mg 62.5mg 62.5mg   INR 1.84 4.02 3.0 3.28 2.38 2.71 2.26 2.03 1.96 2.06 2.46 2.20 2.38   Notes postop  1x hold misdose         recv'd 8/28     Date 9/24 10/22 10/29 12/1 12/2 12/7 12/14 1/5/21 2/4 3/7 4/15 4/27 5/12   Total WeeklyDose 62.5mg 62.5mg 62.5mg 62.5mg 62.5mg 55mg 62.5mg 62.5mg 62.5mg 62.5mg 62.5mg 60mg 62.5mg   INR 2.27 2.23 2.10 6.90 3.97 2.52 2.22 2.94 2.46 2.68 2.50 2.31 2.58   Notes  Bactrim; clinda  ??? Lab error?  1x miss recv'd 12/15  recv'd 2/8    1x miss; recv'd 5/13     Date 5/18 6/2 6/11 6/21 7/6 8/3 8/6 8/9 8/16 8/22 9/2 9/13 9/20   Total WeeklyDose 62.5mg 52.5mg 66.25  mg 62.5 mg 62.5 mg 58.75 mg 65mg 47.5mg 65mg 60mg 60mg 60mg 65mg   INR 2.06 2.01 3.26 2.25 2.79 1.22 6.07 1.23 3.39 2.36 2.08 1.63 1.9   Notes  1x miss 1xboost   3x hold; 1xboost 2xboost 2x hold  recv'd 8/23  3x hold; 1xboost      Date 9/27 10/12 10/25 11/2 11/11 11/22 12/7 12/28 1/25/22 2/8 2/23 3/3   Total WeeklyDose 65mg 62.5mg 62.5mg 55mg 57.5mg 57.5mg 57.5mg 57.5mg 57.5 mg 57.5 mg 57.5 mg 60 mg   INR 2.4 2.33 3.83 2.40 2.60 1.96 2.30 2.21 2.03 2.49 1.65 2.10   Notes                 Date  4/5 4/13 4/18 4/25 5/3 5/10 5/18 6/2 6/8 6/24 7/8 7/22   Total WeeklyDose 67.5 mg 62.5 mg 60 mg 56.25 53.75 mg 55 mg 57.5 mg 57.5 mg 57.5 mg 57.5 mg 57.5 mg 57.5 mg   INR 1.51 2.47 2.85 3.68 3.33 2.36 2.27 2.45  2.40 2.38 1.98 2.09   Notes proced holdx5  Medrol + keflex Medrol + keflex voltaren gel, norco redx2 rec 5/19  Mis-dose rec'd 6/27  allopurinol       Date 8./11 8/19 8/29 9/2 9/16 9/30 10/3 10/17 11/1 11/15 12/5 1/3/23   Total WeeklyDose 57.5 mg 60 mg 62.5 mg 62.5 mg 62.5 mg 62.5 mg 62.5 mg 62.5 mg 62.5 mg 62.5 mg 62.5 mg 62.5 mg   INR 1.86 1.83 2.15 2.29 2.69 2.53 2.3 2.19 2.19 2.39 2.3 2.07   Notes       keflex          Date 1/31  3/1 3/7 3/20 4/5 4/24/23 5/23/23 6/21/23 7/20/23 07/28/23 8/1/23   Total WeeklyDose 62.5 mg 4 day hold 61.25mg 62.5 mg 62.5 mg 62.5 mg 62.5 mg  62.5 mg 62.5 mg 62.5 mg 62.5 mg 62.5 mg    INR 2.47  1.85 2.53 2.69 2.86 2.38 3.0 3.06 2.19 2.28 2.07   Notes          Glucosamine  Cephalexin      Date 08/16 09/18 10/16            Total WeeklyDose 62.5 mg 62.5 mg 62.5 mg            INR 2.17 2.43 2.78            Notes                 * takes warfarin in AM  Phone Interview:  Tablet Strength: 7.5mg and 10mg tablet (12/29/21)  Patient Contact Info: [phone] (mobile, preferred); [phone] (home)  Verbal Release Auth: Charo Schneider (mother)  Lab Contact Info: EDUARDO Gonzalez [phone]; fax [phone]    Patient Findings    Negatives: Signs/symptoms of thrombosis, Signs/symptoms of bleeding, Laboratory test error suspected, Change in health, Change in alcohol use, Change in activity, Upcoming invasive procedure, Emergency department visit, Upcoming dental procedure, Missed doses, Extra doses, Change in medications, Change in diet/appetite, Hospital admission, Bruising, Other complaints         Plan:  1. INR is therapeutic today 10/16/23 at 2.78 (goal 2.0-3.0). Instructed Mr. Brennan to continue warfarin 10 mg oral daily except warfarin 7.5 mg MonWedFri, until recheck.   2. Repeat INR in in one month on 11/13/2023   3. Verbal information provided over the phone. Tito BROWNING Brookschaitanya RBV dosing instructions, expresses understanding by teach back, and has no further questions at this time.    Andrew RODRIGUEZ  Trev PharmD  10/16/2023  13:57 EDT

## 2023-11-06 RX ORDER — SACUBITRIL AND VALSARTAN 49; 51 MG/1; MG/1
1 TABLET, FILM COATED ORAL 2 TIMES DAILY
Qty: 60 TABLET | Refills: 5 | Status: SHIPPED | OUTPATIENT
Start: 2023-11-06

## 2023-11-06 NOTE — TELEPHONE ENCOUNTER
Caller: Manuel Brennanbennie BROWNING    Relationship: Self    Best call back number: [phone]    Requested Prescriptions:   Requested Prescriptions     Pending Prescriptions Disp Refills    sacubitril-valsartan (Entresto) 49-51 MG tablet 60 tablet 5     Sig: Take 1 tablet by mouth 2 (Two) Times a Day.    metoprolol tartrate (LOPRESSOR) 25 MG tablet 60 tablet 11     Sig: Take 1 tablet by mouth As Needed (palpitatons, hr >100).        Pharmacy where request should be sent: [address] [phone] [facility] [phone]      Last office visit with prescribing clinician: 3/16/2022   Last telemedicine visit with prescribing clinician: Visit date not found   Next office visit with prescribing clinician: 4/17/2024     Additional details provided by patient: PT HAS 3 DAYS LEFT OF BOTH MEDICATIONS.     Does the patient have less than a 3 day supply:  [x] Yes  [] No    Would you like a call back once the refill request has been completed: [x] Yes [] No    If the office needs to give you a call back, can they leave a voicemail: [x] Yes [] No    John Mcneill Rep   11/06/23 08:51 EST

## 2023-11-13 ENCOUNTER — ANTICOAGULATION VISIT (OUTPATIENT)
Dept: PHARMACY | Facility: HOSPITAL | Age: 63
End: 2023-11-13
Payer: COMMERCIAL

## 2023-11-13 LAB — INR PPP: 2.54

## 2023-11-13 NOTE — PROGRESS NOTES
Anticoagulation Clinic - Remote Progress Note  Remote Lab    Indication: paroxysmal afib   Referring Provider: Ursula  Initial Warfarin Start Date: 18-19 years ago (2001)  Goal INR: 2.0-3.0  Current Drug Interactions: pantaprozole (moderate); Fish oil and black cherry pills occassionally  CHADS-VASc: 3 (HF, HTN)    Diet: some GLV (green beans etc-no cooked greens) 6/27/22  Alcohol:  no  Tobacco: no  OTC Pain Medication: occationally tylenol for sinus HA    INR History:  Date 3/2/20 3/10 3/16 3/24 4/1 4/10 4/24 5/15 6/11 6/25 7/9 7/30 8/27   Total WeeklyDose 45 mg / 5 days 70mg 57.5mg 60mg 57.5mg 60mg 60mg 60mg 60mg 62.5mg 62.5mg 62.5mg 62.5mg   INR 1.84 4.02 3.0 3.28 2.38 2.71 2.26 2.03 1.96 2.06 2.46 2.20 2.38   Notes postop  1x hold misdose         recv'd 8/28     Date 9/24 10/22 10/29 12/1 12/2 12/7 12/14 1/5/21 2/4 3/7 4/15 4/27 5/12   Total WeeklyDose 62.5mg 62.5mg 62.5mg 62.5mg 62.5mg 55mg 62.5mg 62.5mg 62.5mg 62.5mg 62.5mg 60mg 62.5mg   INR 2.27 2.23 2.10 6.90 3.97 2.52 2.22 2.94 2.46 2.68 2.50 2.31 2.58   Notes  Bactrim; clinda  ??? Lab error?  1x miss recv'd 12/15  recv'd 2/8    1x miss; recv'd 5/13     Date 5/18 6/2 6/11 6/21 7/6 8/3 8/6 8/9 8/16 8/22 9/2 9/13 9/20   Total WeeklyDose 62.5mg 52.5mg 66.25  mg 62.5 mg 62.5 mg 58.75 mg 65mg 47.5mg 65mg 60mg 60mg 60mg 65mg   INR 2.06 2.01 3.26 2.25 2.79 1.22 6.07 1.23 3.39 2.36 2.08 1.63 1.9   Notes  1x miss 1xboost   3x hold; 1xboost 2xboost 2x hold  recv'd 8/23  3x hold; 1xboost      Date 9/27 10/12 10/25 11/2 11/11 11/22 12/7 12/28 1/25/22 2/8 2/23 3/3   Total WeeklyDose 65mg 62.5mg 62.5mg 55mg 57.5mg 57.5mg 57.5mg 57.5mg 57.5 mg 57.5 mg 57.5 mg 60 mg   INR 2.4 2.33 3.83 2.40 2.60 1.96 2.30 2.21 2.03 2.49 1.65 2.10   Notes                 Date  4/5 4/13 4/18 4/25 5/3 5/10 5/18 6/2 6/8 6/24 7/8 7/22   Total WeeklyDose 67.5 mg 62.5 mg 60 mg 56.25 53.75 mg 55 mg 57.5 mg 57.5 mg 57.5 mg 57.5 mg 57.5 mg 57.5 mg   INR 1.51 2.47 2.85 3.68 3.33 2.36 2.27 2.45  2.40 2.38 1.98 2.09   Notes proced holdx5  Medrol + keflex Medrol + keflex voltaren gel, norco redx2 rec 5/19  Mis-dose rec'd 6/27  allopurinol       Date 8./11 8/19 8/29 9/2 9/16 9/30 10/3 10/17 11/1 11/15 12/5 1/3/23   Total WeeklyDose 57.5 mg 60 mg 62.5 mg 62.5 mg 62.5 mg 62.5 mg 62.5 mg 62.5 mg 62.5 mg 62.5 mg 62.5 mg 62.5 mg   INR 1.86 1.83 2.15 2.29 2.69 2.53 2.3 2.19 2.19 2.39 2.3 2.07   Notes       keflex          Date 1/31  3/1 3/7 3/20 4/5 4/24/23 5/23/23 6/21/23 7/20/23 07/28/23 8/1/23   Total WeeklyDose 62.5 mg 4 day hold 61.25mg 62.5 mg 62.5 mg 62.5 mg 62.5 mg  62.5 mg 62.5 mg 62.5 mg 62.5 mg 62.5 mg    INR 2.47  1.85 2.53 2.69 2.86 2.38 3.0 3.06 2.19 2.28 2.07   Notes          Glucosamine  Cephalexin      Date 08/16 09/18 10/16 11/13           Total WeeklyDose 62.5 mg 62.5 mg 62.5 mg 62.5 mg           INR 2.17 2.43 2.78 2.54           Notes                 * takes warfarin in AM  Phone Interview:  Tablet Strength: 7.5mg and 10mg tablet (12/29/21)  Patient Contact Info: [phone] (mobile, preferred); [phone] (home)  Verbal Release Auth: Charo Schneider (mother)  Lab Contact Info: EDUARDO Gonzalez [phone]; fax [phone]    Patient Findings    Negatives: Signs/symptoms of thrombosis, Signs/symptoms of bleeding, Laboratory test error suspected, Change in health, Change in alcohol use, Change in activity, Upcoming invasive procedure, Emergency department visit, Upcoming dental procedure, Missed doses, Extra doses, Change in medications, Change in diet/appetite, Hospital admission, Bruising, Other complaints       Plan:  1. INR is therapeutic today 11/13/23 at 2.54 (goal 2.0-3.0). Instructed Mr. Brennan to continue warfarin 10 mg oral daily except warfarin 7.5 mg MonWedFri, until recheck.   2. Repeat INR in in one month on 12/11/2023   3. Verbal information provided over the phone. Tito Brennan RBV dosing instructions, expresses understanding by teach back, and has no further questions at this  time.    Javi Couch  Pharmacy Intern  11/13/2023 13:29 EST      I, Ira Veliz, [facility], have reviewed the note in full and agree with the assessment and plan.  11/14/23  09:33 EST

## 2023-12-11 RX ORDER — METOPROLOL SUCCINATE 100 MG/1
100 TABLET, EXTENDED RELEASE ORAL 2 TIMES DAILY
Qty: 60 TABLET | Refills: 5 | Status: SHIPPED | OUTPATIENT
Start: 2023-12-11

## 2023-12-14 ENCOUNTER — ANTICOAGULATION VISIT (OUTPATIENT)
Dept: PHARMACY | Facility: HOSPITAL | Age: 63
End: 2023-12-14
Payer: COMMERCIAL

## 2023-12-14 LAB — INR PPP: 2.21

## 2023-12-14 NOTE — PROGRESS NOTES
Anticoagulation Clinic - Remote Progress Note  Remote Lab    Indication: paroxysmal afib   Referring Provider: Ursula  Initial Warfarin Start Date: 18-19 years ago (2001)  Goal INR: 2.0-3.0  Current Drug Interactions: pantaprozole (moderate); Fish oil and black cherry pills occassionally  CHADS-VASc: 3 (HF, HTN)    Diet: some GLV (green beans etc-no cooked greens) 6/27/22  Alcohol:  no  Tobacco: no  OTC Pain Medication: occationally tylenol for sinus HA    INR History:  Date 3/2/20 3/10 3/16 3/24 4/1 4/10 4/24 5/15 6/11 6/25 7/9 7/30 8/27   Total WeeklyDose 45 mg / 5 days 70mg 57.5mg 60mg 57.5mg 60mg 60mg 60mg 60mg 62.5mg 62.5mg 62.5mg 62.5mg   INR 1.84 4.02 3.0 3.28 2.38 2.71 2.26 2.03 1.96 2.06 2.46 2.20 2.38   Notes postop  1x hold misdose         recv'd 8/28     Date 9/24 10/22 10/29 12/1 12/2 12/7 12/14 1/5/21 2/4 3/7 4/15 4/27 5/12   Total WeeklyDose 62.5mg 62.5mg 62.5mg 62.5mg 62.5mg 55mg 62.5mg 62.5mg 62.5mg 62.5mg 62.5mg 60mg 62.5mg   INR 2.27 2.23 2.10 6.90 3.97 2.52 2.22 2.94 2.46 2.68 2.50 2.31 2.58   Notes  Bactrim; clinda  ??? Lab error?  1x miss recv'd 12/15  recv'd 2/8    1x miss; recv'd 5/13     Date 5/18 6/2 6/11 6/21 7/6 8/3 8/6 8/9 8/16 8/22 9/2 9/13 9/20   Total WeeklyDose 62.5mg 52.5mg 66.25  mg 62.5 mg 62.5 mg 58.75 mg 65mg 47.5mg 65mg 60mg 60mg 60mg 65mg   INR 2.06 2.01 3.26 2.25 2.79 1.22 6.07 1.23 3.39 2.36 2.08 1.63 1.9   Notes  1x miss 1xboost   3x hold; 1xboost 2xboost 2x hold  recv'd 8/23  3x hold; 1xboost      Date 9/27 10/12 10/25 11/2 11/11 11/22 12/7 12/28 1/25/22 2/8 2/23 3/3   Total WeeklyDose 65mg 62.5mg 62.5mg 55mg 57.5mg 57.5mg 57.5mg 57.5mg 57.5 mg 57.5 mg 57.5 mg 60 mg   INR 2.4 2.33 3.83 2.40 2.60 1.96 2.30 2.21 2.03 2.49 1.65 2.10   Notes                 Date  4/5 4/13 4/18 4/25 5/3 5/10 5/18 6/2 6/8 6/24 7/8 7/22   Total WeeklyDose 67.5 mg 62.5 mg 60 mg 56.25 53.75 mg 55 mg 57.5 mg 57.5 mg 57.5 mg 57.5 mg 57.5 mg 57.5 mg   INR 1.51 2.47 2.85 3.68 3.33 2.36 2.27 2.45  2.40 2.38 1.98 2.09   Notes proced holdx5  Medrol + keflex Medrol + keflex voltaren gel, norco redx2 rec 5/19  Mis-dose rec'd 6/27  allopurinol       Date 8./11 8/19 8/29 9/2 9/16 9/30 10/3 10/17 11/1 11/15 12/5 1/3/23   Total WeeklyDose 57.5 mg 60 mg 62.5 mg 62.5 mg 62.5 mg 62.5 mg 62.5 mg 62.5 mg 62.5 mg 62.5 mg 62.5 mg 62.5 mg   INR 1.86 1.83 2.15 2.29 2.69 2.53 2.3 2.19 2.19 2.39 2.3 2.07   Notes       keflex          Date 1/31  3/1 3/7 3/20 4/5 4/24/23 5/23/23 6/21/23 7/20/23 07/28/23 8/1/23   Total WeeklyDose 62.5 mg 4 day hold 61.25mg 62.5 mg 62.5 mg 62.5 mg 62.5 mg  62.5 mg 62.5 mg 62.5 mg 62.5 mg 62.5 mg    INR 2.47  1.85 2.53 2.69 2.86 2.38 3.0 3.06 2.19 2.28 2.07   Notes          Glucosamine  Cephalexin      Date 08/16 09/18 10/16 11/13 12/14          Total WeeklyDose 62.5 mg 62.5 mg 62.5 mg 62.5 mg 62.5 mg          INR 2.17 2.43 2.78 2.54 2.21          Notes                 * takes warfarin in AM  Phone Interview:  Tablet Strength: 7.5mg and 10mg tablet (12/29/21)  Patient Contact Info: [phone] (mobile, preferred); [phone] (home)  Verbal Release Auth: Charo Schneider (mother)  Lab Contact Info: EDUARDO Gonzalez [phone]; fax [phone]    Patient Findings  Negatives: Signs/symptoms of thrombosis, Signs/symptoms of bleeding, Laboratory test error suspected, Change in health, Change in alcohol use, Change in activity, Upcoming invasive procedure, Emergency department visit, Upcoming dental procedure, Missed doses, Extra doses, Change in medications, Change in diet/appetite, Hospital admission, Bruising, Other complaints   Comments: All findings negative per pt.     Plan:  1. INR is therapeutic today 12/14/23 at 2.21 (goal 2.0-3.0). Instructed Mr. Brennan to continue warfarin 10 mg oral daily except warfarin 7.5 mg MonWedFri, until recheck.   2. Repeat INR in in one month on 1/11/2024   3. Verbal information provided over the phone. Tito Brennan RBV dosing instructions, expresses understanding by  teach back, and has no further questions at this time.    Maggie Arnold,  Certified Pharmacy Technician  12/14/2023 13:32 EST    I, Leyla Saba, PharmD, have reviewed the note in full and agree with the assessment and plan.  12/14/23  14:00 EST

## 2024-01-16 ENCOUNTER — TELEPHONE (OUTPATIENT)
Dept: PHARMACY | Facility: HOSPITAL | Age: 64
End: 2024-01-16
Payer: COMMERCIAL

## 2024-01-16 NOTE — TELEPHONE ENCOUNTER
Dr. Vergara,     We were informed today that  Tito Brennan will be undergoing a procedure on his hand on 2/21/24 with Dr. Alfred at [facility].  Mr. Brennan stated this is the same procedure that he had last February.  Dr. Alfred requested that the patient hold warfarin for 4 days prior to procedure and resume warfarin night after procedure.     Mr. Brennan is on warfarin for paroxysmal afib, therefore bridge therapy is not recommended.      2/17/24: Begin 4 day warfarin hold  2/21/24: procedure   2/22/23: Resume warfarin (2-3 boosted doses, if appropriate)  2/26/23: INR recheck      Please advise if you are agreeable to plan above or if you prefer an alternative approach to Mr. Brennan's anticoagulation plan for the upcoming procedure. In addition, please advise if surgeon's office should contact your office for further cardiac clearance.     Bing Flynn, PharmD  1/16/2024  15:56 EST    [facility]

## 2024-01-16 NOTE — TELEPHONE ENCOUNTER
Contacted Erendira Anu regarding overdue PT/INR.  He will go tomorrow to have it checked due to weather conditions today.    Bing Flynn, PharmD  1/16/2024  15:51 EST

## 2024-01-17 ENCOUNTER — ANTICOAGULATION VISIT (OUTPATIENT)
Dept: PHARMACY | Facility: HOSPITAL | Age: 64
End: 2024-01-17
Payer: COMMERCIAL

## 2024-01-17 LAB — INR PPP: 2.11

## 2024-01-17 NOTE — PROGRESS NOTES
Anticoagulation Clinic - Remote Progress Note  Remote Lab    Indication: paroxysmal afib   Referring Provider: Ursula  Initial Warfarin Start Date: 18-19 years ago (2001)  Goal INR: 2.0-3.0  Current Drug Interactions: pantaprozole (moderate); Fish oil and black cherry pills occassionally  CHADS-VASc: 3 (HF, HTN)    Diet: some GLV (green beans etc-no cooked greens) 6/27/22  Alcohol:  no  Tobacco: no  OTC Pain Medication: occationally tylenol for sinus HA    INR History:  Date 3/2/20 3/10 3/16 3/24 4/1 4/10 4/24 5/15 6/11 6/25 7/9 7/30 8/27   Total WeeklyDose 45 mg / 5 days 70mg 57.5mg 60mg 57.5mg 60mg 60mg 60mg 60mg 62.5mg 62.5mg 62.5mg 62.5mg   INR 1.84 4.02 3.0 3.28 2.38 2.71 2.26 2.03 1.96 2.06 2.46 2.20 2.38   Notes postop  1x hold misdose         recv'd 8/28     Date 9/24 10/22 10/29 12/1 12/2 12/7 12/14 1/5/21 2/4 3/7 4/15 4/27 5/12   Total WeeklyDose 62.5mg 62.5mg 62.5mg 62.5mg 62.5mg 55mg 62.5mg 62.5mg 62.5mg 62.5mg 62.5mg 60mg 62.5mg   INR 2.27 2.23 2.10 6.90 3.97 2.52 2.22 2.94 2.46 2.68 2.50 2.31 2.58   Notes  Bactrim; clinda  ??? Lab error?  1x miss recv'd 12/15  recv'd 2/8    1x miss; recv'd 5/13     Date 5/18 6/2 6/11 6/21 7/6 8/3 8/6 8/9 8/16 8/22 9/2 9/13 9/20   Total WeeklyDose 62.5mg 52.5mg 66.25  mg 62.5 mg 62.5 mg 58.75 mg 65mg 47.5mg 65mg 60mg 60mg 60mg 65mg   INR 2.06 2.01 3.26 2.25 2.79 1.22 6.07 1.23 3.39 2.36 2.08 1.63 1.9   Notes  1x miss 1xboost   3x hold; 1xboost 2xboost 2x hold  recv'd 8/23  3x hold; 1xboost      Date 9/27 10/12 10/25 11/2 11/11 11/22 12/7 12/28 1/25/22 2/8 2/23 3/3   Total WeeklyDose 65mg 62.5mg 62.5mg 55mg 57.5mg 57.5mg 57.5mg 57.5mg 57.5 mg 57.5 mg 57.5 mg 60 mg   INR 2.4 2.33 3.83 2.40 2.60 1.96 2.30 2.21 2.03 2.49 1.65 2.10   Notes                 Date  4/5 4/13 4/18 4/25 5/3 5/10 5/18 6/2 6/8 6/24 7/8 7/22   Total WeeklyDose 67.5 mg 62.5 mg 60 mg 56.25 53.75 mg 55 mg 57.5 mg 57.5 mg 57.5 mg 57.5 mg 57.5 mg 57.5 mg   INR 1.51 2.47 2.85 3.68 3.33 2.36 2.27 2.45  2.40 2.38 1.98 2.09   Notes proced holdx5  Medrol + keflex Medrol + keflex voltaren gel, norco redx2 rec 5/19  Mis-dose rec'd 6/27  allopurinol       Date 8./11 8/19 8/29 9/2 9/16 9/30 10/3 10/17 11/1 11/15 12/5 1/3/23   Total WeeklyDose 57.5 mg 60 mg 62.5 mg 62.5 mg 62.5 mg 62.5 mg 62.5 mg 62.5 mg 62.5 mg 62.5 mg 62.5 mg 62.5 mg   INR 1.86 1.83 2.15 2.29 2.69 2.53 2.3 2.19 2.19 2.39 2.3 2.07   Notes       keflex          Date 1/31  3/1 3/7 3/20 4/5 4/24/23 5/23/23 6/21/23 7/20/23 07/28/23 8/1/23   Total WeeklyDose 62.5 mg 4 day hold 61.25mg 62.5 mg 62.5 mg 62.5 mg 62.5 mg  62.5 mg 62.5 mg 62.5 mg 62.5 mg 62.5 mg    INR 2.47  1.85 2.53 2.69 2.86 2.38 3.0 3.06 2.19 2.28 2.07   Notes          Glucosamine  Cephalexin      Date 08/16 09/18 10/16 11/13 12/14 1/17/24         Total WeeklyDose 62.5 mg 62.5 mg 62.5 mg 62.5 mg 62.5 mg 62.5 mg         INR 2.17 2.43 2.78 2.54 2.21 2.11         Notes                 * takes warfarin in AM  Phone Interview:  Tablet Strength: 7.5mg and 10mg tablet (12/29/21)  Patient Contact Info: [phone] (mobile, preferred); [phone] (home)  Verbal Release Auth: Charo Schneider (mother)  Lab Contact Info: EDUARDO Gonzalez [phone]; fax [phone]      Patient Findings  Positives: Upcoming invasive procedure, Change in medications   Negatives: Signs/symptoms of thrombosis, Signs/symptoms of bleeding, Laboratory test error suspected, Change in health, Change in alcohol use, Change in activity, Emergency department visit, Upcoming dental procedure, Missed doses, Extra doses, Change in diet/appetite, Hospital admission, Bruising, Other complaints   Comments: Azithromycin pack prescribed for cough, pt. Started today    Perioperative plan approved for upcoming procedure on 2/21/24    All other findings negative per patient       Plan:  1. INR is therapeutic today 1/17/24 at 2.11 (goal 2.0-3.0). Instructed Mr. Brennan to continue warfarin 10 mg oral daily except warfarin 7.5 mg MonWed&Fri, until  recheck.   2. Repeat INR next Monday 1/22/24 due to abx ddi   3. Verbal information provided over the phone. Tito Brennan RBV dosing instructions, expresses understanding by teach back, and has no further questions at this time.    Naseem Schmidt, Pharmacy Technician  1/17/2024  15:47 Aspen ROWAN, [facility], have reviewed the note in full and agree with the assessment and plan.  01/17/24  16:15 EST

## 2024-01-22 ENCOUNTER — ANTICOAGULATION VISIT (OUTPATIENT)
Dept: PHARMACY | Facility: HOSPITAL | Age: 64
End: 2024-01-22
Payer: COMMERCIAL

## 2024-01-22 LAB — INR PPP: 2.64

## 2024-01-22 NOTE — PROGRESS NOTES
Anticoagulation Clinic - Remote Progress Note  Remote Lab    Indication: paroxysmal afib   Referring Provider: Ursula  Initial Warfarin Start Date: 18-19 years ago (2001)  Goal INR: 2.0-3.0  Current Drug Interactions: pantaprozole (moderate); Fish oil and black cherry pills occassionally  CHADS-VASc: 3 (HF, HTN)    Diet: some GLV (green beans etc-no cooked greens) 6/27/22  Alcohol:  no  Tobacco: no  OTC Pain Medication: occationally tylenol for sinus HA    INR History:  Date 3/2/20 3/10 3/16 3/24 4/1 4/10 4/24 5/15 6/11 6/25 7/9 7/30 8/27   Total WeeklyDose 45 mg / 5 days 70mg 57.5mg 60mg 57.5mg 60mg 60mg 60mg 60mg 62.5mg 62.5mg 62.5mg 62.5mg   INR 1.84 4.02 3.0 3.28 2.38 2.71 2.26 2.03 1.96 2.06 2.46 2.20 2.38   Notes postop  1x hold misdose         recv'd 8/28     Date 9/24 10/22 10/29 12/1 12/2 12/7 12/14 1/5/21 2/4 3/7 4/15 4/27 5/12   Total WeeklyDose 62.5mg 62.5mg 62.5mg 62.5mg 62.5mg 55mg 62.5mg 62.5mg 62.5mg 62.5mg 62.5mg 60mg 62.5mg   INR 2.27 2.23 2.10 6.90 3.97 2.52 2.22 2.94 2.46 2.68 2.50 2.31 2.58   Notes  Bactrim; clinda  ??? Lab error?  1x miss recv'd 12/15  recv'd 2/8    1x miss; recv'd 5/13     Date 5/18 6/2 6/11 6/21 7/6 8/3 8/6 8/9 8/16 8/22 9/2 9/13 9/20   Total WeeklyDose 62.5mg 52.5mg 66.25  mg 62.5 mg 62.5 mg 58.75 mg 65mg 47.5mg 65mg 60mg 60mg 60mg 65mg   INR 2.06 2.01 3.26 2.25 2.79 1.22 6.07 1.23 3.39 2.36 2.08 1.63 1.9   Notes  1x miss 1xboost   3x hold; 1xboost 2xboost 2x hold  recv'd 8/23  3x hold; 1xboost      Date 9/27 10/12 10/25 11/2 11/11 11/22 12/7 12/28 1/25/22 2/8 2/23 3/3   Total WeeklyDose 65mg 62.5mg 62.5mg 55mg 57.5mg 57.5mg 57.5mg 57.5mg 57.5 mg 57.5 mg 57.5 mg 60 mg   INR 2.4 2.33 3.83 2.40 2.60 1.96 2.30 2.21 2.03 2.49 1.65 2.10   Notes                 Date  4/5 4/13 4/18 4/25 5/3 5/10 5/18 6/2 6/8 6/24 7/8 7/22   Total WeeklyDose 67.5 mg 62.5 mg 60 mg 56.25 53.75 mg 55 mg 57.5 mg 57.5 mg 57.5 mg 57.5 mg 57.5 mg 57.5 mg   INR 1.51 2.47 2.85 3.68 3.33 2.36 2.27 2.45  2.40 2.38 1.98 2.09   Notes proced holdx5  Medrol + keflex Medrol + keflex voltaren gel, norco redx2 rec 5/19  Mis-dose rec'd 6/27  allopurinol       Date 8./11 8/19 8/29 9/2 9/16 9/30 10/3 10/17 11/1 11/15 12/5 1/3/23   Total WeeklyDose 57.5 mg 60 mg 62.5 mg 62.5 mg 62.5 mg 62.5 mg 62.5 mg 62.5 mg 62.5 mg 62.5 mg 62.5 mg 62.5 mg   INR 1.86 1.83 2.15 2.29 2.69 2.53 2.3 2.19 2.19 2.39 2.3 2.07   Notes       keflex          Date 1/31  3/1 3/7 3/20 4/5 4/24/23 5/23/23 6/21/23 7/20/23 07/28/23 8/1/23   Total WeeklyDose 62.5 mg 4 day hold 61.25mg 62.5 mg 62.5 mg 62.5 mg 62.5 mg  62.5 mg 62.5 mg 62.5 mg 62.5 mg 62.5 mg    INR 2.47  1.85 2.53 2.69 2.86 2.38 3.0 3.06 2.19 2.28 2.07   Notes          Glucosamine  Cephalexin      Date 08/16 09/18 10/16 11/13 12/14 1/17/24 1/22        Total WeeklyDose 62.5 mg 62.5 mg 62.5 mg 62.5 mg 62.5 mg 62.5 mg 62.5 mg        INR 2.17 2.43 2.78 2.54 2.21 2.11 2.64        Notes                 * takes warfarin in AM  Phone Interview:  Tablet Strength: 7.5mg and 10mg tablet (12/29/21)  Patient Contact Info: [phone] (mobile, preferred); [phone] (home)  Verbal Release Auth: Charo Schneider (mother)  Lab Contact Info: EDUARDO Gonzalez [phone]; fax [phone]    Patient Findings    Positives: Upcoming invasive procedure, Change in medications   Negatives: Signs/symptoms of thrombosis, Signs/symptoms of bleeding, Laboratory test error suspected, Change in health, Change in alcohol use, Change in activity, Emergency department visit, Upcoming dental procedure, Missed doses, Extra doses, Change in diet/appetite, Hospital admission, Bruising, Other complaints   Comments: Patient states he completed course of azithromycin and was given Phenazopyridine due to urinary pain/discomfort. Informed patient that Azo doesn't interact with warfarin.    Perioperative plan approved for upcoming procedure on 2/21/24    All other findings negative per patient.       Plan:  1. INR is therapeutic today  1/22/24 at 2.64 (goal 2.0-3.0). Instructed Mr. Brennan to continue warfarin 10 mg oral daily except warfarin 7.5 mg MonWed&Fri, until recheck.   2. Repeat INR on 1/29  3. Verbal information provided over the phone. Tito Brennan RBV dosing instructions, expresses understanding by teach back, and has no further questions at this time.    Marvin Baxter, PharmD  Pharmacy Resident  1/22/2024  13:47 EST

## 2024-01-23 ENCOUNTER — TELEPHONE (OUTPATIENT)
Dept: PHARMACY | Facility: HOSPITAL | Age: 64
End: 2024-01-23

## 2024-01-23 NOTE — TELEPHONE ENCOUNTER
Pt. Called report he will begin taking levofloxacin 500 mg once daily for seven days, and Pyridium 200 mg three times daily for two days. Per Jordana Desir, PharmD instructed pt. To recheck his INR this Friday     Naseem Schmidt, Pharmacy Technician  1/23/2024  12:08 EST

## 2024-01-29 ENCOUNTER — ANTICOAGULATION VISIT (OUTPATIENT)
Dept: PHARMACY | Facility: HOSPITAL | Age: 64
End: 2024-01-29
Payer: COMMERCIAL

## 2024-01-29 LAB — INR PPP: 2.67

## 2024-01-29 NOTE — PROGRESS NOTES
Anticoagulation Clinic - Remote Progress Note  Remote Lab    Indication: paroxysmal afib   Referring Provider: Ursula  Initial Warfarin Start Date: 18-19 years ago (2001)  Goal INR: 2.0-3.0  Current Drug Interactions: pantaprozole (moderate); Fish oil and black cherry pills occassionally  CHADS-VASc: 3 (HF, HTN)    Diet: some GLV (green beans etc-no cooked greens) 6/27/22  Alcohol:  no  Tobacco: no  OTC Pain Medication: occationally tylenol for sinus HA    INR History:  Date 3/2/20 3/10 3/16 3/24 4/1 4/10 4/24 5/15 6/11 6/25 7/9 7/30 8/27   Total WeeklyDose 45 mg / 5 days 70mg 57.5mg 60mg 57.5mg 60mg 60mg 60mg 60mg 62.5mg 62.5mg 62.5mg 62.5mg   INR 1.84 4.02 3.0 3.28 2.38 2.71 2.26 2.03 1.96 2.06 2.46 2.20 2.38   Notes postop  1x hold misdose         recv'd 8/28     Date 9/24 10/22 10/29 12/1 12/2 12/7 12/14 1/5/21 2/4 3/7 4/15 4/27 5/12   Total WeeklyDose 62.5mg 62.5mg 62.5mg 62.5mg 62.5mg 55mg 62.5mg 62.5mg 62.5mg 62.5mg 62.5mg 60mg 62.5mg   INR 2.27 2.23 2.10 6.90 3.97 2.52 2.22 2.94 2.46 2.68 2.50 2.31 2.58   Notes  Bactrim; clinda  ??? Lab error?  1x miss recv'd 12/15  recv'd 2/8    1x miss; recv'd 5/13     Date 5/18 6/2 6/11 6/21 7/6 8/3 8/6 8/9 8/16 8/22 9/2 9/13 9/20   Total WeeklyDose 62.5mg 52.5mg 66.25  mg 62.5 mg 62.5 mg 58.75 mg 65mg 47.5mg 65mg 60mg 60mg 60mg 65mg   INR 2.06 2.01 3.26 2.25 2.79 1.22 6.07 1.23 3.39 2.36 2.08 1.63 1.9   Notes  1x miss 1xboost   3x hold; 1xboost 2xboost 2x hold  recv'd 8/23  3x hold; 1xboost      Date 9/27 10/12 10/25 11/2 11/11 11/22 12/7 12/28 1/25/22 2/8 2/23 3/3   Total WeeklyDose 65mg 62.5mg 62.5mg 55mg 57.5mg 57.5mg 57.5mg 57.5mg 57.5 mg 57.5 mg 57.5 mg 60 mg   INR 2.4 2.33 3.83 2.40 2.60 1.96 2.30 2.21 2.03 2.49 1.65 2.10   Notes                 Date  4/5 4/13 4/18 4/25 5/3 5/10 5/18 6/2 6/8 6/24 7/8 7/22   Total WeeklyDose 67.5 mg 62.5 mg 60 mg 56.25 53.75 mg 55 mg 57.5 mg 57.5 mg 57.5 mg 57.5 mg 57.5 mg 57.5 mg   INR 1.51 2.47 2.85 3.68 3.33 2.36 2.27 2.45  2.40 2.38 1.98 2.09   Notes proced holdx5  Medrol + keflex Medrol + keflex voltaren gel, norco redx2 rec 5/19  Mis-dose rec'd 6/27  allopurinol       Date 8./11 8/19 8/29 9/2 9/16 9/30 10/3 10/17 11/1 11/15 12/5 1/3/23   Total WeeklyDose 57.5 mg 60 mg 62.5 mg 62.5 mg 62.5 mg 62.5 mg 62.5 mg 62.5 mg 62.5 mg 62.5 mg 62.5 mg 62.5 mg   INR 1.86 1.83 2.15 2.29 2.69 2.53 2.3 2.19 2.19 2.39 2.3 2.07   Notes       keflex          Date 1/31  3/1 3/7 3/20 4/5 4/24/23 5/23/23 6/21/23 7/20/23 07/28/23 8/1/23   Total WeeklyDose 62.5 mg 4 day hold 61.25mg 62.5 mg 62.5 mg 62.5 mg 62.5 mg  62.5 mg 62.5 mg 62.5 mg 62.5 mg 62.5 mg    INR 2.47  1.85 2.53 2.69 2.86 2.38 3.0 3.06 2.19 2.28 2.07   Notes          Glucosamine  Cephalexin      Date 08/16 09/18 10/16 11/13 12/14 1/17/24 1/22 1/29       Total WeeklyDose 62.5 mg 62.5 mg 62.5 mg 62.5 mg 62.5 mg 62.5 mg 62.5 mg 62.5 mg        INR 2.17 2.43 2.78 2.54 2.21 2.11 2.64 2.67       Notes                 * takes warfarin in AM  Phone Interview:  Tablet Strength: 7.5mg and 10mg tablet (12/29/21)  Patient Contact Info: [phone] (mobile, preferred); [phone] (home)  Verbal Release Auth: Charo Schneider (mother)  Lab Contact Info: EDUARDO Gonzalez [phone]; fax [phone]    Patient Findings    Positives: Upcoming invasive procedure, Change in medications   Negatives: Signs/symptoms of thrombosis, Signs/symptoms of bleeding, Laboratory test error suspected, Change in health, Change in alcohol use, Change in activity, Emergency department visit, Upcoming dental procedure, Missed doses, Extra doses, Change in diet/appetite, Hospital admission, Bruising, Other complaints   Comments: Perioperative plan approved for upcoming procedure on 2/21/24. Patient completed antibiotic today.  All other findings negative per patient.         Plan:  1. INR is therapeutic today 1/29/24 at 2.67 (goal 2.0-3.0). Instructed Mr. Brennan to continue warfarin 10 mg oral daily except warfarin 7.5 mg MonWed&Fri,  until recheck.   2. We  discuss his perioperative plan as below:     2/17/24: Begin 4 day warfarin hold  2/21/24: procedure ; no warfarin evening of procedure.   2/22/24: Resume warfarin 10 mg  2/23/24: warfarin 10 mg boosted dose  2/24/24: warfarin 10 mg  2/25/24: warfarin 10 mg  2/26/23: INR recheck    3. Repeat INR on 2/26/24  3. Verbal information provided over the phone. Tito Brennan RBV dosing instructions, expresses understanding by teach back, and has no further questions at this time.        Andrew Karimi, PharmD  1/29/2024  15:15 EST

## 2024-01-29 NOTE — PROGRESS NOTES
Anticoagulation Clinic - Remote Progress Note  Remote Lab    Indication: paroxysmal afib   Referring Provider: Ursula  Initial Warfarin Start Date: 18-19 years ago (2001)  Goal INR: 2.0-3.0  Current Drug Interactions: pantaprozole (moderate); Fish oil and black cherry pills occassionally  CHADS-VASc: 3 (HF, HTN)    Diet: some GLV (green beans etc-no cooked greens) 6/27/22  Alcohol:  no  Tobacco: no  OTC Pain Medication: occationally tylenol for sinus HA    INR History:  Date 3/2/20 3/10 3/16 3/24 4/1 4/10 4/24 5/15 6/11 6/25 7/9 7/30 8/27   Total WeeklyDose 45 mg / 5 days 70mg 57.5mg 60mg 57.5mg 60mg 60mg 60mg 60mg 62.5mg 62.5mg 62.5mg 62.5mg   INR 1.84 4.02 3.0 3.28 2.38 2.71 2.26 2.03 1.96 2.06 2.46 2.20 2.38   Notes postop  1x hold misdose         recv'd 8/28     Date 9/24 10/22 10/29 12/1 12/2 12/7 12/14 1/5/21 2/4 3/7 4/15 4/27 5/12   Total WeeklyDose 62.5mg 62.5mg 62.5mg 62.5mg 62.5mg 55mg 62.5mg 62.5mg 62.5mg 62.5mg 62.5mg 60mg 62.5mg   INR 2.27 2.23 2.10 6.90 3.97 2.52 2.22 2.94 2.46 2.68 2.50 2.31 2.58   Notes  Bactrim; clinda  ??? Lab error?  1x miss recv'd 12/15  recv'd 2/8    1x miss; recv'd 5/13     Date 5/18 6/2 6/11 6/21 7/6 8/3 8/6 8/9 8/16 8/22 9/2 9/13 9/20   Total WeeklyDose 62.5mg 52.5mg 66.25  mg 62.5 mg 62.5 mg 58.75 mg 65mg 47.5mg 65mg 60mg 60mg 60mg 65mg   INR 2.06 2.01 3.26 2.25 2.79 1.22 6.07 1.23 3.39 2.36 2.08 1.63 1.9   Notes  1x miss 1xboost   3x hold; 1xboost 2xboost 2x hold  recv'd 8/23  3x hold; 1xboost      Date 9/27 10/12 10/25 11/2 11/11 11/22 12/7 12/28 1/25/22 2/8 2/23 3/3   Total WeeklyDose 65mg 62.5mg 62.5mg 55mg 57.5mg 57.5mg 57.5mg 57.5mg 57.5 mg 57.5 mg 57.5 mg 60 mg   INR 2.4 2.33 3.83 2.40 2.60 1.96 2.30 2.21 2.03 2.49 1.65 2.10   Notes                 Date  4/5 4/13 4/18 4/25 5/3 5/10 5/18 6/2 6/8 6/24 7/8 7/22   Total WeeklyDose 67.5 mg 62.5 mg 60 mg 56.25 53.75 mg 55 mg 57.5 mg 57.5 mg 57.5 mg 57.5 mg 57.5 mg 57.5 mg   INR 1.51 2.47 2.85 3.68 3.33 2.36 2.27 2.45  2.40 2.38 1.98 2.09   Notes proced holdx5  Medrol + keflex Medrol + keflex voltaren gel, norco redx2 rec 5/19  Mis-dose rec'd 6/27  allopurinol       Date 8./11 8/19 8/29 9/2 9/16 9/30 10/3 10/17 11/1 11/15 12/5 1/3/23   Total WeeklyDose 57.5 mg 60 mg 62.5 mg 62.5 mg 62.5 mg 62.5 mg 62.5 mg 62.5 mg 62.5 mg 62.5 mg 62.5 mg 62.5 mg   INR 1.86 1.83 2.15 2.29 2.69 2.53 2.3 2.19 2.19 2.39 2.3 2.07   Notes       keflex          Date 1/31  3/1 3/7 3/20 4/5 4/24/23 5/23/23 6/21/23 7/20/23 07/28/23 8/1/23   Total WeeklyDose 62.5 mg 4 day hold 61.25mg 62.5 mg 62.5 mg 62.5 mg 62.5 mg  62.5 mg 62.5 mg 62.5 mg 62.5 mg 62.5 mg    INR 2.47  1.85 2.53 2.69 2.86 2.38 3.0 3.06 2.19 2.28 2.07   Notes          Glucosamine  Cephalexin      Date 08/16 09/18 10/16 11/13 12/14 1/17/24 1/22 1/25       Total WeeklyDose 62.5 mg 62.5 mg 62.5 mg 62.5 mg 62.5 mg 62.5 mg 62.5 mg        INR 2.17 2.43 2.78 2.54 2.21 2.11 2.64 2.67       Notes        Unable to LVM          * takes warfarin in AM  Phone Interview:  Tablet Strength: 7.5mg and 10mg tablet (12/29/21)  Patient Contact Info: [phone] (mobile, preferred); [phone] (home)  Verbal Release Auth: Charo Schneider (mother)  Lab Contact Info: EDUARDO Gonzalez [phone]; fax [phone]    UNABLE TO GET IN CONTACT WITH THE PATIENT. PLEASE DISREGARD THE FOLLOWING PLAN UNTIL ABLE TO GET IN CONTACT WITH PATIENT/ PATIENT REPRESENTATIVE.    Patient Findings  Positives: Upcoming invasive procedure   Negatives: Signs/symptoms of thrombosis, Signs/symptoms of bleeding, Laboratory test error suspected, Change in health, Change in alcohol use, Change in activity, Emergency department visit, Upcoming dental procedure, Missed doses, Extra doses, Change in medications, Change in diet/appetite, Hospital admission, Bruising, Other complaints   Comments: Perioperative plan approved for upcoming procedure on 2/21/24    All other findings negative per patient.     Plan:  1. INR is therapeutic today 1/29/24 at  2.67 (goal 2.0-3.0). Instructed Mr. Brennan to continue warfarin 10 mg oral daily except warfarin 7.5 mg MonWed&Fri, until recheck.   2. Repeat INR in two weeks on 2/12  3. Verbal information provided over the phone. Tito Brennan RBV dosing instructions, expresses understanding by teach back, and has no further questions at this time.    Naseem Schmidt, Pharmacy Technician  1/29/2024  14:10 EST

## 2024-02-26 LAB — INR PPP: 1.43

## 2024-02-27 ENCOUNTER — ANTICOAGULATION VISIT (OUTPATIENT)
Dept: PHARMACY | Facility: HOSPITAL | Age: 64
End: 2024-02-27
Payer: COMMERCIAL

## 2024-02-27 DIAGNOSIS — I48.19 ATRIAL FIBRILLATION, PERSISTENT: ICD-10-CM

## 2024-02-27 RX ORDER — WARFARIN SODIUM 10 MG/1
TABLET ORAL
Qty: 90 TABLET | Refills: 1 | Status: SHIPPED | OUTPATIENT
Start: 2024-02-27

## 2024-02-27 RX ORDER — WARFARIN SODIUM 7.5 MG/1
TABLET ORAL
Qty: 90 TABLET | Refills: 0 | Status: SHIPPED | OUTPATIENT
Start: 2024-02-27

## 2024-02-27 NOTE — PROGRESS NOTES
Anticoagulation Clinic - Remote Progress Note  Remote Lab    Indication: paroxysmal afib   Referring Provider: Ursula  Initial Warfarin Start Date: 18-19 years ago (2001)  Goal INR: 2.0-3.0  Current Drug Interactions: pantaprozole (moderate); Fish oil and black cherry pills occassionally  CHADS-VASc: 3 (HF, HTN)    Diet: some GLV (green beans etc-no cooked greens) 6/27/22  Alcohol:  no  Tobacco: no  OTC Pain Medication: occationally tylenol for sinus HA    INR History:  Date 3/2/20 3/10 3/16 3/24 4/1 4/10 4/24 5/15 6/11 6/25 7/9 7/30 8/27   Total WeeklyDose 45 mg / 5 days 70mg 57.5mg 60mg 57.5mg 60mg 60mg 60mg 60mg 62.5mg 62.5mg 62.5mg 62.5mg   INR 1.84 4.02 3.0 3.28 2.38 2.71 2.26 2.03 1.96 2.06 2.46 2.20 2.38   Notes postop  1x hold misdose         recv'd 8/28     Date 9/24 10/22 10/29 12/1 12/2 12/7 12/14 1/5/21 2/4 3/7 4/15 4/27 5/12   Total WeeklyDose 62.5mg 62.5mg 62.5mg 62.5mg 62.5mg 55mg 62.5mg 62.5mg 62.5mg 62.5mg 62.5mg 60mg 62.5mg   INR 2.27 2.23 2.10 6.90 3.97 2.52 2.22 2.94 2.46 2.68 2.50 2.31 2.58   Notes  Bactrim; clinda  ??? Lab error?  1x miss recv'd 12/15  recv'd 2/8    1x miss; recv'd 5/13     Date 5/18 6/2 6/11 6/21 7/6 8/3 8/6 8/9 8/16 8/22 9/2 9/13 9/20   Total WeeklyDose 62.5mg 52.5mg 66.25  mg 62.5 mg 62.5 mg 58.75 mg 65mg 47.5mg 65mg 60mg 60mg 60mg 65mg   INR 2.06 2.01 3.26 2.25 2.79 1.22 6.07 1.23 3.39 2.36 2.08 1.63 1.9   Notes  1x miss 1xboost   3x hold; 1xboost 2xboost 2x hold  recv'd 8/23  3x hold; 1xboost      Date 9/27 10/12 10/25 11/2 11/11 11/22 12/7 12/28 1/25/22 2/8 2/23 3/3   Total WeeklyDose 65mg 62.5mg 62.5mg 55mg 57.5mg 57.5mg 57.5mg 57.5mg 57.5 mg 57.5 mg 57.5 mg 60 mg   INR 2.4 2.33 3.83 2.40 2.60 1.96 2.30 2.21 2.03 2.49 1.65 2.10   Notes                 Date  4/5 4/13 4/18 4/25 5/3 5/10 5/18 6/2 6/8 6/24 7/8 7/22   Total WeeklyDose 67.5 mg 62.5 mg 60 mg 56.25 53.75 mg 55 mg 57.5 mg 57.5 mg 57.5 mg 57.5 mg 57.5 mg 57.5 mg   INR 1.51 2.47 2.85 3.68 3.33 2.36 2.27 2.45  2.40 2.38 1.98 2.09   Notes proced holdx5  Medrol + keflex Medrol + keflex voltaren gel, norco redx2 rec 5/19  Mis-dose rec'd 6/27  allopurinol       Date 8./11 8/19 8/29 9/2 9/16 9/30 10/3 10/17 11/1 11/15 12/5 1/3/23   Total WeeklyDose 57.5 mg 60 mg 62.5 mg 62.5 mg 62.5 mg 62.5 mg 62.5 mg 62.5 mg 62.5 mg 62.5 mg 62.5 mg 62.5 mg   INR 1.86 1.83 2.15 2.29 2.69 2.53 2.3 2.19 2.19 2.39 2.3 2.07   Notes       keflex          Date 1/31  3/1 3/7 3/20 4/5 4/24/23 5/23/23 6/21/23 7/20/23 07/28/23 8/1/23   Total WeeklyDose 62.5 mg 4 day hold 61.25mg 62.5 mg 62.5 mg 62.5 mg 62.5 mg  62.5 mg 62.5 mg 62.5 mg 62.5 mg 62.5 mg    INR 2.47  1.85 2.53 2.69 2.86 2.38 3.0 3.06 2.19 2.28 2.07   Notes          Glucosamine  Cephalexin      Date 08/16 09/18 10/16 11/13 12/14 1/17/24 1/22 1/29 2/26      Total WeeklyDose 62.5 mg 62.5 mg 62.5 mg 62.5 mg 62.5 mg 62.5 mg 62.5 mg 62.5 mg  40 mg      INR 2.17 2.43 2.78 2.54 2.21 2.11 2.64 2.67 1.43      Notes         4x hold        * takes warfarin in AM  Phone Interview:  Tablet Strength: 7.5mg and 10mg tablet (12/29/21)  Patient Contact Info: [phone] (mobile, preferred); [phone] (home)  Verbal Release Auth: Charo Schneider (mother)  Lab Contact Info: EDUARDO Gonzalez [phone]; fax [phone]      Patient Findings    Negatives: Signs/symptoms of thrombosis, Signs/symptoms of bleeding, Laboratory test error suspected, Change in health, Change in alcohol use, Change in activity, Upcoming invasive procedure, Emergency department visit, Upcoming dental procedure, Missed doses, Extra doses, Change in medications, Change in diet/appetite, Hospital admission, Bruising, Other complaints   Comments: Mr. Brennan stated that his hand surgery went well. He denies all the above listed findings.       Plan:  1. INR is subtherapeutic yesterday at 1.43 after a 4-day hold in preparation for his procedure (goal 2.0-3.0). Due to higher dose being due this evening, instructed Mr. Brennan to boost tomorrow's  dose to warfarin 10 mg and then resume maintenance regimen of warfarin 10 mg oral daily except warfarin 7.5 mg on Monday, Wednesday and Friday until recheck (62.5 mg/week).  2. Due to large stability on current maintenance dose will repeat INR in 2 weeks, 3/11/24.  3. Verbal information provided over the phone. Tito Brennan RBV dosing instructions, expresses understanding by teach back, and has no further questions at this time.      Ira Veliz RPH  2/27/2024  11:40 EST

## 2024-03-12 ENCOUNTER — ANTICOAGULATION VISIT (OUTPATIENT)
Dept: PHARMACY | Facility: HOSPITAL | Age: 64
End: 2024-03-12
Payer: COMMERCIAL

## 2024-03-12 LAB — INR PPP: 1.94

## 2024-03-12 NOTE — PROGRESS NOTES
Anticoagulation Clinic - Remote Progress Note  Remote Lab    Indication: paroxysmal afib   Referring Provider: Ursula  Initial Warfarin Start Date: 18-19 years ago (2001)  Goal INR: 2.0-3.0  Current Drug Interactions: pantaprozole (moderate); Fish oil and black cherry pills occassionally  CHADS-VASc: 3 (HF, HTN)    Diet: some GLV (green beans etc-no cooked greens) 6/27/22  Alcohol:  no  Tobacco: no  OTC Pain Medication: occationally tylenol for sinus HA    INR History:  Date 3/2/20 3/10 3/16 3/24 4/1 4/10 4/24 5/15 6/11 6/25 7/9 7/30 8/27   Total WeeklyDose 45 mg / 5 days 70mg 57.5mg 60mg 57.5mg 60mg 60mg 60mg 60mg 62.5mg 62.5mg 62.5mg 62.5mg   INR 1.84 4.02 3.0 3.28 2.38 2.71 2.26 2.03 1.96 2.06 2.46 2.20 2.38   Notes postop  1x hold misdose         recv'd 8/28     Date 9/24 10/22 10/29 12/1 12/2 12/7 12/14 1/5/21 2/4 3/7 4/15 4/27 5/12   Total WeeklyDose 62.5mg 62.5mg 62.5mg 62.5mg 62.5mg 55mg 62.5mg 62.5mg 62.5mg 62.5mg 62.5mg 60mg 62.5mg   INR 2.27 2.23 2.10 6.90 3.97 2.52 2.22 2.94 2.46 2.68 2.50 2.31 2.58   Notes  Bactrim; clinda  ??? Lab error?  1x miss recv'd 12/15  recv'd 2/8    1x miss; recv'd 5/13     Date 5/18 6/2 6/11 6/21 7/6 8/3 8/6 8/9 8/16 8/22 9/2 9/13 9/20   Total WeeklyDose 62.5mg 52.5mg 66.25  mg 62.5 mg 62.5 mg 58.75 mg 65mg 47.5mg 65mg 60mg 60mg 60mg 65mg   INR 2.06 2.01 3.26 2.25 2.79 1.22 6.07 1.23 3.39 2.36 2.08 1.63 1.9   Notes  1x miss 1xboost   3x hold; 1xboost 2xboost 2x hold  recv'd 8/23  3x hold; 1xboost      Date 9/27 10/12 10/25 11/2 11/11 11/22 12/7 12/28 1/25/22 2/8 2/23 3/3   Total WeeklyDose 65mg 62.5mg 62.5mg 55mg 57.5mg 57.5mg 57.5mg 57.5mg 57.5 mg 57.5 mg 57.5 mg 60 mg   INR 2.4 2.33 3.83 2.40 2.60 1.96 2.30 2.21 2.03 2.49 1.65 2.10   Notes                 Date  4/5 4/13 4/18 4/25 5/3 5/10 5/18 6/2 6/8 6/24 7/8 7/22   Total WeeklyDose 67.5 mg 62.5 mg 60 mg 56.25 53.75 mg 55 mg 57.5 mg 57.5 mg 57.5 mg 57.5 mg 57.5 mg 57.5 mg   INR 1.51 2.47 2.85 3.68 3.33 2.36 2.27 2.45  2.40 2.38 1.98 2.09   Notes proced holdx5  Medrol + keflex Medrol + keflex voltaren gel, norco redx2 rec 5/19  Mis-dose rec'd 6/27  allopurinol       Date 8./11 8/19 8/29 9/2 9/16 9/30 10/3 10/17 11/1 11/15 12/5 1/3/23   Total WeeklyDose 57.5 mg 60 mg 62.5 mg 62.5 mg 62.5 mg 62.5 mg 62.5 mg 62.5 mg 62.5 mg 62.5 mg 62.5 mg 62.5 mg   INR 1.86 1.83 2.15 2.29 2.69 2.53 2.3 2.19 2.19 2.39 2.3 2.07   Notes       keflex          Date 1/31  3/1 3/7 3/20 4/5 4/24/23 5/23/23 6/21/23 7/20/23 07/28/23 8/1/23   Total WeeklyDose 62.5 mg 4 day hold 61.25mg 62.5 mg 62.5 mg 62.5 mg 62.5 mg  62.5 mg 62.5 mg 62.5 mg 62.5 mg 62.5 mg    INR 2.47  1.85 2.53 2.69 2.86 2.38 3.0 3.06 2.19 2.28 2.07   Notes          Glucosamine  Cephalexin      Date 08/16 09/18 10/16 11/13 12/14 1/17/24 1/22 1/29 2/26 3/12     Total WeeklyDose 62.5 mg 62.5 mg 62.5 mg 62.5 mg 62.5 mg 62.5 mg 62.5 mg 62.5 mg  40 mg 62.5 mg      INR 2.17 2.43 2.78 2.54 2.21 2.11 2.64 2.67 1.43 1.94     Notes         4x hold        * takes warfarin in AM  Phone Interview:  Tablet Strength: 7.5mg and 10mg tablet (12/29/21)  Patient Contact Info: [phone] (mobile, preferred); [phone] (home)  Verbal Release Auth: Charo Schneider (mother)  Lab Contact Info: EDUARDO Gonzalez [phone]; fax [phone]      Patient Findings    Negatives: Signs/symptoms of thrombosis, Signs/symptoms of bleeding, Laboratory test error suspected, Change in health, Change in alcohol use, Change in activity, Upcoming invasive procedure, Emergency department visit, Upcoming dental procedure, Missed doses, Extra doses, Change in medications, Change in diet/appetite, Hospital admission, Bruising, Other complaints   Comments: Mr. Brennan denies all the above listed findings. No clear reason for slightly subtherapeutic INR.     Plan:  1. INR slightly subtherapeutic today at 1.94 (goal 2.0-3.0). Due to higher dose being due this evening, instructed Mr. Brennan to boost tomorrow's dose to warfarin 10 mg and then  resume maintenance regimen of warfarin 10 mg oral daily except warfarin 7.5 mg on Monday, Wednesday and Friday until recheck (62.5 mg/week).  2. Due to large stability on current maintenance dose will repeat INR in 2 weeks, 3/26/24. If low at this time will consider increasing dose to only include warfarin 7.5 mg twice weekly.   3. Verbal information provided over the phone. Tito Brennan RBV dosing instructions, expresses understanding by teach back, and has no further questions at this time.      Ira Veliz [facility]  3/12/2024  14:26 EDT

## 2024-03-19 ENCOUNTER — ANTICOAGULATION VISIT (OUTPATIENT)
Dept: PHARMACY | Facility: HOSPITAL | Age: 64
End: 2024-03-19
Payer: COMMERCIAL

## 2024-03-19 LAB — INR PPP: 2.35

## 2024-03-19 NOTE — PROGRESS NOTES
Anticoagulation Clinic - Remote Progress Note  Remote Lab    Indication: paroxysmal afib   Referring Provider: Ursula  Initial Warfarin Start Date: 18-19 years ago (2001)  Goal INR: 2.0-3.0  Current Drug Interactions: pantaprozole (moderate); Fish oil and black cherry pills occassionally  CHADS-VASc: 3 (HF, HTN)    Diet: some GLV (green beans etc-no cooked greens) 6/27/22  Alcohol:  no  Tobacco: no  OTC Pain Medication: occationally tylenol for sinus HA    INR History:  Date 3/2/20 3/10 3/16 3/24 4/1 4/10 4/24 5/15 6/11 6/25 7/9 7/30 8/27   Total WeeklyDose 45 mg / 5 days 70mg 57.5mg 60mg 57.5mg 60mg 60mg 60mg 60mg 62.5mg 62.5mg 62.5mg 62.5mg   INR 1.84 4.02 3.0 3.28 2.38 2.71 2.26 2.03 1.96 2.06 2.46 2.20 2.38   Notes postop  1x hold misdose         recv'd 8/28     Date 9/24 10/22 10/29 12/1 12/2 12/7 12/14 1/5/21 2/4 3/7 4/15 4/27 5/12   Total WeeklyDose 62.5mg 62.5mg 62.5mg 62.5mg 62.5mg 55mg 62.5mg 62.5mg 62.5mg 62.5mg 62.5mg 60mg 62.5mg   INR 2.27 2.23 2.10 6.90 3.97 2.52 2.22 2.94 2.46 2.68 2.50 2.31 2.58   Notes  Bactrim; clinda  ??? Lab error?  1x miss recv'd 12/15  recv'd 2/8    1x miss; recv'd 5/13     Date 5/18 6/2 6/11 6/21 7/6 8/3 8/6 8/9 8/16 8/22 9/2 9/13 9/20   Total WeeklyDose 62.5mg 52.5mg 66.25  mg 62.5 mg 62.5 mg 58.75 mg 65mg 47.5mg 65mg 60mg 60mg 60mg 65mg   INR 2.06 2.01 3.26 2.25 2.79 1.22 6.07 1.23 3.39 2.36 2.08 1.63 1.9   Notes  1x miss 1xboost   3x hold; 1xboost 2xboost 2x hold  recv'd 8/23  3x hold; 1xboost      Date 9/27 10/12 10/25 11/2 11/11 11/22 12/7 12/28 1/25/22 2/8 2/23 3/3   Total WeeklyDose 65mg 62.5mg 62.5mg 55mg 57.5mg 57.5mg 57.5mg 57.5mg 57.5 mg 57.5 mg 57.5 mg 60 mg   INR 2.4 2.33 3.83 2.40 2.60 1.96 2.30 2.21 2.03 2.49 1.65 2.10   Notes                 Date  4/5 4/13 4/18 4/25 5/3 5/10 5/18 6/2 6/8 6/24 7/8 7/22   Total WeeklyDose 67.5 mg 62.5 mg 60 mg 56.25 53.75 mg 55 mg 57.5 mg 57.5 mg 57.5 mg 57.5 mg 57.5 mg 57.5 mg   INR 1.51 2.47 2.85 3.68 3.33 2.36 2.27 2.45  2.40 2.38 1.98 2.09   Notes proced holdx5  Medrol + keflex Medrol + keflex voltaren gel, norco redx2 rec 5/19  Mis-dose rec'd 6/27  allopurinol       Date 8./11 8/19 8/29 9/2 9/16 9/30 10/3 10/17 11/1 11/15 12/5 1/3/23   Total WeeklyDose 57.5 mg 60 mg 62.5 mg 62.5 mg 62.5 mg 62.5 mg 62.5 mg 62.5 mg 62.5 mg 62.5 mg 62.5 mg 62.5 mg   INR 1.86 1.83 2.15 2.29 2.69 2.53 2.3 2.19 2.19 2.39 2.3 2.07   Notes       keflex          Date 1/31  3/1 3/7 3/20 4/5 4/24/23 5/23/23 6/21/23 7/20/23 07/28/23 8/1/23   Total WeeklyDose 62.5 mg 4 day hold 61.25mg 62.5 mg 62.5 mg 62.5 mg 62.5 mg  62.5 mg 62.5 mg 62.5 mg 62.5 mg 62.5 mg    INR 2.47  1.85 2.53 2.69 2.86 2.38 3.0 3.06 2.19 2.28 2.07   Notes          Glucosamine  Cephalexin      Date 08/16 09/18 10/16 11/13 12/14 1/17/24 1/22 1/29 2/26 3/12 3/19/24    Total WeeklyDose 62.5 mg 62.5 mg 62.5 mg 62.5 mg 62.5 mg 62.5 mg 62.5 mg 62.5 mg  40 mg 62.5 mg  65 mg    INR 2.17 2.43 2.78 2.54 2.21 2.11 2.64 2.67 1.43 1.94 2.35    Notes         4x hold  1xboost      * takes warfarin in AM  Phone Interview:  Tablet Strength: 7.5mg and 10mg tablet (12/29/21)  Patient Contact Info: [phone] (mobile, preferred); [phone] (home)  Verbal Release Auth: Charo Jennie (mother)  Lab Contact Info: EDUARDO Gonzalez [phone]; fax [phone]        Patient Findings  Negatives: Signs/symptoms of thrombosis, Signs/symptoms of bleeding, Laboratory test error suspected, Change in health, Change in alcohol use, Change in activity, Upcoming invasive procedure, Emergency department visit, Upcoming dental procedure, Missed doses, Extra doses, Change in medications, Change in diet/appetite, Hospital admission, Bruising, Other complaints   Comments: All findings negative per patient         Plan:  1. INR is therapeutic today at 2.35 (goal 2.0-3.0). Instructed Mr. Brennan to continue maintenance regimen of warfarin 10 mg oral daily except warfarin 7.5 mg on Monday, Wednesday and Friday until recheck  2.  repeat INR in 2 weeks, 4/2/24.   3. Verbal information provided over the phone. Tito Brennan RBV dosing instructions, expresses understanding by teach back, and has no further questions at this time.      Naseem Schmidt, Pharmacy Technician  3/19/2024  13:32 EDT      I, Andrew Karimi, PharmD, have reviewed the note in full and agree with the assessment and plan.  03/19/24  14:27 EDT

## 2024-04-09 ENCOUNTER — TELEPHONE (OUTPATIENT)
Dept: PHARMACY | Facility: HOSPITAL | Age: 64
End: 2024-04-09

## 2024-04-09 ENCOUNTER — ANTICOAGULATION VISIT (OUTPATIENT)
Dept: PHARMACY | Facility: HOSPITAL | Age: 64
End: 2024-04-09

## 2024-04-09 LAB — INR PPP: 1.83

## 2024-04-09 NOTE — PROGRESS NOTES
Anticoagulation Clinic - Remote Progress Note  Remote Lab    Indication: paroxysmal afib   Referring Provider: Ursula  Initial Warfarin Start Date: 18-19 years ago (2001)  Goal INR: 2.0-3.0  Current Drug Interactions: pantaprozole (moderate); Fish oil and black cherry pills occassionally  CHADS-VASc: 3 (HF, HTN)    Diet: some GLV (green beans etc-no cooked greens) 6/27/22  Alcohol:  no  Tobacco: no  OTC Pain Medication: occationally tylenol for sinus HA    INR History:  Date 3/2/20 3/10 3/16 3/24 4/1 4/10 4/24 5/15 6/11 6/25 7/9 7/30 8/27   Total WeeklyDose 45 mg / 5 days 70mg 57.5mg 60mg 57.5mg 60mg 60mg 60mg 60mg 62.5mg 62.5mg 62.5mg 62.5mg   INR 1.84 4.02 3.0 3.28 2.38 2.71 2.26 2.03 1.96 2.06 2.46 2.20 2.38   Notes postop  1x hold misdose         recv'd 8/28     Date 9/24 10/22 10/29 12/1 12/2 12/7 12/14 1/5/21 2/4 3/7 4/15 4/27 5/12   Total WeeklyDose 62.5mg 62.5mg 62.5mg 62.5mg 62.5mg 55mg 62.5mg 62.5mg 62.5mg 62.5mg 62.5mg 60mg 62.5mg   INR 2.27 2.23 2.10 6.90 3.97 2.52 2.22 2.94 2.46 2.68 2.50 2.31 2.58   Notes  Bactrim; clinda  ??? Lab error?  1x miss recv'd 12/15  recv'd 2/8    1x miss; recv'd 5/13     Date 5/18 6/2 6/11 6/21 7/6 8/3 8/6 8/9 8/16 8/22 9/2 9/13 9/20   Total WeeklyDose 62.5mg 52.5mg 66.25  mg 62.5 mg 62.5 mg 58.75 mg 65mg 47.5mg 65mg 60mg 60mg 60mg 65mg   INR 2.06 2.01 3.26 2.25 2.79 1.22 6.07 1.23 3.39 2.36 2.08 1.63 1.9   Notes  1x miss 1xboost   3x hold; 1xboost 2xboost 2x hold  recv'd 8/23  3x hold; 1xboost      Date 9/27 10/12 10/25 11/2 11/11 11/22 12/7 12/28 1/25/22 2/8 2/23 3/3   Total WeeklyDose 65mg 62.5mg 62.5mg 55mg 57.5mg 57.5mg 57.5mg 57.5mg 57.5 mg 57.5 mg 57.5 mg 60 mg   INR 2.4 2.33 3.83 2.40 2.60 1.96 2.30 2.21 2.03 2.49 1.65 2.10   Notes                 Date  4/5 4/13 4/18 4/25 5/3 5/10 5/18 6/2 6/8 6/24 7/8 7/22   Total WeeklyDose 67.5 mg 62.5 mg 60 mg 56.25 53.75 mg 55 mg 57.5 mg 57.5 mg 57.5 mg 57.5 mg 57.5 mg 57.5 mg   INR 1.51 2.47 2.85 3.68 3.33 2.36 2.27 2.45  2.40 2.38 1.98 2.09   Notes proced holdx5  Medrol + keflex Medrol + keflex voltaren gel, norco redx2 rec 5/19  Mis-dose rec'd 6/27  allopurinol       Date 8./11 8/19 8/29 9/2 9/16 9/30 10/3 10/17 11/1 11/15 12/5 1/3/23   Total WeeklyDose 57.5 mg 60 mg 62.5 mg 62.5 mg 62.5 mg 62.5 mg 62.5 mg 62.5 mg 62.5 mg 62.5 mg 62.5 mg 62.5 mg   INR 1.86 1.83 2.15 2.29 2.69 2.53 2.3 2.19 2.19 2.39 2.3 2.07   Notes       keflex          Date 1/31  3/1 3/7 3/20 4/5 4/24/23 5/23/23 6/21/23 7/20/23 07/28/23 8/1/23   Total WeeklyDose 62.5 mg 4 day hold 61.25mg 62.5 mg 62.5 mg 62.5 mg 62.5 mg  62.5 mg 62.5 mg 62.5 mg 62.5 mg 62.5 mg    INR 2.47  1.85 2.53 2.69 2.86 2.38 3.0 3.06 2.19 2.28 2.07   Notes          Glucosamine  Cephalexin      Date 08/16 09/18 10/16 11/13 12/14 1/17/24 1/22 1/29 2/26 3/12 3/19/24 4/9/24   Total WeeklyDose 62.5 mg 62.5 mg 62.5 mg 62.5 mg 62.5 mg 62.5 mg 62.5 mg 62.5 mg  40 mg 62.5 mg  65 mg 62.5 mg   INR 2.17 2.43 2.78 2.54 2.21 2.11 2.64 2.67 1.43 1.94 2.35 1.83   Notes         4x hold  1xboost      * takes warfarin in AM  Phone Interview:  Tablet Strength: 7.5mg and 10mg tablet (12/29/21)  Patient Contact Info: [phone] (mobile, preferred); [phone] (home)  Verbal Release Auth: Charo Schneider (mother)  Lab Contact Info: EDUARDO Gonzalez [phone]; fax [phone]    Patient Findings    Negatives: Signs/symptoms of thrombosis, Signs/symptoms of bleeding, Laboratory test error suspected, Change in health, Change in alcohol use, Change in activity, Upcoming invasive procedure, Emergency department visit, Upcoming dental procedure, Missed doses, Extra doses, Change in medications, Change in diet/appetite, Hospital admission, Bruising, Other complaints   Comments: All findings negative per patient.     Plan:  1. INR is subtherapeutic today at 1.83 (goal 2.0-3.0). Instructed Mr. Brennan to take increased dose of warfarin 10 mg oral daily except warfarin 7.5 mg on Monday, Friday until recheck  2. Repeat INR in  2 weeks, 4/23/24, per patient preference.   3. Verbal information provided over the phone. Tito Brennan RBV dosing instructions, expresses understanding by teach back, and has no further questions at this time.    Georgiana Oshea RPH  4/9/2024  13:56 EDT

## 2024-04-17 ENCOUNTER — OFFICE VISIT (OUTPATIENT)
Dept: CARDIOLOGY | Facility: CLINIC | Age: 64
End: 2024-04-17
Payer: MEDICARE

## 2024-04-17 VITALS
HEART RATE: 106 BPM | WEIGHT: 274 LBS | SYSTOLIC BLOOD PRESSURE: 122 MMHG | OXYGEN SATURATION: 97 % | HEIGHT: 73 IN | DIASTOLIC BLOOD PRESSURE: 72 MMHG | BODY MASS INDEX: 36.31 KG/M2

## 2024-04-17 DIAGNOSIS — I42.8 NICM (NONISCHEMIC CARDIOMYOPATHY): ICD-10-CM

## 2024-04-17 DIAGNOSIS — I48.21 PERMANENT ATRIAL FIBRILLATION: Primary | ICD-10-CM

## 2024-04-17 DIAGNOSIS — I10 ESSENTIAL HYPERTENSION: ICD-10-CM

## 2024-04-17 RX ORDER — ALLOPURINOL 100 MG/1
100 TABLET ORAL DAILY
COMMUNITY
Start: 2024-03-20

## 2024-04-17 RX ORDER — TAMSULOSIN HYDROCHLORIDE 0.4 MG/1
1 CAPSULE ORAL DAILY
COMMUNITY
End: 2024-04-17 | Stop reason: SDDI

## 2024-04-17 RX ORDER — TAMSULOSIN HYDROCHLORIDE 0.4 MG/1
1 CAPSULE ORAL DAILY
COMMUNITY

## 2024-04-17 NOTE — H&P (VIEW-ONLY)
Tito R [facility]  1960  [phone]    04/17/2024    [facility] CARDIOLOGY     Referring Provider: No ref. provider found     Gabe Mims MD  [address] [facility] [place] [address]    Chief Complaint   Patient presents with    PAF    NICM    Hypertension       Problem List:   Persistent atrial fibrillation  CHADSVASc = 3, on warfarin  Diagnosed ~2000 at time of colonoscopy, asymptomatic  Admitted for Tikosyn initiation, 06/2019, failed ECV x 3, ERAF after ICV, Tikosyn discontinued  NICM  Echocardiogram 12/28/2018: EF 40%, LA size 5.2 cm in atrial fibrillation during exam  Echo 10/10/2019 LVEF 40%, Mild MR  Echocardiogram 3/16/2022: EF 46-50%, RVSP < 35 mmHg  Coronary artery disease  Nuclear stress test 2/13/2019: Moderate size, moderate intensity anteroseptal wall reversible defect suggestive of ischemia, EF 37%  Left heart catheterization 3/6/2019: Nonobstructive, noncritical coronary artery disease to the distal RCA, 20-30%, EF 35-40%  Echo 10/10/2019 LVEF 40%, Mild MR  Diabetes mellitus type II  Hypertension  PAD - followed in [place], on Pletal   Hand surgery 2/2023     Allergies  No Known Allergies    Current Medications    Current Outpatient Medications:     allopurinol (ZYLOPRIM) 100 MG tablet, Take 1 tablet by mouth Daily. PRN, Disp: , Rfl:     cetirizine (zyrTEC) 10 MG tablet, Take 1 tablet by mouth Daily., Disp: , Rfl:     cilostazol (PLETAL) 100 MG tablet, 2 (Two) Times a Day., Disp: , Rfl:     Magnesium Oxide 400 (240 Mg) MG tablet, Take 1 tablet by mouth Daily., Disp: 30 tablet, Rfl: 11    metFORMIN (GLUCOPHAGE) 1000 MG tablet, Take 1 tablet by mouth 2 (Two) Times a Day With Meals., Disp: , Rfl:     metoprolol succinate XL (TOPROL-XL) 100 MG 24 hr tablet, TAKE ONE TABLET BY MOUTH TWO TIMES A DAY, Disp: 60 tablet, Rfl: 5    metoprolol tartrate (LOPRESSOR) 25 MG tablet, Take 1 tablet by mouth As Needed (palpitatons, hr >100)., Disp: 60 tablet, Rfl: 11    pantoprazole (PROTONIX) 40 MG EC  "tablet, Take 1 tablet by mouth Daily., Disp: 30 tablet, Rfl: 6    sacubitril-valsartan (Entresto) 49-51 MG tablet, Take 1 tablet by mouth 2 (Two) Times a Day., Disp: 60 tablet, Rfl: 5    sildenafil (VIAGRA) 100 MG tablet, Take 1 tablet by mouth Daily As Needed for Erectile Dysfunction., Disp: , Rfl:     tamsulosin (FLOMAX) 0.4 MG capsule 24 hr capsule, Take 1 capsule by mouth Daily., Disp: , Rfl:     warfarin (COUMADIN) 10 MG tablet, Take 1 tablet by mouth daily or as directed by anticoagulation clinic., Disp: 90 tablet, Rfl: 1    warfarin (COUMADIN) 7.5 MG tablet, Take 1 tablet by mouth daily, or as directed by the anticoagulation clinic., Disp: 90 tablet, Rfl: 0    History of Present Illness     Pt presents for follow up of atrial fibrillation, NICM, and HTN . Since we last saw the pt, recently had hand surgery 2/2024 at [facility] and was told he had a blood clot in his heart found incidentally on a CT scan, but then had an echocardiogram which did not show this. He is on coumadin and INRs are followed [facility] anticoagulation clinic. INRs have been stable. No bleeding issues.  Pt denies SOB, CP, LH, and dizziness. Denies any hospitalizations, ER visits, bleeding, or TIA/CVA symptoms. Overall feels well. BP runs 130s mmHg.   HRs have been 80-90 bpm     ROS:  General:  Denies fatigue, weight gain or loss  Cardiovascular:  Denies CP, PND, syncope, near syncope, edema or palpitations.  Pulmonary:  Denies GOODWIN, cough, or wheezing      Vitals:    04/17/24 1556   BP: 122/72   BP Location: Left arm   Patient Position: Sitting   Pulse: 106   SpO2: 97%   Weight: 124 kg (274 lb)   Height: 185.4 cm (72.99\")     Body mass index is 36.16 kg/m².  PE:  General: NAD  Neck: no JVD, no carotid bruits, no TM  Heart: irr, irr, NL S1, S2,  no rubs, murmurs  Lungs: CTA, no wheezes, rhonchi, or rales  Abd: soft, non-tender, NL BS  Ext: No musculoskeletal deformities, no edema, cyanosis, or clubbing  Psych: normal mood and affect    Diagnostic " Data:        ECG 12 Lead    Date/Time: 4/17/2024 4:17 PM  Performed by: Kayla Strong PA    Authorized by: Kayla Strong PA  Comparison: compared with previous ECG from 3/16/2022  Similar to previous ECG  Rhythm: atrial fibrillation  BPM: 106          Advance Care Planning   ACP discussion was held with the patient during this visit. Patient does not have an advance directive, declines further assistance.       1. Permanent atrial fibrillation    2. NICM (nonischemic cardiomyopathy)    3. Essential hypertension          Plan:  1. Permanent Atrial Fibrillation:   - asymptomatic,  HR stable    2. NICM:   - EF 46-50% Entresto 49/51 BID and Metoprolol XL  - Class I CHF symptoms.    3. HTN:  -  Controlled on BB, Entresto  4. Anticoagulation:   - Chadsvasc=4 on warfarin, INRs followed by anticoagulation clinic. Discussed NOAC, but he would rather continue warfarin.     F/up in 12 months    Electronically signed by KELIN Farias, 04/17/24, 4:07 PM EDT.

## 2024-04-17 NOTE — PROGRESS NOTES
Tito R [facility]  1960  [phone]    04/17/2024    [facility] CARDIOLOGY     Referring Provider: No ref. provider found     Gabe Mims MD  [address] [facility] [place] [address]    Chief Complaint   Patient presents with    PAF    NICM    Hypertension       Problem List:   Persistent atrial fibrillation  CHADSVASc = 3, on warfarin  Diagnosed ~2000 at time of colonoscopy, asymptomatic  Admitted for Tikosyn initiation, 06/2019, failed ECV x 3, ERAF after ICV, Tikosyn discontinued  NICM  Echocardiogram 12/28/2018: EF 40%, LA size 5.2 cm in atrial fibrillation during exam  Echo 10/10/2019 LVEF 40%, Mild MR  Echocardiogram 3/16/2022: EF 46-50%, RVSP < 35 mmHg  Coronary artery disease  Nuclear stress test 2/13/2019: Moderate size, moderate intensity anteroseptal wall reversible defect suggestive of ischemia, EF 37%  Left heart catheterization 3/6/2019: Nonobstructive, noncritical coronary artery disease to the distal RCA, 20-30%, EF 35-40%  Echo 10/10/2019 LVEF 40%, Mild MR  Diabetes mellitus type II  Hypertension  PAD - followed in [place], on Pletal   Hand surgery 2/2023     Allergies  No Known Allergies    Current Medications    Current Outpatient Medications:     allopurinol (ZYLOPRIM) 100 MG tablet, Take 1 tablet by mouth Daily. PRN, Disp: , Rfl:     cetirizine (zyrTEC) 10 MG tablet, Take 1 tablet by mouth Daily., Disp: , Rfl:     cilostazol (PLETAL) 100 MG tablet, 2 (Two) Times a Day., Disp: , Rfl:     Magnesium Oxide 400 (240 Mg) MG tablet, Take 1 tablet by mouth Daily., Disp: 30 tablet, Rfl: 11    metFORMIN (GLUCOPHAGE) 1000 MG tablet, Take 1 tablet by mouth 2 (Two) Times a Day With Meals., Disp: , Rfl:     metoprolol succinate XL (TOPROL-XL) 100 MG 24 hr tablet, TAKE ONE TABLET BY MOUTH TWO TIMES A DAY, Disp: 60 tablet, Rfl: 5    metoprolol tartrate (LOPRESSOR) 25 MG tablet, Take 1 tablet by mouth As Needed (palpitatons, hr >100)., Disp: 60 tablet, Rfl: 11    pantoprazole (PROTONIX) 40 MG EC  "tablet, Take 1 tablet by mouth Daily., Disp: 30 tablet, Rfl: 6    sacubitril-valsartan (Entresto) 49-51 MG tablet, Take 1 tablet by mouth 2 (Two) Times a Day., Disp: 60 tablet, Rfl: 5    sildenafil (VIAGRA) 100 MG tablet, Take 1 tablet by mouth Daily As Needed for Erectile Dysfunction., Disp: , Rfl:     tamsulosin (FLOMAX) 0.4 MG capsule 24 hr capsule, Take 1 capsule by mouth Daily., Disp: , Rfl:     warfarin (COUMADIN) 10 MG tablet, Take 1 tablet by mouth daily or as directed by anticoagulation clinic., Disp: 90 tablet, Rfl: 1    warfarin (COUMADIN) 7.5 MG tablet, Take 1 tablet by mouth daily, or as directed by the anticoagulation clinic., Disp: 90 tablet, Rfl: 0    History of Present Illness     Pt presents for follow up of atrial fibrillation, NICM, and HTN . Since we last saw the pt, recently had hand surgery 2/2024 at [facility] and was told he had a blood clot in his heart found incidentally on a CT scan, but then had an echocardiogram which did not show this. He is on coumadin and INRs are followed [facility] anticoagulation clinic. INRs have been stable. No bleeding issues.  Pt denies SOB, CP, LH, and dizziness. Denies any hospitalizations, ER visits, bleeding, or TIA/CVA symptoms. Overall feels well. BP runs 130s mmHg.   HRs have been 80-90 bpm     ROS:  General:  Denies fatigue, weight gain or loss  Cardiovascular:  Denies CP, PND, syncope, near syncope, edema or palpitations.  Pulmonary:  Denies GOODWIN, cough, or wheezing      Vitals:    04/17/24 1556   BP: 122/72   BP Location: Left arm   Patient Position: Sitting   Pulse: 106   SpO2: 97%   Weight: 124 kg (274 lb)   Height: 185.4 cm (72.99\")     Body mass index is 36.16 kg/m².  PE:  General: NAD  Neck: no JVD, no carotid bruits, no TM  Heart: irr, irr, NL S1, S2,  no rubs, murmurs  Lungs: CTA, no wheezes, rhonchi, or rales  Abd: soft, non-tender, NL BS  Ext: No musculoskeletal deformities, no edema, cyanosis, or clubbing  Psych: normal mood and affect    Diagnostic " Data:        ECG 12 Lead    Date/Time: 4/17/2024 4:17 PM  Performed by: Kayla Strong PA    Authorized by: Kayla Strogn PA  Comparison: compared with previous ECG from 3/16/2022  Similar to previous ECG  Rhythm: atrial fibrillation  BPM: 106          Advance Care Planning   ACP discussion was held with the patient during this visit. Patient does not have an advance directive, declines further assistance.       1. Permanent atrial fibrillation    2. NICM (nonischemic cardiomyopathy)    3. Essential hypertension          Plan:  1. Permanent Atrial Fibrillation:   - asymptomatic,  HR stable    2. NICM:   - EF 46-50% Entresto 49/51 BID and Metoprolol XL  - Class I CHF symptoms.    3. HTN:  -  Controlled on BB, Entresto  4. Anticoagulation:   - Chadsvasc=4 on warfarin, INRs followed by anticoagulation clinic. Discussed NOAC, but he would rather continue warfarin.     F/up in 12 months    Electronically signed by KELIN Farias, 04/17/24, 4:07 PM EDT.

## 2024-04-23 ENCOUNTER — ANTICOAGULATION VISIT (OUTPATIENT)
Dept: PHARMACY | Facility: HOSPITAL | Age: 64
End: 2024-04-23
Payer: MEDICARE

## 2024-04-23 LAB — INR PPP: 2.25

## 2024-04-23 NOTE — PROGRESS NOTES
Anticoagulation Clinic - Remote Progress Note  Remote Lab    Indication: paroxysmal afib   Referring Provider: Ursula  Initial Warfarin Start Date: 18-19 years ago (2001)  Goal INR: 2.0-3.0  Current Drug Interactions: pantaprozole (moderate); Fish oil and black cherry pills occassionally  CHADS-VASc: 3 (HF, HTN)    Diet: some GLV (green beans etc-no cooked greens) 6/27/22  Alcohol:  no  Tobacco: no  OTC Pain Medication: occationally tylenol for sinus HA    INR History:  Date 3/2/20 3/10 3/16 3/24 4/1 4/10 4/24 5/15 6/11 6/25 7/9 7/30 8/27   Total WeeklyDose 45 mg / 5 days 70mg 57.5mg 60mg 57.5mg 60mg 60mg 60mg 60mg 62.5mg 62.5mg 62.5mg 62.5mg   INR 1.84 4.02 3.0 3.28 2.38 2.71 2.26 2.03 1.96 2.06 2.46 2.20 2.38   Notes postop  1x hold misdose         recv'd 8/28     Date 9/24 10/22 10/29 12/1 12/2 12/7 12/14 1/5/21 2/4 3/7 4/15 4/27 5/12   Total WeeklyDose 62.5mg 62.5mg 62.5mg 62.5mg 62.5mg 55mg 62.5mg 62.5mg 62.5mg 62.5mg 62.5mg 60mg 62.5mg   INR 2.27 2.23 2.10 6.90 3.97 2.52 2.22 2.94 2.46 2.68 2.50 2.31 2.58   Notes  Bactrim; clinda  ??? Lab error?  1x miss recv'd 12/15  recv'd 2/8    1x miss; recv'd 5/13     Date 5/18 6/2 6/11 6/21 7/6 8/3 8/6 8/9 8/16 8/22 9/2 9/13 9/20   Total WeeklyDose 62.5mg 52.5mg 66.25  mg 62.5 mg 62.5 mg 58.75 mg 65mg 47.5mg 65mg 60mg 60mg 60mg 65mg   INR 2.06 2.01 3.26 2.25 2.79 1.22 6.07 1.23 3.39 2.36 2.08 1.63 1.9   Notes  1x miss 1xboost   3x hold; 1xboost 2xboost 2x hold  recv'd 8/23  3x hold; 1xboost      Date 9/27 10/12 10/25 11/2 11/11 11/22 12/7 12/28 1/25/22 2/8 2/23 3/3   Total WeeklyDose 65mg 62.5mg 62.5mg 55mg 57.5mg 57.5mg 57.5mg 57.5mg 57.5 mg 57.5 mg 57.5 mg 60 mg   INR 2.4 2.33 3.83 2.40 2.60 1.96 2.30 2.21 2.03 2.49 1.65 2.10   Notes                 Date  4/5 4/13 4/18 4/25 5/3 5/10 5/18 6/2 6/8 6/24 7/8 7/22   Total WeeklyDose 67.5 mg 62.5 mg 60 mg 56.25 53.75 mg 55 mg 57.5 mg 57.5 mg 57.5 mg 57.5 mg 57.5 mg 57.5 mg   INR 1.51 2.47 2.85 3.68 3.33 2.36 2.27 2.45  2.40 2.38 1.98 2.09   Notes proced holdx5  Medrol + keflex Medrol + keflex voltaren gel, norco redx2 rec 5/19  Mis-dose rec'd 6/27  allopurinol       Date 8./11 8/19 8/29 9/2 9/16 9/30 10/3 10/17 11/1 11/15 12/5 1/3/23   Total WeeklyDose 57.5 mg 60 mg 62.5 mg 62.5 mg 62.5 mg 62.5 mg 62.5 mg 62.5 mg 62.5 mg 62.5 mg 62.5 mg 62.5 mg   INR 1.86 1.83 2.15 2.29 2.69 2.53 2.3 2.19 2.19 2.39 2.3 2.07   Notes       keflex          Date 1/31  3/1 3/7 3/20 4/5 4/24/23 5/23/23 6/21/23 7/20/23 07/28/23 8/1/23   Total WeeklyDose 62.5 mg 4 day hold 61.25mg 62.5 mg 62.5 mg 62.5 mg 62.5 mg  62.5 mg 62.5 mg 62.5 mg 62.5 mg 62.5 mg    INR 2.47  1.85 2.53 2.69 2.86 2.38 3.0 3.06 2.19 2.28 2.07   Notes          Glucosamine  Cephalexin      Date 08/16 09/18 10/16 11/13 12/14 1/17/24 1/22 1/29 2/26 3/12 3/19/24 4/9/24 4/23   Total WeeklyDose 62.5 mg 62.5 mg 62.5 mg 62.5 mg 62.5 mg 62.5 mg 62.5 mg 62.5 mg  40 mg 62.5 mg  65 mg 62.5 mg 65 mg   INR 2.17 2.43 2.78 2.54 2.21 2.11 2.64 2.67 1.43 1.94 2.35 1.83 2.25   Notes         4x hold  1xboost       Date               Total WeeklyDose               INR               Notes                   * takes warfarin in AM  Phone Interview:  Tablet Strength: 7.5mg and 10mg tablet (12/29/21)  Patient Contact Info: [phone] (mobile, preferred); [phone] (home)  Verbal Release Auth: Charo Schneider (mother)  Lab Contact Info: EDUARDO Gonzalez [phone]; fax [phone]    Patient Findings    Positives: Upcoming invasive procedure   Negatives: Signs/symptoms of thrombosis, Signs/symptoms of bleeding, Laboratory test error suspected, Change in health, Change in alcohol use, Change in activity, Emergency department visit, Upcoming dental procedure, Missed doses, Extra doses, Change in medications, Change in diet/appetite, Hospital admission, Bruising, Other complaints   Comments: Patient says he is going to have hernia surgery on his side. He said the surgery has not yet been scheduled. Asked patient  to call the clinic once procedure is scheduled to create perioperative plan. All other findings negative per patient.     Plan:  1. INR is therapeutic today at 2.25 (goal 2.0-3.0). Per Soumya Piper, PharmD, instructed Mr. Brennan to continue increased dose of warfarin 10mg oral daily except on Mon/Thurs take warfarin 7.5 mg until recheck.  2. Repeat INR in 2 weeks, 5/07/24, per patient preference.   3. Verbal information provided over the phone. Titobennie Brennan RBV dosing instructions, expresses understanding by teach back, and has no further questions at this time.      Carmen Mirza CPhT   11:08 EDT 4/23/2024    I, Soumya Piper, [facility], have reviewed the note in full and agree with the assessment and plan.  04/23/24  15:40 EDT

## 2024-04-29 ENCOUNTER — TELEPHONE (OUTPATIENT)
Dept: CARDIOLOGY | Facility: CLINIC | Age: 64
End: 2024-04-29
Payer: MEDICARE

## 2024-04-29 ENCOUNTER — DOCUMENTATION (OUTPATIENT)
Dept: CARDIOLOGY | Facility: CLINIC | Age: 64
End: 2024-04-29
Payer: MEDICARE

## 2024-04-29 DIAGNOSIS — I51.3 THROMBUS OF ATRIAL APPENDAGE: Primary | ICD-10-CM

## 2024-04-29 NOTE — TELEPHONE ENCOUNTER
Reviewed Records from [facility]:  CT Abdomen/Pelvis2/28/2024: incidental finding of left atrial appendage thrombus  Transthoracic Echocardiogram 3/1/2024: no mention of left atrial appendage thrombus, no thrombus in LV, EF 25-30% with severe global HK, mild MR, RV function is moderately depressed.     I will talk with Dr. Crowder and/or Dr. Vergara to see if MING needs to be ordered.

## 2024-04-29 NOTE — PROGRESS NOTES
Patient needs MING to definitive assessment of left atrial appendage and to rule out thrombus as he is in need of clearance for a hernia repair with general surgery. Also, he needs to stay on coumadin until we see about his KASSIE. Also,  we will see what his EF is on the MING. May need repeat ischemic evaluation as his EF has decreased from previous EF of 46-50% in 2022.   Dean, can you get this set up and call patient?     Electronically signed by KELIN Farias, 04/29/24, 3:11 PM EDT.

## 2024-04-29 NOTE — TELEPHONE ENCOUNTER
Per Kayla Strong PA-C note, patient needs MING ASAP. MING order placed and patient notified. I advised patient to remain on his coumadin at this time until the MING. He did inform me that eliquis would be covered by his insurance but I told him he could discuss switching to eliquis after evaluation of the potential thrombus that was seen on his CT scan.

## 2024-04-29 NOTE — TELEPHONE ENCOUNTER
"Patient called stating he was at [facility] in March for a \"blood clot in his heart\" and while he was there they did an echocardiogram. He needs surgery for a hernia repair and his surgeon was concerned with the recent echocardiogram results and states he needs additional testing-although she did not specify what other testing she believes he needs. Patient states he has not seen a general cardiologist in years, just Dr. Vergara, but has had a heart cath in the past.     I requested records from [facility].      Patient also states he wants to switch to eliquis and get off coumadin. I advised him to reach out to his insurance to check the coverage of both eliquis and xarelto in case he is able to change.   "

## 2024-04-29 NOTE — TELEPHONE ENCOUNTER
"Yes, he told me this in clinic. Apparently, had a CT scan showing a \"clot\" but had an echocardiogram which did not show anything. Please get records of both CT scan and echocardiogram. Along with all notes from admission. "

## 2024-04-29 NOTE — TELEPHONE ENCOUNTER
Caller: Tito Brennan    Relationship: Self    Best call back number: [phone]    What is the best time to reach you: ANYTIME     What was the call regarding: PATIENT CALLED IN TO SEE ABOUT GETTING MORE TEST DONE PRIOR TO HAVING HERNIA SURGERY ON HIS SIDE. PATIENT STATED THAT SURGEON WAS CONCERNED DUE TO PATIENT HAVING AN ABNORMAL ECHO AND ASKED THAT PATIENT GETS MORE TESTING DONE BEFORE SHE CAN DO THE SURGERY. PATIENT IS HAVING THE ECHO FAXED OVER TO OFFICE.     PATIENT WOULD ALSO LIKE TO SEE ABOUT SWITCHING HIS BLOOD THINNER TO ELIQUIS.     Is it okay if the provider responds through MyChart: PREFERS A CALL

## 2024-05-03 ENCOUNTER — TELEPHONE (OUTPATIENT)
Dept: CARDIOLOGY | Facility: CLINIC | Age: 64
End: 2024-05-03
Payer: MEDICARE

## 2024-05-03 RX ORDER — SACUBITRIL AND VALSARTAN 49; 51 MG/1; MG/1
1 TABLET, FILM COATED ORAL 2 TIMES DAILY
Qty: 60 TABLET | Refills: 5 | Status: SHIPPED | OUTPATIENT
Start: 2024-05-03

## 2024-05-03 NOTE — TELEPHONE ENCOUNTER
Caller: Tito Brennan    Relationship: Self    Best call back number: [phone]    Requested Prescriptions:   Requested Prescriptions     Pending Prescriptions Disp Refills    sacubitril-valsartan (Entresto) 49-51 MG tablet 60 tablet 5     Sig: Take 1 tablet by mouth 2 (Two) Times a Day.        Pharmacy where request should be sent: [address] [phone] [facility] [phone]      Last office visit with prescribing clinician: Visit date not found   Last telemedicine visit with prescribing clinician: Visit date not found   Next office visit with prescribing clinician: 4/30/2025       Does the patient have less than a 3 day supply:  [] Yes  [x] No    Would you like a call back once the refill request has been completed: [] Yes [x] No    If the office needs to give you a call back, can they leave a voicemail: [] Yes [x] No    John Doran Rep   05/03/24 09:07 EDT

## 2024-05-07 ENCOUNTER — ANTICOAGULATION VISIT (OUTPATIENT)
Dept: PHARMACY | Facility: HOSPITAL | Age: 64
End: 2024-05-07
Payer: MEDICARE

## 2024-05-07 LAB — INR PPP: 1.79

## 2024-05-15 ENCOUNTER — DOCUMENTATION (OUTPATIENT)
Dept: CARDIOLOGY | Facility: CLINIC | Age: 64
End: 2024-05-15
Payer: MEDICARE

## 2024-05-15 ENCOUNTER — TELEPHONE (OUTPATIENT)
Dept: CARDIOLOGY | Facility: CLINIC | Age: 64
End: 2024-05-15
Payer: MEDICARE

## 2024-05-15 DIAGNOSIS — I42.8 NICM (NONISCHEMIC CARDIOMYOPATHY): ICD-10-CM

## 2024-05-15 DIAGNOSIS — I48.21 PERMANENT ATRIAL FIBRILLATION: Primary | ICD-10-CM

## 2024-05-15 RX ORDER — METOPROLOL SUCCINATE 100 MG/1
150 TABLET, EXTENDED RELEASE ORAL 2 TIMES DAILY
Qty: 90 TABLET | Refills: 6 | Status: SHIPPED | OUTPATIENT
Start: 2024-05-15

## 2024-05-15 NOTE — TELEPHONE ENCOUNTER
Patient notified and aware. Prescriptions for Eliquis 5 mg BID, Toprol  mg BID and Jardiance 10 mg daily sent to [facility]. I also faxed his BMP order to [facility].

## 2024-05-15 NOTE — TELEPHONE ENCOUNTER
I called to f/u with the patient. He is currently taking Metoprolol succinate 100 mg BID. Do you still want him to take 150 mg daily?

## 2024-05-15 NOTE — TELEPHONE ENCOUNTER
Adam Vergara MD Carter, Emily M, PA; Anel Bailey, RN  The patient is scheduled for a MING this Friday to evaluate for possible left atrial thrombus and reassessment of LVEF.  He apparently had an echocardiogram at the outside hospital that demonstrated LVEF to be approximately 30% and a CT scan with possible left atrial appendage thrombus.  I talked with the patient today.  He tells me that his insurance will cover Eliquis.  Following the MING, I would discontinue his warfarin and initiate Eliquis 5 mg p.o. twice daily.  In addition, I would increase his Toprol-XL to 150 mg daily and add Jardiance 10 mg a day.  He may also need a LifeVest depending upon what his LVEF is from the MING.  Would do BMP 5 days later after Jardiance.  Needs to see us back in clinic in 4 to 6 weeks.

## 2024-05-17 ENCOUNTER — HOSPITAL ENCOUNTER (OUTPATIENT)
Dept: CARDIOLOGY | Facility: HOSPITAL | Age: 64
Discharge: HOME | End: 2024-05-17
Admitting: PHYSICIAN ASSISTANT
Payer: MEDICARE

## 2024-05-17 VITALS
HEIGHT: 73 IN | SYSTOLIC BLOOD PRESSURE: 119 MMHG | WEIGHT: 273.37 LBS | HEART RATE: 95 BPM | OXYGEN SATURATION: 94 % | RESPIRATION RATE: 16 BRPM | BODY MASS INDEX: 36.23 KG/M2 | DIASTOLIC BLOOD PRESSURE: 74 MMHG

## 2024-05-17 DIAGNOSIS — I51.3 THROMBUS OF ATRIAL APPENDAGE: ICD-10-CM

## 2024-05-17 PROCEDURE — 25010000002 MIDAZOLAM PER 1 MG: Performed by: INTERNAL MEDICINE

## 2024-05-17 PROCEDURE — 25010000002 FENTANYL CITRATE (PF) 50 MCG/ML SOLUTION: Performed by: INTERNAL MEDICINE

## 2024-05-17 PROCEDURE — 93312 ECHO TRANSESOPHAGEAL: CPT

## 2024-05-17 PROCEDURE — 93325 DOPPLER ECHO COLOR FLOW MAPG: CPT

## 2024-05-17 PROCEDURE — 93321 DOPPLER ECHO F-UP/LMTD STD: CPT

## 2024-05-17 RX ORDER — FENTANYL CITRATE 50 UG/ML
INJECTION, SOLUTION INTRAMUSCULAR; INTRAVENOUS
Status: COMPLETED | OUTPATIENT
Start: 2024-05-17 | End: 2024-05-17

## 2024-05-17 RX ORDER — MIDAZOLAM HYDROCHLORIDE 1 MG/ML
INJECTION INTRAMUSCULAR; INTRAVENOUS
Status: COMPLETED | OUTPATIENT
Start: 2024-05-17 | End: 2024-05-17

## 2024-05-17 RX ADMIN — MIDAZOLAM 2 MG: 1 INJECTION INTRAMUSCULAR; INTRAVENOUS at 14:32

## 2024-05-17 RX ADMIN — MIDAZOLAM 2 MG: 1 INJECTION INTRAMUSCULAR; INTRAVENOUS at 14:30

## 2024-05-17 RX ADMIN — FENTANYL CITRATE 50 MCG: 50 INJECTION, SOLUTION INTRAMUSCULAR; INTRAVENOUS at 14:30

## 2024-05-17 NOTE — INTERVAL H&P NOTE
H&P updated. The patient was examined and the following changes are noted:        Per telephone encounter with Dr. Vergara  The patient is scheduled for a MING this Friday to evaluate for possible left atrial thrombus and reassessment of LVEF.  He apparently had an echocardiogram at the outside hospital that demonstrated LVEF to be approximately 30% and a CT scan with possible left atrial appendage thrombus.  I talked with the patient today.  He tells me that his insurance will cover Eliquis.  Following the MING, I would discontinue his warfarin and initiate Eliquis 5 mg p.o. twice daily.  In addition, I would increase his Toprol-XL to 150 mg daily and add Jardiance 10 mg a day.  He may also need a LifeVest depending upon what his LVEF is from the MING.  Would do BMP 5 days later after Jardiance.  Needs to see us back in clinic in 4 to 6 weeks.     Patient has not started any of the change medications and was planning on starting them after this transesophageal echocardiogram.  Patient does express that he does not wish to use a LifeVest as he did not like to wear it the last time he was prescribed it.  I did discuss with the patient the risk and benefit of the LifeVest with the patient based on if his EF is truly 30%.  He states that he is willing to make that decision after the transesophageal echocardiogram.    Patient to proceed with transesophageal echocardiogram to evaluate for left atrial appendage clot and to evaluate patient's EF.  Further recommendations to be made after procedure.    Electronically signed by Allison Manuel PA-C, 05/17/24, 1:54 PM EDT.       STAFF CARDIOLOGIST:      SUMMARY:    64 years old with history of nonischemic cardiomyopathy here for MING for evaluation of left atrial thrombus      PERTINENT:    EF 35-40 in the past  A-fib      IMPRESSION:    Nonischemic cardiomyopathy  Atrial fibrillation      RECOMMENDATIONS:    MING did show left atrial thrombus.  Continue Eliquis 5 mg twice  daily  Guideline directed medical therapy        I have seen and examined the patient, reviewed the above note, necessary changes were made and I agree with the final note.   Ino Falcon MD

## 2024-05-19 LAB
ASCENDING AORTA: 4.1 CM
BH CV ECHO MEAS - AO ROOT DIAM: 4.1 CM
BH CV VAS BP RIGHT ARM: NORMAL MMHG

## 2024-05-20 ENCOUNTER — TELEPHONE (OUTPATIENT)
Dept: CARDIOLOGY | Facility: CLINIC | Age: 64
End: 2024-05-20
Payer: MEDICARE

## 2024-05-20 NOTE — TELEPHONE ENCOUNTER
Called patient to discuss results of MING performed on 5/17/2024.  It showed an EF of 26 to 30% and also revealed a left atrial appendage thrombus.  Dr. Vergara already switched his Coumadin to Eliquis just a few days prior to this and he has been taking his Eliquis without missed doses.  Also discussed with Dr. Vergara recommends LifeVest and we will order that and have it placed.  Patient agrees.  Patient states his heart rates have been anywhere from 68 to 90 bpm on recently increased dose of metoprolol.  He will need to follow-up in 4 to 6 weeks.  He has been feeling well at home without symptoms of chest pain or shortness of breath.  He has had a previous left heart catheterization in 2019 noting nonobstructive disease.    Electronically signed by KELIN Farias, 05/20/24, 11:24 AM EDT.

## 2024-06-11 ENCOUNTER — TELEPHONE (OUTPATIENT)
Dept: CARDIOLOGY | Facility: CLINIC | Age: 64
End: 2024-06-11
Payer: MEDICARE

## 2024-06-11 NOTE — TELEPHONE ENCOUNTER
Caller: Tito Brennan    Relationship: Self    Best call back number: [phone]    What is the best time to reach you: ANYTIME     Who are you requesting to speak with (clinical staff, provider,  specific staff member): ANYONE     What was the call regarding: PATIENT WOULD LIKE AN UPDATE ON FINANCIAL ASSISTANCE FOR SINTIA.

## 2024-06-11 NOTE — TELEPHONE ENCOUNTER
I submitted a PA for Eliquis 5mg through [employer].     MAMIE # [id number] (Key: [id number])    Your information has been sent to [facility].

## 2024-06-12 ENCOUNTER — OFFICE VISIT (OUTPATIENT)
Dept: CARDIOLOGY | Facility: CLINIC | Age: 64
End: 2024-06-12
Payer: MEDICARE

## 2024-06-12 VITALS
OXYGEN SATURATION: 98 % | HEIGHT: 73 IN | WEIGHT: 263.6 LBS | SYSTOLIC BLOOD PRESSURE: 127 MMHG | HEART RATE: 98 BPM | BODY MASS INDEX: 34.94 KG/M2 | DIASTOLIC BLOOD PRESSURE: 72 MMHG

## 2024-06-12 DIAGNOSIS — I42.8 NICM (NONISCHEMIC CARDIOMYOPATHY): ICD-10-CM

## 2024-06-12 DIAGNOSIS — Z79.01 CHRONIC ANTICOAGULATION: ICD-10-CM

## 2024-06-12 DIAGNOSIS — I51.3 THROMBUS OF LEFT ATRIAL APPENDAGE: ICD-10-CM

## 2024-06-12 DIAGNOSIS — I48.21 PERMANENT ATRIAL FIBRILLATION: Primary | ICD-10-CM

## 2024-06-12 DIAGNOSIS — I10 ESSENTIAL HYPERTENSION: ICD-10-CM

## 2024-06-12 NOTE — PROGRESS NOTES
Tito NAM [facility]  1960  [phone]    06/12/2024    [facility] CARDIOLOGY     Referring Provider: No ref. provider found     Gabe Mims MD  [address] [facility] [place] [address]    Chief Complaint   Patient presents with    Permanent atrial fibrillation       Problem List:     Persistent atrial fibrillation  CHADSVASc = 3, on warfarin  Diagnosed ~2000 at time of colonoscopy, asymptomatic  Admitted for Tikosyn initiation, 06/2019, failed ECV x 3, ERAF after ICV, Tikosyn discontinued  Left atrial thrombus  CT scan of the chest with possible left atrial appendage thrombus April 2024  MING 5/19/2024 thrombus noted left atrial appendage.  Discontinuation of warfarin initiation of Eliquis  NICM  Echocardiogram 12/28/2018: EF 40%, LA size 5.2 cm in atrial fibrillation during exam  Echo 10/10/2019 LVEF 40%, Mild MR  Echocardiogram 3/16/2022: EF 46-50%, RVSP < 35 mmHg  Echocardiogram 3/1/2024 LVEF 25 to 30% moderate to severe right ventricular enlargement, no significant valvular heart disease  MING 5/19/2024 LVEF 25 to 30%, thrombus noted in left atrial appendage.  Coronary artery disease  Nuclear stress test 2/13/2019: Moderate size, moderate intensity anteroseptal wall reversible defect suggestive of ischemia, EF 37%  Left heart catheterization 3/6/2019: Nonobstructive, noncritical coronary artery disease to the distal RCA, 20-30%, EF 35-40%  Echo 10/10/2019 LVEF 40%, Mild MR  Diabetes mellitus type II  Hypertension  PAD - followed in [place], on Pletal   Hand surgery 2/2023       Allergies  No Known Allergies    Current Medications    Current Outpatient Medications:     allopurinol (ZYLOPRIM) 100 MG tablet, Take 1 tablet by mouth Daily. PRN, Disp: , Rfl:     apixaban (ELIQUIS) 5 MG tablet tablet, Take 1 tablet by mouth 2 (Two) Times a Day., Disp: 60 tablet, Rfl: 6    cetirizine (zyrTEC) 10 MG tablet, Take 1 tablet by mouth Daily., Disp: , Rfl:     cilostazol (PLETAL) 100 MG tablet, 2 (Two) Times a  Day., Disp: , Rfl:     empagliflozin (JARDIANCE) 10 MG tablet tablet, Take 1 tablet by mouth Daily., Disp: 30 tablet, Rfl: 6    Magnesium Oxide 400 (240 Mg) MG tablet, Take 1 tablet by mouth Daily., Disp: 30 tablet, Rfl: 11    metFORMIN (GLUCOPHAGE) 1000 MG tablet, Take 1 tablet by mouth 2 (Two) Times a Day With Meals., Disp: , Rfl:     metoprolol succinate XL (Toprol XL) 100 MG 24 hr tablet, Take 1.5 tablets by mouth 2 (Two) Times a Day., Disp: 90 tablet, Rfl: 6    metoprolol succinate XL (TOPROL-XL) 100 MG 24 hr tablet, TAKE ONE TABLET BY MOUTH TWO TIMES A DAY, Disp: 60 tablet, Rfl: 5    metoprolol tartrate (LOPRESSOR) 25 MG tablet, Take 1 tablet by mouth As Needed (palpitatons, hr >100)., Disp: 60 tablet, Rfl: 11    pantoprazole (PROTONIX) 40 MG EC tablet, Take 1 tablet by mouth Daily., Disp: 30 tablet, Rfl: 6    sacubitril-valsartan (Entresto) 49-51 MG tablet, Take 1 tablet by mouth 2 (Two) Times a Day., Disp: 60 tablet, Rfl: 5    sildenafil (VIAGRA) 100 MG tablet, Take 1 tablet by mouth Daily As Needed for Erectile Dysfunction., Disp: , Rfl:     tamsulosin (FLOMAX) 0.4 MG capsule 24 hr capsule, Take 1 capsule by mouth Daily., Disp: , Rfl:     History of Present Illness     Pt presents for follow up of AF/DCM/LA thrombus/CAD/HTN. Since we last saw the pt, pt had undergone an echocardiogram at an outside institution that demonstrated LVEF that was reduced to 25%.  Additionally had a CT scan of the chest that suggested left atrial thrombus.  Patient was on warfarin.  He underwent a MING here in [place] in May that demonstrated left atrial appendage thrombus and LVEF again depressed at 25 to 30%.  His medication was subsequently altered including Jardiance, Entresto, and at elevated dose of Toprol-XL.  In addition, the patient has been wearing a LifeVest due to depressed LVEF.  Today he complains of no symptoms.  He states he feels well is.  His blood pressure is running approximately 104 millimeters of mercury  "over 60 mmHg at home.  Heart rates running from 80 to 90 bpm.  He remains minimally symptomatic.  Denies any dyspnea on exertion or chest pain/chest tightness.  He has been compliant with his medical therapy.        Vitals:    06/12/24 1127   BP: 127/72   Pulse: 98   SpO2: 98%   Weight: 120 kg (263 lb 9.6 oz)   Height: 185.4 cm (73\")     Body mass index is 34.78 kg/m².  PE:  General: NAD  Neck: no JVD, no carotid bruits, no TM  Heart irregular regular rate and rhythm normal S1-S2.  No murmurs appreciated.  Lungs: CTA, no wheezes, rhonchi, or rales  Abd: soft, non-tender, NL BS  Ext: No musculoskeletal deformities, no edema, cyanosis, or clubbing  Psych: normal mood and affect    Diagnostic Data:      Procedures           1. Permanent atrial fibrillation    2. NICM (nonischemic cardiomyopathy)    3. Thrombus of left atrial appendage    4. Essential hypertension    5. Chronic anticoagulation          Plan:    1. Permanent Atrial Fibrillation: Asymptomatic.  Heart rate better with increased dose of Toprol-XL.    2. NICM:   - EF 46-50% Entresto 49/51 BID and Metoprolol XL  Worsening of LVEF approximately 25 to 30% in the setting of minimal symptomatology.  Now wearing a LifeVest.  Jardiance and Toprol-XL doses increased recently.  Will set up a repeat limited echocardiogram in 3 weeks in [place] to reassess LVEF.  The patient may need ICD implantation.  Consider ischemic evaluation even though patient minimally symptomatic at this time.    3.  Left atrial thrombus while on warfarin.  Now switched to Eliquis.  Will have MING reread today.  Will need repeat MING 3 to 4 months after initiation of Eliquis to determine if left atrial thrombus is still present.  Chadsvasc=4 on Eliquis    4. HTN:  -  Controlled on current medications      F/up in 3 months      "

## 2024-07-11 ENCOUNTER — TELEPHONE (OUTPATIENT)
Dept: CARDIOLOGY | Facility: CLINIC | Age: 64
End: 2024-07-11
Payer: MEDICARE

## 2024-07-11 DIAGNOSIS — I48.21 PERMANENT ATRIAL FIBRILLATION: Primary | ICD-10-CM

## 2024-07-11 NOTE — TELEPHONE ENCOUNTER
"Pt is at [facility] for limited echo and his HR was 128. They stated he has been running between 100-128 while they performed the echo. Per the radiology tech, pt states he feels \"OK\". They state he has been taking an antibiotic for 1 week for an infection in his knee and since that time, his HR has been more elevated. Will fax an order to them for patient to have EKG done today at [facility].   "

## 2024-07-12 ENCOUNTER — TELEPHONE (OUTPATIENT)
Dept: PHARMACY | Facility: HOSPITAL | Age: 64
End: 2024-07-12

## 2024-07-12 NOTE — TELEPHONE ENCOUNTER
Patient says he has switched to Eliquis. Will call referring provider to confirm and resolve episode of care.    Carmen Mirza CPhT   14:18 EDT 7/12/2024      Confirmed with Anel at Dr. Vergara's office that patient has been transitioned to Eliquis. Episode of care has been resolved.    Carmen Mirza CPhT   14:40 EDT 7/12/2024

## 2024-07-31 ENCOUNTER — TELEPHONE (OUTPATIENT)
Dept: CARDIOLOGY | Facility: CLINIC | Age: 64
End: 2024-07-31

## 2024-07-31 NOTE — TELEPHONE ENCOUNTER
Caller: Tito Brennan    Relationship: Self    Best call back number: [phone]    Caller requesting test results: PATIENT    What test was performed: ECHO/EKG    When was the test performed: 07.11.24    Where [facility] the test performed[facility]    Additional notes: PATIENT IS WAITING ON RESULTS TO SCHEDULE FOR HIS HERNIA SURGERY. PLEASE REACH OUT ASAP.

## 2024-07-31 NOTE — TELEPHONE ENCOUNTER
Patient called to follow up on his echo/EKG results so that he can schedule his hernia surgery. Records are on your desk.

## 2024-08-01 NOTE — TELEPHONE ENCOUNTER
Caller: Tito Brennan    Relationship: Self    Best call back number: [phone]      What was the call regarding: PATIENT WAS CALLING BACK ABOUT HIS REQUEST TO RECEIVE HIS TEST RESULTS. HE WOULD LIKE TO SPEAK TO SOMEONE ASAP. PLEASE REACH OUT TO THE PATIENT ASAP.

## 2024-08-02 ENCOUNTER — TELEPHONE (OUTPATIENT)
Dept: CARDIOLOGY | Facility: CLINIC | Age: 64
End: 2024-08-02
Payer: MEDICARE

## 2024-08-02 NOTE — TELEPHONE ENCOUNTER
Dr. Vergara and I have tried to call the patient back to f/u but he does not answer. We cannot leave a message because his voicemail is not accepting any new messages.

## 2024-08-02 NOTE — TELEPHONE ENCOUNTER
Patient has called several times and would like to know if he is cleared for surgery and what his current treatment plan is?

## 2024-08-06 DIAGNOSIS — I48.21 PERMANENT ATRIAL FIBRILLATION: Primary | ICD-10-CM

## 2024-08-06 DIAGNOSIS — I42.8 NICM (NONISCHEMIC CARDIOMYOPATHY): ICD-10-CM

## 2024-08-07 ENCOUNTER — DOCUMENTATION (OUTPATIENT)
Dept: CARDIOLOGY | Facility: CLINIC | Age: 64
End: 2024-08-07
Payer: MEDICARE

## 2024-08-07 NOTE — PROGRESS NOTES
Shared Decision Making:     Pt is being set up for an Implantable Cardioverter- Defibrillator (ICD) for primary prevention due to dilated cardiomyopathy and persistently low EF despite optimally tolerated GDMT.    The cardiologist and physician assistant have discussed and allowed the pt to ask questions regarding ICD.  Pt was informed that a Shared Decision ICD booklet would be placed in the mail to him today.     [facility]la [facility]  8/7/2024   12:28 EDT

## 2024-09-30 ENCOUNTER — HOSPITAL ENCOUNTER (OUTPATIENT)
Facility: HOSPITAL | Age: 64
Setting detail: HOSPITAL OUTPATIENT SURGERY
End: 2024-09-30
Attending: INTERNAL MEDICINE | Admitting: INTERNAL MEDICINE
Payer: MEDICARE

## 2024-09-30 DIAGNOSIS — I42.8 NICM (NONISCHEMIC CARDIOMYOPATHY): ICD-10-CM

## 2024-09-30 DIAGNOSIS — I48.21 PERMANENT ATRIAL FIBRILLATION: ICD-10-CM

## 2024-10-04 ENCOUNTER — DOCUMENTATION (OUTPATIENT)
Dept: CARDIOLOGY | Facility: CLINIC | Age: 64
End: 2024-10-04
Payer: MEDICARE

## 2024-10-04 NOTE — PROGRESS NOTES
Patient has known HFrEF.  Last transesophageal echocardiogram May 2024 showed the following:    Left ventricular ejection fraction appears to be 26 - 30%.    The left atrial cavity is dilated.    The right atrial cavity is dilated.    Mild dilation of the aortic root is present. The aortic root measures 4.1 cm. Mild dilation of the sinuses of Valsalva is present. Mild dilation of the ascending aorta is present.    The left atrial cavity is dilated. Left atrial appendage morphology best described as chicken wing. There is a thrombus noted within the left atrial appendage.    Electronically signed by PALOA Anderson, 10/04/24, 3:32 PM EDT.

## 2024-10-07 ENCOUNTER — HOSPITAL ENCOUNTER (OUTPATIENT)
Dept: CARDIOLOGY | Facility: HOSPITAL | Age: 64
Discharge: HOME | End: 2024-10-07
Attending: INTERNAL MEDICINE | Admitting: INTERNAL MEDICINE
Payer: MEDICARE

## 2024-10-07 ENCOUNTER — APPOINTMENT (OUTPATIENT)
Dept: GENERAL RADIOLOGY | Facility: HOSPITAL | Age: 64
End: 2024-10-07
Payer: MEDICARE

## 2024-10-07 VITALS
HEIGHT: 73 IN | SYSTOLIC BLOOD PRESSURE: 107 MMHG | WEIGHT: 255.73 LBS | OXYGEN SATURATION: 96 % | TEMPERATURE: 97.3 F | RESPIRATION RATE: 12 BRPM | DIASTOLIC BLOOD PRESSURE: 69 MMHG | HEART RATE: 92 BPM | BODY MASS INDEX: 33.89 KG/M2

## 2024-10-07 DIAGNOSIS — I42.0 DILATED CARDIOMYOPATHY: ICD-10-CM

## 2024-10-07 DIAGNOSIS — I42.8 NICM (NONISCHEMIC CARDIOMYOPATHY): ICD-10-CM

## 2024-10-07 DIAGNOSIS — I48.21 PERMANENT ATRIAL FIBRILLATION: ICD-10-CM

## 2024-10-07 PROBLEM — I50.9 CHF (CONGESTIVE HEART FAILURE): Status: ACTIVE | Noted: 2024-10-07

## 2024-10-07 LAB
ANION GAP SERPL CALCULATED.3IONS-SCNC: 12 MMOL/L (ref 5–15)
BH CV ECHO MEAS - AO ROOT DIAM: 4.1 CM
BH CV VAS BP RIGHT ARM: NORMAL MMHG
BUN SERPL-MCNC: 17 MG/DL (ref 8–23)
BUN/CREAT SERPL: 16.2 (ref 7–25)
CALCIUM SPEC-SCNC: 9.9 MG/DL (ref 8.6–10.5)
CHLORIDE SERPL-SCNC: 101 MMOL/L (ref 98–107)
CO2 SERPL-SCNC: 28 MMOL/L (ref 22–29)
CREAT SERPL-MCNC: 1.05 MG/DL (ref 0.76–1.27)
DEPRECATED RDW RBC AUTO: 43.1 FL (ref 37–54)
EGFRCR SERPLBLD CKD-EPI 2021: 78.8 ML/MIN/1.73
ERYTHROCYTE [DISTWIDTH] IN BLOOD BY AUTOMATED COUNT: 13.2 % (ref 12.3–15.4)
GLUCOSE SERPL-MCNC: 132 MG/DL (ref 65–99)
HCT VFR BLD AUTO: 46.4 % (ref 37.5–51)
HGB BLD-MCNC: 15.4 G/DL (ref 13–17.7)
MCH RBC QN AUTO: 29.6 PG (ref 26.6–33)
MCHC RBC AUTO-ENTMCNC: 33.2 G/DL (ref 31.5–35.7)
MCV RBC AUTO: 89.2 FL (ref 79–97)
PLATELET # BLD AUTO: 203 10*3/MM3 (ref 140–450)
PMV BLD AUTO: 9.8 FL (ref 6–12)
POTASSIUM SERPL-SCNC: 4.5 MMOL/L (ref 3.5–5.2)
RBC # BLD AUTO: 5.2 10*6/MM3 (ref 4.14–5.8)
SODIUM SERPL-SCNC: 141 MMOL/L (ref 136–145)
WBC NRBC COR # BLD AUTO: 7.94 10*3/MM3 (ref 3.4–10.8)

## 2024-10-07 PROCEDURE — 99152 MOD SED SAME PHYS/QHP 5/>YRS: CPT | Performed by: INTERNAL MEDICINE

## 2024-10-07 PROCEDURE — 25010000002 FENTANYL CITRATE (PF) 50 MCG/ML SOLUTION: Performed by: INTERNAL MEDICINE

## 2024-10-07 PROCEDURE — 25010000002 CEFAZOLIN PER 500 MG: Performed by: INTERNAL MEDICINE

## 2024-10-07 PROCEDURE — 25010000002 MIDAZOLAM PER 1 MG: Performed by: INTERNAL MEDICINE

## 2024-10-07 PROCEDURE — 25010000002 ONDANSETRON PER 1 MG: Performed by: INTERNAL MEDICINE

## 2024-10-07 PROCEDURE — 93312 ECHO TRANSESOPHAGEAL: CPT | Performed by: INTERNAL MEDICINE

## 2024-10-07 PROCEDURE — 25010000002 CEFAZOLIN PER 500 MG

## 2024-10-07 PROCEDURE — C1777 LEAD, AICD, ENDO SINGLE COIL: HCPCS | Performed by: INTERNAL MEDICINE

## 2024-10-07 PROCEDURE — 93320 DOPPLER ECHO COMPLETE: CPT

## 2024-10-07 PROCEDURE — 25810000003 SODIUM CHLORIDE 0.9 % SOLUTION: Performed by: INTERNAL MEDICINE

## 2024-10-07 PROCEDURE — 33249 INSJ/RPLCMT DEFIB W/LEAD(S): CPT | Performed by: INTERNAL MEDICINE

## 2024-10-07 PROCEDURE — 71045 X-RAY EXAM CHEST 1 VIEW: CPT

## 2024-10-07 PROCEDURE — 25010000002 BUPIVACAINE 0.5 % SOLUTION: Performed by: INTERNAL MEDICINE

## 2024-10-07 PROCEDURE — 25010000002 LIDOCAINE 1 % SOLUTION: Performed by: INTERNAL MEDICINE

## 2024-10-07 PROCEDURE — 93312 ECHO TRANSESOPHAGEAL: CPT

## 2024-10-07 PROCEDURE — 99152 MOD SED SAME PHYS/QHP 5/>YRS: CPT

## 2024-10-07 PROCEDURE — 93325 DOPPLER ECHO COLOR FLOW MAPG: CPT

## 2024-10-07 PROCEDURE — 80048 BASIC METABOLIC PNL TOTAL CA: CPT | Performed by: INTERNAL MEDICINE

## 2024-10-07 PROCEDURE — 99153 MOD SED SAME PHYS/QHP EA: CPT | Performed by: INTERNAL MEDICINE

## 2024-10-07 PROCEDURE — 93325 DOPPLER ECHO COLOR FLOW MAPG: CPT | Performed by: INTERNAL MEDICINE

## 2024-10-07 PROCEDURE — C1892 INTRO/SHEATH,FIXED,PEEL-AWAY: HCPCS | Performed by: INTERNAL MEDICINE

## 2024-10-07 PROCEDURE — C1722 AICD, SINGLE CHAMBER: HCPCS | Performed by: INTERNAL MEDICINE

## 2024-10-07 PROCEDURE — 85027 COMPLETE CBC AUTOMATED: CPT | Performed by: INTERNAL MEDICINE

## 2024-10-07 PROCEDURE — 93320 DOPPLER ECHO COMPLETE: CPT | Performed by: INTERNAL MEDICINE

## 2024-10-07 DEVICE — IMPLANTABLE CARDIOVERTER DEFIBRILLATOR VR
Type: IMPLANTABLE DEVICE | Status: FUNCTIONAL
Brand: VIGILANT™ EL ICD VR

## 2024-10-07 DEVICE — INTEGRATED BIPOLAR PACE/SENSE AND DEFIBRILLATION LEAD
Type: IMPLANTABLE DEVICE | Status: FUNCTIONAL
Brand: RELIANCE 4-FRONT™

## 2024-10-07 RX ORDER — ETOMIDATE 2 MG/ML
0.05 INJECTION INTRAVENOUS
Status: DISCONTINUED | OUTPATIENT
Start: 2024-10-07 | End: 2024-10-07 | Stop reason: HOSPADM

## 2024-10-07 RX ORDER — ONDANSETRON 2 MG/ML
4 INJECTION INTRAMUSCULAR; INTRAVENOUS EVERY 6 HOURS PRN
Status: DISCONTINUED | OUTPATIENT
Start: 2024-10-07 | End: 2024-10-07 | Stop reason: HOSPADM

## 2024-10-07 RX ORDER — NALOXONE HCL 0.4 MG/ML
0.4 VIAL (ML) INJECTION ONCE AS NEEDED
Status: DISCONTINUED | OUTPATIENT
Start: 2024-10-07 | End: 2024-10-07 | Stop reason: HOSPADM

## 2024-10-07 RX ORDER — NITROGLYCERIN 0.4 MG/1
0.4 TABLET SUBLINGUAL
Status: DISCONTINUED | OUTPATIENT
Start: 2024-10-07 | End: 2024-10-07 | Stop reason: HOSPADM

## 2024-10-07 RX ORDER — MIDAZOLAM HYDROCHLORIDE 1 MG/ML
2-8 INJECTION INTRAMUSCULAR; INTRAVENOUS ONCE AS NEEDED
Status: DISCONTINUED | OUTPATIENT
Start: 2024-10-07 | End: 2024-10-07 | Stop reason: HOSPADM

## 2024-10-07 RX ORDER — SODIUM CHLORIDE 0.9 % (FLUSH) 0.9 %
10 SYRINGE (ML) INJECTION AS NEEDED
Status: DISCONTINUED | OUTPATIENT
Start: 2024-10-07 | End: 2024-10-07 | Stop reason: HOSPADM

## 2024-10-07 RX ORDER — ACETAMINOPHEN 160 MG/5ML
650 SOLUTION ORAL EVERY 4 HOURS PRN
Status: DISCONTINUED | OUTPATIENT
Start: 2024-10-07 | End: 2024-10-07 | Stop reason: HOSPADM

## 2024-10-07 RX ORDER — ACETAMINOPHEN 325 MG/1
650 TABLET ORAL EVERY 4 HOURS PRN
Status: DISCONTINUED | OUTPATIENT
Start: 2024-10-07 | End: 2024-10-07 | Stop reason: HOSPADM

## 2024-10-07 RX ORDER — SODIUM CHLORIDE 0.9 % (FLUSH) 0.9 %
3 SYRINGE (ML) INJECTION EVERY 12 HOURS SCHEDULED
Status: DISCONTINUED | OUTPATIENT
Start: 2024-10-07 | End: 2024-10-07 | Stop reason: HOSPADM

## 2024-10-07 RX ORDER — FENTANYL CITRATE 50 UG/ML
INJECTION, SOLUTION INTRAMUSCULAR; INTRAVENOUS
Status: COMPLETED | OUTPATIENT
Start: 2024-10-07 | End: 2024-10-07

## 2024-10-07 RX ORDER — ONDANSETRON 2 MG/ML
INJECTION INTRAMUSCULAR; INTRAVENOUS
Status: DISCONTINUED | OUTPATIENT
Start: 2024-10-07 | End: 2024-10-07 | Stop reason: HOSPADM

## 2024-10-07 RX ORDER — MIDAZOLAM HYDROCHLORIDE 1 MG/ML
INJECTION INTRAMUSCULAR; INTRAVENOUS
Status: COMPLETED | OUTPATIENT
Start: 2024-10-07 | End: 2024-10-07

## 2024-10-07 RX ORDER — FENTANYL CITRATE 50 UG/ML
50-100 INJECTION, SOLUTION INTRAMUSCULAR; INTRAVENOUS ONCE AS NEEDED
Status: DISCONTINUED | OUTPATIENT
Start: 2024-10-07 | End: 2024-10-07 | Stop reason: HOSPADM

## 2024-10-07 RX ORDER — ETOMIDATE 2 MG/ML
0.1 INJECTION INTRAVENOUS ONCE AS NEEDED
Status: DISCONTINUED | OUTPATIENT
Start: 2024-10-07 | End: 2024-10-07 | Stop reason: HOSPADM

## 2024-10-07 RX ORDER — LIDOCAINE HYDROCHLORIDE 10 MG/ML
INJECTION, SOLUTION INFILTRATION; PERINEURAL
Status: DISCONTINUED | OUTPATIENT
Start: 2024-10-07 | End: 2024-10-07 | Stop reason: HOSPADM

## 2024-10-07 RX ORDER — MIDAZOLAM HYDROCHLORIDE 1 MG/ML
INJECTION INTRAMUSCULAR; INTRAVENOUS
Status: DISCONTINUED | OUTPATIENT
Start: 2024-10-07 | End: 2024-10-07 | Stop reason: HOSPADM

## 2024-10-07 RX ORDER — SODIUM CHLORIDE 9 MG/ML
INJECTION, SOLUTION INTRAVENOUS
Status: COMPLETED | OUTPATIENT
Start: 2024-10-07 | End: 2024-10-07

## 2024-10-07 RX ORDER — BUPIVACAINE HYDROCHLORIDE 5 MG/ML
INJECTION, SOLUTION PERINEURAL
Status: DISCONTINUED | OUTPATIENT
Start: 2024-10-07 | End: 2024-10-07 | Stop reason: HOSPADM

## 2024-10-07 RX ORDER — FENTANYL CITRATE 50 UG/ML
INJECTION, SOLUTION INTRAMUSCULAR; INTRAVENOUS
Status: DISCONTINUED | OUTPATIENT
Start: 2024-10-07 | End: 2024-10-07 | Stop reason: HOSPADM

## 2024-10-07 RX ORDER — SODIUM CHLORIDE 9 MG/ML
40 INJECTION, SOLUTION INTRAVENOUS AS NEEDED
Status: DISCONTINUED | OUTPATIENT
Start: 2024-10-07 | End: 2024-10-07 | Stop reason: HOSPADM

## 2024-10-07 RX ORDER — ACETAMINOPHEN 650 MG/1
650 SUPPOSITORY RECTAL EVERY 4 HOURS PRN
Status: DISCONTINUED | OUTPATIENT
Start: 2024-10-07 | End: 2024-10-07 | Stop reason: HOSPADM

## 2024-10-07 RX ORDER — ETOMIDATE 2 MG/ML
INJECTION INTRAVENOUS
Status: DISCONTINUED | OUTPATIENT
Start: 2024-10-07 | End: 2024-10-07 | Stop reason: HOSPADM

## 2024-10-07 RX ORDER — FLUMAZENIL 0.1 MG/ML
0.5 INJECTION INTRAVENOUS ONCE AS NEEDED
Status: DISCONTINUED | OUTPATIENT
Start: 2024-10-07 | End: 2024-10-07 | Stop reason: HOSPADM

## 2024-10-07 RX ADMIN — MIDAZOLAM HYDROCHLORIDE 2 MG: 1 INJECTION, SOLUTION INTRAMUSCULAR; INTRAVENOUS at 12:17

## 2024-10-07 RX ADMIN — SODIUM CHLORIDE 2000 MG: 900 INJECTION INTRAVENOUS at 16:27

## 2024-10-07 RX ADMIN — MIDAZOLAM HYDROCHLORIDE 2 MG: 1 INJECTION, SOLUTION INTRAMUSCULAR; INTRAVENOUS at 12:25

## 2024-10-07 RX ADMIN — MIDAZOLAM HYDROCHLORIDE 2 MG: 1 INJECTION, SOLUTION INTRAMUSCULAR; INTRAVENOUS at 12:19

## 2024-10-07 RX ADMIN — FENTANYL CITRATE 50 MCG: 50 INJECTION, SOLUTION INTRAMUSCULAR; INTRAVENOUS at 12:17

## 2024-10-07 NOTE — H&P
Pre-ICD Implantation History and Physical  [facility] at [facility]      Patient:  Tito Brennan  :  1960  MRN: [id number]    PCP:  Gabe Mims MD  PHONE:  [phone]    DATE: 10/7/2024  ID: Tito Brennan is a 64 y.o. male from [facility]    CC: HFpEF, left atrial appendage thrombus    Problem List:   Persistent atrial fibrillation  CHADSVASc = 3, on warfarin  Diagnosed ~ at time of colonoscopy, asymptomatic  Admitted for Tikosyn initiation, 2019, failed ECV x 3, ERAF after ICV, Tikosyn discontinued  Left atrial thrombus  CT scan of the chest with possible left atrial appendage thrombus 2024  MING 2024 thrombus noted left atrial appendage.  Discontinuation of warfarin initiation of Eliquis  Chronic Systolic heart FAilureNICM  Echocardiogram 2018: EF 40%, LA size 5.2 cm in atrial fibrillation during exam  Echo 10/10/2019 LVEF 40%, Mild MR  Echocardiogram 3/16/2022: EF 46-50%, RVSP < 35 mmHg  Echocardiogram 3/1/2024 LVEF 25 to 30% moderate to severe right ventricular enlargement, no significant valvular heart disease  MING 2024 LVEF 25 to 30%, thrombus noted in left atrial appendage.  Coronary artery disease  Nuclear stress test 2019: Moderate size, moderate intensity anteroseptal wall reversible defect suggestive of ischemia, EF 37%  Left heart catheterization 3/6/2019: Nonobstructive, noncritical coronary artery disease to the distal RCA, 20-30%, EF 35-40%  Echo 10/10/2019 LVEF 40%, Mild MR  Diabetes mellitus type II  Hypertension  PAD - followed in [place], on Pletal   Hand surgery 2023     BRIEF HPI:   Mr. Guthrie is a 64-year-old male with the above medical history who presents today for MING to assess any remnant left atrial appendage thrombus by Dr. Crodwer followed by implantable cardioverter defibrillator for primary prevention of cardiac death due to nonischemic cardiomyopathy with persistently low LVEF despite optimally tolerated GDMT.   "Patient had an echocardiogram at OSH demonstrating LVEF 25% in March. Additionally had a CT scan of the chest that suggested left atrial thrombus. Patient was on warfarin. He underwent a MING here in [place] in May that demonstrated left atrial appendage thrombus and LVEF again depressed at 25 to 30%.  His warfarin was switched to Eliquis.  His medication was subsequently altered including Jardiance, Entresto, and at elevated dose of Toprol-XL. In addition, the patient has been wearing a LifeVest due to depressed LVEF.  Today, the patient reports he has had no recent chest pain, shortness of breath, palpitations, lower extremity edema, lightheadedness/dizziness or syncope.    Allergies:      No Known Allergies    MEDICATIONS:  Current Outpatient Medications   Medication Instructions    allopurinol (ZYLOPRIM) 100 mg, Oral, Daily    apixaban (ELIQUIS) 5 mg, Oral, 2 Times Daily    cetirizine (ZYRTEC) 10 mg, Oral, Daily    cilostazol (PLETAL) 100 MG tablet 2 Times Daily    empagliflozin (JARDIANCE) 10 mg, Oral, Daily    Magnesium Oxide -Mg Supplement 400 mg, Oral, Daily    metFORMIN (GLUCOPHAGE) 1,000 mg, Oral, 2 Times Daily With Meals    metoprolol succinate XL (TOPROL XL) 150 mg, Oral, 2 Times Daily    metoprolol tartrate (LOPRESSOR) 25 mg, Oral, As Needed    pantoprazole (PROTONIX) 40 mg, Oral, Daily    sacubitril-valsartan (Entresto) 49-51 MG tablet 1 tablet, Oral, 2 Times Daily    sildenafil (VIAGRA) 100 mg, Oral, Daily PRN    tamsulosin (FLOMAX) 0.4 MG capsule 24 hr capsule 1 capsule, Oral, Daily       Past medical & surgical history, social and family history reviewed in the electronic medical record.    ROS: Pertinent positives listed in the HPI and problem list above. All others reviewed and negative.     Physical Exam:   Ht 185.4 cm (73\")   Wt 116 kg (256 lb 6.4 oz)   BMI 33.83 kg/m²     Constitutional:    Well-appearing 64 y.o. y/o adult  in no acute distress        Heart:  Irregularly irregular rhythm and " "normal rate, no murmurs, rubs or gallops   Lungs:     Clear to auscultation bilaterally, no wheezes, rhales or rhonchi, nonlabored respirations   Abdomen:     Soft, nontender   Extremities:   No gross deformities, no edema, clubbing, or cyanosis.    Pulses:    Neuro:  Psych:   Radial pulses palpable and equal bilaterally.  No gross focal deficits  Mood and behavior appropriate for situation     Labs and Diagnostic Data:  Lab Results   Component Value Date    GLUCOSE 122 (H) 06/28/2019    BUN 12 06/28/2019    CREATININE 0.90 06/28/2019     06/28/2019    K 4.5 06/28/2019     06/28/2019    CALCIUM 9.3 06/28/2019    BCR 13.3 06/28/2019    ANIONGAP 12.0 06/28/2019     No results found for: \"WBC\", \"HGB\", \"HCT\", \"MCV\", \"PLT\"  No results found for: \"HGBA1C\"  No results found for: \"TSH\"  No results found for: \"CHOL\", \"CHLPL\", \"TRIG\", \"HDL\", \"LDL\", \"LDLDIRECT\"        Tele: Atrial fibrillation    IMPRESSION:  Mr. Brennan is a 64-year-old male with a history of left atrial appendage thrombus and chronic heart failure with reduced LVEF nonischemic cardiomyopathy with persistently decreased LVEF <35% despite optimally titrated GDMT who presents for MING to reassess left atrial appendage and ICD implantation for primary prevention of sudden cardiac death.   Eliquis was taken this morning per instructions.  Metformin was held.  Shared decision making informational booklet confirmed received by the patient in the mail    PLAN:  Procedure to perform: MING and permanent ICD implantation. risks, benefits and alternatives to the procedure explained to the patient and he understands and wishes to proceed.     Electronically signed by Garth Durand PA-C, 10/07/24, 11:46 AM EDT.        "

## 2024-10-07 NOTE — DISCHARGE INSTRUCTIONS
DR. TEMPLE DEVICE IMPLANTATION    Dressing/Incision Care:   You have a dressing called a “Tegaderm” in place over your incision. Keep your dressing and   incision clean and dry. Do not remove this dressing. The dressing will be removed at your   follow up appointment.    Do not shower or submerge yourself in a bath, pool or hot tub until your doctor says it is   okay. You should take a sponge bath daily.     Activity:   Use your arms but do not raise the affected arm above the level of your shoulder. The   affected arm is the arm on the same side as your pacemaker device implant. For example, if   your pacemaker is on your left side, do not lift your left arm above shoulder level.    You may wear your sling at night as a reminder not to raise your affected arm above shoulder   level.    Do not lift, push or pull anything heavier than 10 lbs for at least 4 weeks. A gallon of milk   weighs about 8 lbs.    Do not do any activities that involve swinging your arm such as golf, swimming, tennis, or   chopping wood for at least 12 weeks or until your doctor says it is okay.   Do not do any activities that could bump or jar your pacemaker such as shoveling, mowing,   weed eating or firing a rifle until your doctor says it is okay.    Discuss your exercise plans with your doctor before exercising.    You may drive in 72 hours unless you have had a syncopal (passing out) episode in the last   90 days.     Remote Monitoring:   Pacemaker information is transmitted from a home monitor to your doctor’s office for   review. This is called “remote monitoring.” If you were not discharged with a remote   monitor and/or do not receive it in the mail when expected, please call the cardiology  office at [phone].     Follow Up Appointments:   In approximately 7-10 days you will have a follow up appointment with the cardiology   nurse or medical assistant to check your incision and device. You should receive your   follow up  appointment before you leave the hospital. If not, please call the cardiology   office at [phone].

## 2024-10-08 ENCOUNTER — CALL CENTER PROGRAMS (OUTPATIENT)
Dept: CALL CENTER | Facility: HOSPITAL | Age: 64
End: 2024-10-08
Payer: MEDICARE

## 2024-10-08 NOTE — OUTREACH NOTE
PCI/Device Survey      Flowsheet Row Responses   Facility patient discharged from? [facility]   Procedure date 10/07/24   Procedure (if device, specify in description) Device   Device Description Lead LD DEFIB RELIANCE4/FRONT 1COIL ACT 64CM - [id number] - [id number] Implanted [id number]   ICD ICD VIGILANT EL VR 29.5CC D232 - [id number] - [id number] Implanted [id number]   Performing MD Dr. Adam Vergara   Attempt successful? Yes   Call start time 1124   Call end time 1128   Person spoke with today (if not patient) and relationship Patient   Has the patient had any of the following symptoms since discharge? --  [Denies any symptoms. He reports that he took two regular tylenol last night for soreness but nothing today.]   Is the patient taking prescribed medications: Apixaban  [Resume eliquis Wednesday am.]   Nursing intervention Reminded to continue to take prescribed medications   Medication comments Patient reports that he only took one tablet (instead of 1.5) of metoprolol this am because his blood pressure was low with systolic in the 90's. He will continue to check B/P. Advised to contact cardiology office if readings remain low for medication instruction.   Does the patient have any of the following symptoms related to the cath/surgical site? --  [Patient denies any issues with dressing site.]   Does the patient have an appointment scheduled with the cardiologist? Yes   Appointment comments Wound Check  Monday Oct 14, 2024 1:30 PM, Hospital Follow Up with Paramjit Welch  Wednesday Bob 15, 2025 1:30 PM (Arrive by 1:00 PM), Follow Up with Adam Vergara  Wednesday Apr 30, 2025 1:30 PM   If the patient is a current smoker, are they able to teach back resources for cessation? Not a smoker   Did the patient feel prepared to go home on the same day as the procedure? Yes   Is the patient satisfied with the same day discharge process? Yes   PCI/Device call completed Yes   Wrap up additional comments Patient reports that he is  doing well today            SAGE CASTILLO - Registered Nurse

## 2024-10-09 ENCOUNTER — TELEPHONE (OUTPATIENT)
Dept: CARDIOLOGY | Facility: CLINIC | Age: 64
End: 2024-10-09
Payer: MEDICARE

## 2024-10-10 NOTE — TELEPHONE ENCOUNTER
I called to f/u with the patient. He said that his pharmacy already filled his prescription for Eliquis this week and he has been taking it. He will let me know if he has any issues in the future with getting Eliquis filled.

## 2024-10-14 ENCOUNTER — OFFICE VISIT (OUTPATIENT)
Dept: CARDIOLOGY | Facility: CLINIC | Age: 64
End: 2024-10-14
Payer: MEDICARE

## 2024-10-14 DIAGNOSIS — I48.21 PERMANENT ATRIAL FIBRILLATION: Primary | ICD-10-CM

## 2024-10-14 NOTE — PROGRESS NOTES
10/14/2024    Tito Brennan, : 1960      Fever: No    Temperature if indicated:     Wound Location: Left Infraclavicular    Dressing Removed: Removed by MA/RN      Old Dressing Appearance:  Old, bloody drainage    Wound Appearance: Redness []                  Drainage []                  Culture obtained []        Color: N/A     Consistency:        Amount: none         Gloves used, wound cleansed with sterile 4x4 and peroxide [x]       MD notified []     MD orders:     Antibiotic started []      If checked, type     Other:       Appointment for follow-up scheduled for 3 months post procedure []    Future Appointments   Date Time Provider Department Center   1/15/2025  1:30 PM Paramjit Welch PA [facility]   2025  1:30 PM Adam Vergara MD [facility]           Zora Arredondo MA, 10/14/24      MD Signature:______________________________ Completed By/Date:

## 2024-11-05 RX ORDER — SACUBITRIL AND VALSARTAN 49; 51 MG/1; MG/1
1 TABLET, FILM COATED ORAL 2 TIMES DAILY
Qty: 180 TABLET | Refills: 2 | Status: SHIPPED | OUTPATIENT
Start: 2024-11-05

## 2024-11-07 PROCEDURE — 93296 REM INTERROG EVL PM/IDS: CPT | Performed by: INTERNAL MEDICINE

## 2024-11-07 PROCEDURE — 93295 DEV INTERROG REMOTE 1/2/MLT: CPT | Performed by: INTERNAL MEDICINE

## 2024-11-11 ENCOUNTER — TELEPHONE (OUTPATIENT)
Dept: CARDIOLOGY | Facility: CLINIC | Age: 64
End: 2024-11-11
Payer: MEDICARE

## 2024-11-11 NOTE — TELEPHONE ENCOUNTER
Patient states that he cannot tolerate Metoprolol succinate 150 mg PO BID. His SBP has gotten as low as 85. He has only been taking 100 mg once a day and has been able to tolerate that dose. He said that his BP has been ranging between 105-110/60's-70's with HR's in the 70's on that dose.    He just wants to make sure that this is ok with you?

## 2024-11-14 NOTE — TELEPHONE ENCOUNTER
I called to f/u with the patient. He states that his BP has been lower recently. On 11/11/24 it was 117/71, on 11/12/24 it was 86/60, on 11/13/24 it was 89/57 and 114/72.     This morning his BP was 77/60 before he took his medications. His HR has been staying in the 70's and 80's. He did take Entresto this morning but did not take Metoprolol. He states that he feels fine and is not currently having any issues. He is going to continue to monitor his BP.    He would like to know if his medications could be adjusted?

## 2024-11-15 NOTE — TELEPHONE ENCOUNTER
I called to f/u with the patient. He would like to know if the dose of Entresto should be lowered as well? Today his BP was 90/60 and his HR was 86 bpm.

## 2024-11-18 NOTE — TELEPHONE ENCOUNTER
I called to f/u with the patient. He states that his BP was 107/79 and his HR was 75 bpm this morning before he took his medications. He states that he has only been taking Entresto each morning for the past 3 days and his BP is still low.    He would like to know if he should continue to take Entresto daily each morning along with the decreased dose of Metoprolol XL 50 mg daily?

## 2025-01-06 NOTE — TELEPHONE ENCOUNTER
I tried to submit a PA on Eliquis 5 mg BID through Cover My Meds but his insurance will not cover this medication.            MAMIE # HARP (Key: [id number])  Rx #: [id number]  Need Help? Call us at [phone]  Outcome  Additional Information Required  This drug/product is not covered under the pharmacy benefit. Prior Authorization is not available.  Drug  Eliquis 5MG tablets  ePA cloud logo  Form  [employer] [place] Medicaid ePA Form 2017 NCPDP  Original Claim Info  70   Medication: levothyroxine passed protocol.   Last office visit date: 8/7/24  Hypothyroid     Currently on levothyroxine 125 mcg daily.  Labs stable. Recheck annually. Will check today to get back on the same schedule as other annual labs.  8/7/2024 [facility]      TSH (mcUnits/mL)   Date Value   05/16/2024 0.700        Next appointment scheduled?: No;  not due    Number of refills given: 1  Thyroid Hormones Refill Protocol - 12 Month Protocol [id number]/06/2025 07:25 AM   Protocol Details Seen by prescribing provider or same department within the last 12 months or has a future appt in 3 months - IF FAILED PLEASE LOOK AT CHART REVIEW FOR LAST VISIT AND PROCEED ACCORDINGLY    Normal TSH within last 12 months looking at last value - IF FAILED REFER TO PROTOCOL DETAILS    Medication (including dose and sig) on current meds list

## 2025-01-16 ENCOUNTER — OFFICE VISIT (OUTPATIENT)
Dept: CARDIOLOGY | Facility: CLINIC | Age: 65
End: 2025-01-16
Payer: MEDICARE

## 2025-01-16 VITALS
OXYGEN SATURATION: 98 % | HEART RATE: 85 BPM | SYSTOLIC BLOOD PRESSURE: 124 MMHG | WEIGHT: 251.8 LBS | BODY MASS INDEX: 33.37 KG/M2 | DIASTOLIC BLOOD PRESSURE: 68 MMHG | HEIGHT: 73 IN

## 2025-01-16 DIAGNOSIS — I42.8 NICM (NONISCHEMIC CARDIOMYOPATHY): ICD-10-CM

## 2025-01-16 DIAGNOSIS — I48.21 PERMANENT ATRIAL FIBRILLATION: ICD-10-CM

## 2025-01-16 DIAGNOSIS — I10 ESSENTIAL HYPERTENSION: Primary | ICD-10-CM

## 2025-01-16 RX ORDER — METOPROLOL TARTRATE 25 MG/1
25 TABLET, FILM COATED ORAL AS NEEDED
Qty: 60 TABLET | Refills: 11 | Status: SHIPPED | OUTPATIENT
Start: 2025-01-16

## 2025-01-16 NOTE — PROGRESS NOTES
[facility] at [facility]   OFFICE NOTE      Tito Brennan  1960  PCP: Gabe Mims MD    SUBJECTIVE:   Tito Brennan is a 64 y.o. male seen for a follow up visit regarding the following:     CC: Atrial fibrillation    HPI:   Pleasant 64-year-old gentleman presents today for follow-up regarding atrial fibrillation, nonischemic cardiomyopathy, coronary disease, recent single-chamber [employer] ICD.  The procedure went well he healed fine.  He did have some difficulty with marginal blood pressure on Entresto and higher dose Toprol.  This dose has been now adjusted he states his blood pressure is better has not noted any further hypotensive episodes.  He is not having any symptoms of palpitations dizziness ICD shocks.  He denies any worsening heart failure symptoms.  He is having difficult time with the colder weather is where he lives he has a hard time getting out of his house because of this now but he feels like medication wise health wise he is doing okay.  He is tolerating Eliquis well with no bleeding issues.    Cardiac PMH: (Old records have been reviewed and summarized below)  Persistent atrial fibrillation  CHADSVASc = 3, on warfarin  Diagnosed ~2000 at time of colonoscopy, asymptomatic  Admitted for Tikosyn initiation, 06/2019, failed ECV x 3, ERAF after ICV, Tikosyn discontinued  Left atrial thrombus  CT scan of the chest with possible left atrial appendage thrombus April 2024  MING 5/19/2024 thrombus noted left atrial appendage.  Discontinuation of warfarin initiation of Eliquis  Chronic Systolic heart FAilureNICM  Echocardiogram 12/28/2018: EF 40%, LA size 5.2 cm in atrial fibrillation during exam  Echo 10/10/2019 LVEF 40%, Mild MR  Echocardiogram 3/16/2022: EF 46-50%, RVSP < 35 mmHg  Echocardiogram 3/1/2024 LVEF 25 to 30% moderate to severe right ventricular enlargement, no significant valvular heart disease  MING 5/19/2024 LVEF 25 to 30%, thrombus noted in left atrial  appendage.  Coronary artery disease  Nuclear stress test 2/13/2019: Moderate size, moderate intensity anteroseptal wall reversible defect suggestive of ischemia, EF 37%  Left heart catheterization 3/6/2019: Nonobstructive, noncritical coronary artery disease to the distal RCA, 20-30%, EF 35-40%  Echo 10/10/2019 LVEF 40%, Mild MR  Diabetes mellitus type II  Hypertension  PAD - followed in [place], on Pletal   Hand surgery 2/2023     Past Medical History, Past Surgical History, Family history, Social History, and Medications were all reviewed with the patient today and updated as necessary.       Current Outpatient Medications:     allopurinol (ZYLOPRIM) 100 MG tablet, Take 1 tablet by mouth Daily., Disp: , Rfl:     apixaban (ELIQUIS) 5 MG tablet tablet, Take 1 tablet by mouth 2 (Two) Times a Day. First dose Wednesday AM, Disp: 60 tablet, Rfl: 6    cetirizine (zyrTEC) 10 MG tablet, Take 1 tablet by mouth Daily., Disp: , Rfl:     cilostazol (PLETAL) 100 MG tablet, 2 (Two) Times a Day., Disp: , Rfl:     empagliflozin (JARDIANCE) 10 MG tablet tablet, Take 1 tablet by mouth Daily., Disp: 90 tablet, Rfl: 2    Magnesium Oxide 400 (240 Mg) MG tablet, Take 1 tablet by mouth Daily., Disp: 30 tablet, Rfl: 11    metFORMIN (GLUCOPHAGE) 1000 MG tablet, Take 1 tablet by mouth 2 (Two) Times a Day With Meals., Disp: , Rfl:     metoprolol succinate XL (TOPROL-XL) 50 MG 24 hr tablet, Take 1 tablet by mouth Daily., Disp: , Rfl:     metoprolol tartrate (LOPRESSOR) 25 MG tablet, Take 1 tablet by mouth As Needed (palpitatons, hr >100)., Disp: 60 tablet, Rfl: 11    pantoprazole (PROTONIX) 40 MG EC tablet, Take 1 tablet by mouth Daily., Disp: 30 tablet, Rfl: 6    sacubitril-valsartan (Entresto) 49-51 MG tablet, Take 1 tablet by mouth Every Morning., Disp: , Rfl:     sildenafil (VIAGRA) 100 MG tablet, Take 1 tablet by mouth Daily As Needed for Erectile Dysfunction., Disp: , Rfl:     tamsulosin (FLOMAX) 0.4 MG capsule 24 hr capsule, Take 1  "capsule by mouth Daily., Disp: , Rfl:       No Known Allergies      PHYSICAL EXAM:    /68 (BP Location: Right arm, Patient Position: Sitting, Cuff Size: Adult)   Pulse 85   Ht 185.4 cm (73\")   Wt 114 kg (251 lb 12.8 oz)   SpO2 98%   BMI 33.22 kg/m²        Wt Readings from Last 5 Encounters:   01/16/25 114 kg (251 lb 12.8 oz)   10/07/24 116 kg (255 lb 11.7 oz)   06/12/24 120 kg (263 lb 9.6 oz)   05/17/24 124 kg (273 lb 5.9 oz)   04/17/24 124 kg (274 lb)       BP Readings from Last 5 Encounters:   01/16/25 124/68   10/07/24 107/69   06/12/24 127/72   05/17/24 119/74   04/17/24 122/72       General appearance - Alert, well appearing, and in no distress   Mental status - Affect appropriate to mood.  Eyes - Sclerae anicteric,  ENMT - Hearing grossly normal bilaterally, Dental hygiene good.  Neck - Carotids upstroke normal bilaterally, no bruits, no JVD.  Resp - Clear to auscultation, no wheezes, rales or rhonchi, symmetric air entry.  Heart -IRIR, normal S1, S2, no murmurs, rubs, clicks or gallops.  GI - Soft, nontender, nondistended, no masses or organomegaly.  Neurological - Grossly intact - normal speech, no focal findings  Musculoskeletal - No joint tenderness, deformity or swelling, no muscular tenderness noted.  Extremities - Peripheral pulses normal, no pedal edema, no clubbing or cyanosis.  Skin - Normal coloration and turgor.  Psych -  oriented to person, place, and time.    Medical problems and test results were reviewed with the patient today.     No results found for this or any previous visit (from the past 4 weeks).        Device Interrogation:  Please see device interrogation form which has been signed and updated for full details.  [employer] ICD VVI at 40 RV paced less than 1% normal R wave thresholds impedances.  Battery voltage 14.5 years.  High heart rate episodes.    ASSESSMENT   1.  Persistent A-fib: Long-term persistent, permanent rate control Toprol therapy.  He is asymptomatic.  " Remains on Eliquis therapy with no bleeding issues      2.  Nonischemic cardiomyopathy: Continue Saad directed medical therapy including Toprol, Entresto and Jardiance.    3.  Previous left atrial appendage thrombus: Transesophageal echocardiogram October 7, 2024 reveals atrial thrombus is resolved.  EF 30%    PLAN  Follow-up in 8 months Dr. Vergara            1/16/2025  14:18 EST  Electronically signed by KELIN Gamino, 01/16/25, 3:09 PM EST.

## 2025-02-05 ENCOUNTER — TELEPHONE (OUTPATIENT)
Dept: CARDIOLOGY | Facility: CLINIC | Age: 65
End: 2025-02-05
Payer: MEDICARE

## 2025-02-05 NOTE — TELEPHONE ENCOUNTER
MAMIE # HARP (Key: [id number])  Rx #: [id number]    Outcome  This drug/product is not covered under the pharmacy benefit. Prior Authorization is not available    Drug  Jardiance 10MG tablets

## 2025-02-05 NOTE — TELEPHONE ENCOUNTER
Called and spoke with patient. He states he hasn't had any issues getting his jardiance at the pharmacy.

## 2025-02-13 ENCOUNTER — TELEPHONE (OUTPATIENT)
Dept: CARDIOLOGY | Facility: CLINIC | Age: 65
End: 2025-02-13
Payer: MEDICARE

## 2025-02-13 PROCEDURE — 93295 DEV INTERROG REMOTE 1/2/MLT: CPT | Performed by: INTERNAL MEDICINE

## 2025-02-13 PROCEDURE — 93296 REM INTERROG EVL PM/IDS: CPT | Performed by: INTERNAL MEDICINE

## 2025-02-13 NOTE — TELEPHONE ENCOUNTER
Remote transmission received today. Patient has a [facility] ICD programmed VVI @ 40. Date of Implant: 10/07/2024.     Patient had VT-1 episode detected on 02/12/2025 lasting 2 minutes and 29 seconds with:   ATP x6 delivered. Suggestive of AF with RVR. On Eliquis and Metoprolol (see med list for additional medication). Please see episode below.     Full report sent via kwiry.

## 2025-02-14 RX ORDER — METOPROLOL SUCCINATE 50 MG/1
50 TABLET, EXTENDED RELEASE ORAL 2 TIMES DAILY
Start: 2025-02-14

## 2025-07-03 ENCOUNTER — TELEPHONE (OUTPATIENT)
Dept: CARDIOLOGY | Facility: CLINIC | Age: 65
End: 2025-07-03
Payer: MEDICARE

## 2025-07-03 NOTE — TELEPHONE ENCOUNTER
Dr. Waldo Drake is requesting cardiac and EP clearance for the patient to have a left inguinal hernia repair in August. He would like to know how long he needs to hold Eliquis prior to and would like recommendations for his ICD during surgery?          ((P)[phone]  (F)[phone]  ATTN: Dr. Waldo Drake

## 2025-08-11 ENCOUNTER — TELEPHONE (OUTPATIENT)
Dept: CARDIOLOGY | Facility: CLINIC | Age: 65
End: 2025-08-11
Payer: MEDICARE

## 2025-08-14 LAB
MC_CV_MDC_IDC_RATE_1: 150
MC_CV_MDC_IDC_RATE_1: 190
MC_CV_MDC_IDC_RATE_1: 220
MC_CV_MDC_IDC_SHOCK_MEASURED_IMPEDANCE: 77
MC_CV_MDC_IDC_THERAPIES: NORMAL
MC_CV_MDC_IDC_THERAPIES: NORMAL
MC_CV_MDC_IDC_ZONE_ID: 1
MC_CV_MDC_IDC_ZONE_ID: 2
MC_CV_MDC_IDC_ZONE_ID: 3
MDC_IDC_MSMT_BATTERY_REMAINING_LONGEVITY: 162 MO
MDC_IDC_MSMT_BATTERY_REMAINING_PERCENTAGE: 100 %
MDC_IDC_MSMT_BATTERY_STATUS: NORMAL
MDC_IDC_MSMT_CAP_CHARGE_TIME: 10.4
MDC_IDC_MSMT_LEADCHNL_RV_DTM: NORMAL
MDC_IDC_MSMT_LEADCHNL_RV_IMPEDANCE_VALUE: 499
MDC_IDC_MSMT_LEADCHNL_RV_PACING_THRESHOLD_POLARITY: NORMAL
MDC_IDC_MSMT_LEADCHNL_RV_SENSING_INTR_AMPL: 25
MDC_IDC_PG_IMPLANT_DTM: NORMAL
MDC_IDC_PG_MFG: NORMAL
MDC_IDC_PG_MODEL: NORMAL
MDC_IDC_PG_SERIAL: NORMAL
MDC_IDC_PG_TYPE: NORMAL
MDC_IDC_SESS_DTM: NORMAL
MDC_IDC_SESS_TYPE: NORMAL
MDC_IDC_SET_BRADY_LOWRATE: 40
MDC_IDC_SET_BRADY_MODE: NORMAL
MDC_IDC_SET_LEADCHNL_RV_PACING_AMPLITUDE: 3.5
MDC_IDC_SET_LEADCHNL_RV_PACING_POLARITY: NORMAL
MDC_IDC_SET_LEADCHNL_RV_PACING_PULSEWIDTH: 0.4
MDC_IDC_SET_LEADCHNL_RV_SENSING_POLARITY: NORMAL
MDC_IDC_SET_LEADCHNL_RV_SENSING_SENSITIVITY: 0.6
MDC_IDC_SET_ZONE_STATUS: NORMAL
MDC_IDC_SET_ZONE_TYPE: NORMAL
MDC_IDC_STAT_BRADY_RV_PERCENT_PACED: 0
MDC_IDC_STAT_TACHYTHERAPY_ATP_DELIVERED_RECENT: 3
MDC_IDC_STAT_TACHYTHERAPY_SHOCKS_ABORTED_RECENT: 0
MDC_IDC_STAT_TACHYTHERAPY_SHOCKS_DELIVERED_RECENT: 0

## (undated) DEVICE — ADULT NASAL CO2 SAMPLING WITH O2 DELIVERY CANNULA FOR CAPNOFLEX MODULE: Brand: VITAL SIGNS™

## (undated) DEVICE — TRAP FLD MINIVAC MEGADYNE 100ML

## (undated) DEVICE — INTRO SHEATH ENGAGE W/50 GW .038 8F12

## (undated) DEVICE — MEDI-VAC YANKAUER SUCTION HANDLE W/BULBOUS TIP: Brand: CARDINAL HEALTH

## (undated) DEVICE — LEX ELECTRO PHYSIOLOGY: Brand: MEDLINE INDUSTRIES, INC.

## (undated) DEVICE — PENCL SMOKE/EVAC MEGADYNE TELESCP 10FT

## (undated) DEVICE — ELECTRD RETRN/GRND MEGADYNE SGL/PLT W/CORD 9FT DISP

## (undated) DEVICE — CANN NASL CO2 DIVIDED A/

## (undated) DEVICE — SET PRIMARY GRVTY 10DP MALE LL 104IN

## (undated) DEVICE — DECANT BG O JET

## (undated) DEVICE — ST INF PRI SMRTSTE 20DRP 2VLV 24ML 117

## (undated) DEVICE — TUBING, SUCTION, 1/4" X 10', STRAIGHT: Brand: MEDLINE

## (undated) DEVICE — LIMB HOLDER, WRIST/ANKLE: Brand: DEROYAL

## (undated) DEVICE — IRRIGATOR BULB ASEPTO 60CC STRL

## (undated) DEVICE — SOL NACL 0.9PCT 1000ML

## (undated) DEVICE — ST EXT IV SMARTSITE 2VLV SP M LL 5ML IV1

## (undated) DEVICE — DRSNG SURESITE123 4X4.8IN

## (undated) DEVICE — ADULT, W/LG. BACK PAD, RADIOTRANSPARENT ELEMENT AND LEAD WIRE COMPATIBLE W/: Brand: DEFIBRILLATION ELECTRODES

## (undated) DEVICE — INTRO TEAR AWAY/LVD W/SD PRT 8F 13CM

## (undated) DEVICE — EP RECORDING CATHETER 14 POLE, FIXED CURVE: Brand: EP RECORDING CATHETER

## (undated) DEVICE — CAUTERY TIP POLISHER: Brand: DEVON

## (undated) DEVICE — 3M™ STERI-STRIP™ REINFORCED ADHESIVE SKIN CLOSURES, R1547, 1/2 IN X 4 IN (12 MM X 100 MM), 6 STRIPS/ENVELOPE: Brand: 3M™ STERI-STRIP™